# Patient Record
Sex: MALE | Race: WHITE | Employment: OTHER | URBAN - METROPOLITAN AREA
[De-identification: names, ages, dates, MRNs, and addresses within clinical notes are randomized per-mention and may not be internally consistent; named-entity substitution may affect disease eponyms.]

---

## 2020-01-01 ENCOUNTER — APPOINTMENT (OUTPATIENT)
Dept: ULTRASOUND IMAGING | Age: 85
End: 2020-01-01
Payer: COMMERCIAL

## 2020-01-01 ENCOUNTER — HOSPITAL ENCOUNTER (EMERGENCY)
Age: 85
Discharge: HOME | End: 2020-01-01
Attending: EMERGENCY MEDICINE
Payer: COMMERCIAL

## 2020-01-01 VITALS
DIASTOLIC BLOOD PRESSURE: 59 MMHG | OXYGEN SATURATION: 94 % | HEART RATE: 61 BPM | HEIGHT: 69 IN | TEMPERATURE: 97.9 F | SYSTOLIC BLOOD PRESSURE: 169 MMHG | BODY MASS INDEX: 27.4 KG/M2 | RESPIRATION RATE: 18 BRPM | WEIGHT: 185 LBS

## 2020-01-01 LAB
ALBUMIN SERPL-MCNC: 3.6 GM/DL (ref 3.4–5)
ALP BLD-CCNC: 85 IU/L (ref 40–129)
ALT SERPL-CCNC: 13 U/L (ref 10–40)
ANION GAP SERPL CALCULATED.3IONS-SCNC: 16 MMOL/L (ref 4–16)
AST SERPL-CCNC: 27 IU/L (ref 15–37)
BASOPHILS ABSOLUTE: 0.1 K/CU MM
BASOPHILS RELATIVE PERCENT: 1.1 % (ref 0–1)
BILIRUB SERPL-MCNC: 0.6 MG/DL (ref 0–1)
BUN BLDV-MCNC: 27 MG/DL (ref 6–23)
CALCIUM SERPL-MCNC: 8.9 MG/DL (ref 8.3–10.6)
CHLORIDE BLD-SCNC: 98 MMOL/L (ref 99–110)
CO2: 27 MMOL/L (ref 21–32)
CREAT SERPL-MCNC: 1.7 MG/DL (ref 0.9–1.3)
DIFFERENTIAL TYPE: ABNORMAL
EOSINOPHILS ABSOLUTE: 0.4 K/CU MM
EOSINOPHILS RELATIVE PERCENT: 7.5 % (ref 0–3)
GFR AFRICAN AMERICAN: 46 ML/MIN/1.73M2
GFR NON-AFRICAN AMERICAN: 38 ML/MIN/1.73M2
GLUCOSE BLD-MCNC: 128 MG/DL (ref 70–99)
HCT VFR BLD CALC: 38.6 % (ref 42–52)
HEMOGLOBIN: 11.8 GM/DL (ref 13.5–18)
IMMATURE NEUTROPHIL %: 0.7 % (ref 0–0.43)
LYMPHOCYTES ABSOLUTE: 1.3 K/CU MM
LYMPHOCYTES RELATIVE PERCENT: 24.2 % (ref 24–44)
MCH RBC QN AUTO: 28.8 PG (ref 27–31)
MCHC RBC AUTO-ENTMCNC: 30.6 % (ref 32–36)
MCV RBC AUTO: 94.1 FL (ref 78–100)
MONOCYTES ABSOLUTE: 0.5 K/CU MM
MONOCYTES RELATIVE PERCENT: 9 % (ref 0–4)
PDW BLD-RTO: 15.5 % (ref 11.7–14.9)
PLATELET # BLD: 186 K/CU MM (ref 140–440)
PMV BLD AUTO: 9.6 FL (ref 7.5–11.1)
POTASSIUM SERPL-SCNC: 4.5 MMOL/L (ref 3.5–5.1)
PRO-BNP: 490.5 PG/ML
RBC # BLD: 4.1 M/CU MM (ref 4.6–6.2)
SEGMENTED NEUTROPHILS ABSOLUTE COUNT: 3.1 K/CU MM
SEGMENTED NEUTROPHILS RELATIVE PERCENT: 57.5 % (ref 36–66)
SODIUM BLD-SCNC: 141 MMOL/L (ref 135–145)
TOTAL IMMATURE NEUTOROPHIL: 0.04 K/CU MM
TOTAL PROTEIN: 6.8 GM/DL (ref 6.4–8.2)
WBC # BLD: 5.5 K/CU MM (ref 4–10.5)

## 2020-01-01 PROCEDURE — 83880 ASSAY OF NATRIURETIC PEPTIDE: CPT

## 2020-01-01 PROCEDURE — 80053 COMPREHEN METABOLIC PANEL: CPT

## 2020-01-01 PROCEDURE — 99284 EMERGENCY DEPT VISIT MOD MDM: CPT

## 2020-01-01 PROCEDURE — 93971 EXTREMITY STUDY: CPT

## 2020-01-01 PROCEDURE — 85025 COMPLETE CBC W/AUTO DIFF WBC: CPT

## 2020-01-01 RX ORDER — CLINDAMYCIN HYDROCHLORIDE 300 MG/1
300 CAPSULE ORAL 2 TIMES DAILY
Qty: 14 CAPSULE | Refills: 0 | Status: SHIPPED | OUTPATIENT
Start: 2020-01-01 | End: 2020-01-08

## 2020-01-01 RX ORDER — NITROGLYCERIN 0.4 MG/1
0.4 TABLET SUBLINGUAL EVERY 5 MIN PRN
COMMUNITY

## 2020-01-01 RX ORDER — EZETIMIBE 10 MG/1
1 TABLET ORAL
COMMUNITY

## 2020-01-01 RX ORDER — MAGNESIUM 30 MG
250 TABLET ORAL DAILY
COMMUNITY
End: 2020-05-14

## 2020-01-01 RX ORDER — FUROSEMIDE 40 MG/1
1 TABLET ORAL
COMMUNITY

## 2020-01-01 RX ORDER — TAMSULOSIN HYDROCHLORIDE 0.4 MG/1
0.4 CAPSULE ORAL DAILY
COMMUNITY
End: 2022-02-21

## 2020-01-01 SDOH — HEALTH STABILITY: MENTAL HEALTH: HOW OFTEN DO YOU HAVE A DRINK CONTAINING ALCOHOL?: NEVER

## 2020-01-01 ASSESSMENT — PAIN DESCRIPTION - ORIENTATION: ORIENTATION: RIGHT;LEFT

## 2020-01-01 ASSESSMENT — PAIN DESCRIPTION - FREQUENCY: FREQUENCY: CONTINUOUS

## 2020-01-01 ASSESSMENT — PAIN DESCRIPTION - DESCRIPTORS: DESCRIPTORS: ACHING

## 2020-01-01 ASSESSMENT — PAIN DESCRIPTION - LOCATION: LOCATION: LEG

## 2020-01-01 ASSESSMENT — PAIN SCALES - GENERAL: PAINLEVEL_OUTOF10: 3

## 2020-01-01 ASSESSMENT — PAIN DESCRIPTION - PAIN TYPE: TYPE: ACUTE PAIN

## 2020-01-01 NOTE — ED PROVIDER NOTES
distress. HEAD: Normocephalic. Atraumatic. EYES: EOM's grossly intact. Sclera anicteric. ENT: Mucous membranes are moist. Tolerates saliva. No trismus. NECK: Supple. No meningismus. Trachea midline. HEART: RRR. Radial pulses 2+. LUNGS: Respirations unlabored. CTAB  ABDOMEN: Soft. Non-tender. No guarding or rebound. EXTREMITIES: right leg swollen, mildly red and warm. No calf tenderness. Pulses +, sensation intact. SKIN: Warm and dry. NEUROLOGICAL: No gross facial drooping. Moves all 4 extremities spontaneously. PSYCHIATRIC: Normal mood.     I have reviewed and interpreted all of the currently available lab results from this visit (if applicable):  Results for orders placed or performed during the hospital encounter of 01/01/20   CBC with Auto Diff   Result Value Ref Range    WBC 5.5 4.0 - 10.5 K/CU MM    RBC 4.10 (L) 4.6 - 6.2 M/CU MM    Hemoglobin 11.8 (L) 13.5 - 18.0 GM/DL    Hematocrit 38.6 (L) 42 - 52 %    MCV 94.1 78 - 100 FL    MCH 28.8 27 - 31 PG    MCHC 30.6 (L) 32.0 - 36.0 %    RDW 15.5 (H) 11.7 - 14.9 %    Platelets 556 445 - 177 K/CU MM    MPV 9.6 7.5 - 11.1 FL    Differential Type AUTOMATED DIFFERENTIAL     Segs Relative 57.5 36 - 66 %    Lymphocytes % 24.2 24 - 44 %    Monocytes % 9.0 (H) 0 - 4 %    Eosinophils % 7.5 (H) 0 - 3 %    Basophils % 1.1 (H) 0 - 1 %    Segs Absolute 3.1 K/CU MM    Lymphocytes Absolute 1.3 K/CU MM    Monocytes Absolute 0.5 K/CU MM    Eosinophils Absolute 0.4 K/CU MM    Basophils Absolute 0.1 K/CU MM    Immature Neutrophil % 0.7 (H) 0 - 0.43 %    Total Immature Neutrophil 0.04 K/CU MM   CMP   Result Value Ref Range    Sodium 141 135 - 145 MMOL/L    Potassium 4.5 3.5 - 5.1 MMOL/L    Chloride 98 (L) 99 - 110 mMol/L    CO2 27 21 - 32 MMOL/L    BUN 27 (H) 6 - 23 MG/DL    CREATININE 1.7 (H) 0.9 - 1.3 MG/DL    Glucose 128 (H) 70 - 99 MG/DL    Calcium 8.9 8.3 - 10.6 MG/DL    Alb 3.6 3.4 - 5.0 GM/DL    Total Protein 6.8 6.4 - 8.2 GM/DL    Total Bilirubin 0.6 0.0 - 1.0 MG/DL ALT 13 10 - 40 U/L    AST 27 15 - 37 IU/L    Alkaline Phosphatase 85 40 - 129 IU/L    GFR Non- 38 (L) >60 mL/min/1.73m2    GFR  46 (L) >60 mL/min/1.73m2    Anion Gap 16 4 - 16   Brain Natriuretic Peptide   Result Value Ref Range    Pro-.5 (H) <300 PG/ML        Radiographs:  [] Radiologist's Wet Read Report Reviewed:      VL DUP LOWER EXTREMITY VENOUS RIGHT (Final result)   Result time 01/01/20 11:52:09   Final result by Henrry Samayoa MD (01/01/20 11:52:09)                Impression:    No evidence of DVT in the right lower extremity. Subcutaneous edema noted to the right calf.  No focal fluid collection noted. Narrative:    EXAMINATION:  DUPLEX VENOUS ULTRASOUND OF THE RIGHT LOWER EXTREMITY, 1/1/2020 11:23 am    TECHNIQUE:  Duplex ultrasound and Doppler images were obtained of the right lower  extremity. COMPARISON:  None. HISTORY:  ORDERING SYSTEM PROVIDED HISTORY: swelling, pain  TECHNOLOGIST PROVIDED HISTORY:  Reason for exam:->swelling, pain  Reason for Exam: swelling and redness to lower leg  Acuity: Acute  Type of Exam: Initial    FINDINGS:  The visualized veins of the right lower extremity are patent and free of  echogenic thrombus. The veins are normally compressible and have normal  phasic flow. Subcutaneous edema noted to the right calf.  No focal fluid collection noted. [] Discussed with Radiologist:     [] The following radiograph was interpreted by myself in the absence of a radiologist:     EKG: (All EKG's are interpreted by myself in the absence of a cardiologist)      MDM:  Patient's vital signs are stable. Will check CBC, CMP, BNP and get a Doppler of his right lower extremity. Patient CBC shows a normal white count of 5.5. Hemoglobin is 11.8. No left shift. Electrolytes reveal a glucose of 128, BUN of 27, creatinine of 1.7. Normal anion gap of 16. BNP normal. US negative for DVT. No fluid collections noted.

## 2020-01-20 ENCOUNTER — OFFICE VISIT (OUTPATIENT)
Dept: CARDIOLOGY CLINIC | Age: 85
End: 2020-01-20
Payer: MEDICARE

## 2020-01-20 VITALS
DIASTOLIC BLOOD PRESSURE: 60 MMHG | BODY MASS INDEX: 27.78 KG/M2 | WEIGHT: 187.6 LBS | HEIGHT: 69 IN | SYSTOLIC BLOOD PRESSURE: 126 MMHG | HEART RATE: 53 BPM

## 2020-01-20 PROCEDURE — G8427 DOCREV CUR MEDS BY ELIG CLIN: HCPCS | Performed by: INTERNAL MEDICINE

## 2020-01-20 PROCEDURE — 93000 ELECTROCARDIOGRAM COMPLETE: CPT | Performed by: INTERNAL MEDICINE

## 2020-01-20 PROCEDURE — G8417 CALC BMI ABV UP PARAM F/U: HCPCS | Performed by: INTERNAL MEDICINE

## 2020-01-20 PROCEDURE — G8484 FLU IMMUNIZE NO ADMIN: HCPCS | Performed by: INTERNAL MEDICINE

## 2020-01-20 PROCEDURE — 99204 OFFICE O/P NEW MOD 45 MIN: CPT | Performed by: INTERNAL MEDICINE

## 2020-01-20 RX ORDER — NITROGLYCERIN 0.4 MG/1
0.4 TABLET SUBLINGUAL EVERY 5 MIN PRN
COMMUNITY
End: 2020-05-14

## 2020-01-20 RX ORDER — DOCUSATE SODIUM 100 MG/1
100 CAPSULE, LIQUID FILLED ORAL 2 TIMES DAILY PRN
COMMUNITY

## 2020-01-20 RX ORDER — EZETIMIBE 10 MG/1
10 TABLET ORAL DAILY
COMMUNITY
End: 2020-05-14

## 2020-01-20 RX ORDER — LOVASTATIN 20 MG/1
20 TABLET ORAL NIGHTLY
COMMUNITY
End: 2020-01-20

## 2020-01-20 RX ORDER — PHENOL 1.4 %
1 AEROSOL, SPRAY (ML) MUCOUS MEMBRANE DAILY
COMMUNITY
End: 2020-01-20

## 2020-01-20 RX ORDER — TAMSULOSIN HYDROCHLORIDE 0.4 MG/1
0.4 CAPSULE ORAL DAILY
COMMUNITY
End: 2020-05-14

## 2020-01-20 RX ORDER — POTASSIUM CHLORIDE 750 MG/1
10 CAPSULE, EXTENDED RELEASE ORAL 2 TIMES DAILY
COMMUNITY
End: 2020-01-20

## 2020-01-20 RX ORDER — OMEGA-3 FATTY ACIDS CAP DELAYED RELEASE 1000 MG 1000 MG
3000 CAPSULE DELAYED RELEASE ORAL
COMMUNITY

## 2020-01-20 RX ORDER — LORAZEPAM 0.5 MG/1
0.5 TABLET ORAL EVERY 6 HOURS PRN
COMMUNITY
End: 2020-01-20

## 2020-01-20 RX ORDER — FUROSEMIDE 40 MG/1
40 TABLET ORAL 2 TIMES DAILY
COMMUNITY
End: 2020-01-20

## 2020-01-20 RX ORDER — METOPROLOL SUCCINATE 25 MG/1
25 TABLET, EXTENDED RELEASE ORAL DAILY
COMMUNITY
End: 2020-05-14

## 2020-01-20 RX ORDER — WARFARIN SODIUM 3 MG/1
TABLET ORAL DAILY
COMMUNITY
End: 2020-01-20

## 2020-01-20 RX ORDER — DICYCLOMINE HYDROCHLORIDE 10 MG/1
10 CAPSULE ORAL
COMMUNITY
End: 2020-01-20

## 2020-01-20 RX ORDER — FUROSEMIDE 40 MG/1
40 TABLET ORAL 2 TIMES DAILY
COMMUNITY
End: 2020-05-14

## 2020-01-20 RX ORDER — LOSARTAN POTASSIUM 50 MG/1
50 TABLET ORAL DAILY
COMMUNITY
End: 2020-01-20

## 2020-01-20 SDOH — HEALTH STABILITY: MENTAL HEALTH: HOW OFTEN DO YOU HAVE A DRINK CONTAINING ALCOHOL?: NEVER

## 2020-01-20 NOTE — PROGRESS NOTES
every 5 minutes as needed for Chest pain up to max of 3 total doses. If no relief after 1 dose, call 911.  tamsulosin (FLOMAX) 0.4 MG capsule Take 0.4 mg by mouth daily      VITAMIN D PO Take by mouth       No current facility-administered medications for this visit. Review of Systems:   · Constitutional: No Fever or Weight Loss   · Eyes: No Decreased Vision  · ENT: No Headaches, Hearing Loss or Vertigo  · Cardiovascular: No chest pain, dyspnea on exertion, palpitations or loss of consciousness  · Respiratory: No cough or wheezing    · Gastrointestinal: No abdominal pain, appetite loss, blood in stools, constipation, diarrhea or heartburn  · Genitourinary: No dysuria, trouble voiding, or hematuria  · Musculoskeletal:  No gait disturbance, weakness or joint complaints  · Integumentary: No rash or pruritis  · Neurological: No TIA or stroke symptoms  · Psychiatric: No anxiety or depression  · Endocrine: No malaise, fatigue or temperature intolerance  · Hematologic/Lymphatic: No bleeding problems, blood clots or swollen lymph nodes  · Allergic/Immunologic: No nasal congestion or hives  All systems negative except as marked. ·   ·      Physical Examination:    Vitals:    01/20/20 1015   BP: 126/60   Pulse: 53    rr 14  Afebrile    Wt Readings from Last 3 Encounters:   01/20/20 187 lb 9.6 oz (85.1 kg)     Body mass index is 27.7 kg/m². General Appearance:  No distress, conversant    Constitutional:  Well developed, Well nourished, No acute distress, Non-toxic appearance. HENT:  Normocephalic, Atraumatic, Bilateral external ears normal, Oropharynx moist, No oral exudates, Nose normal. Neck- Normal range of motion, No tenderness, Supple, No stridor,no apical-carotid delay, + carotid bruit  Eyes:  PERRL, EOMI, Conjunctiva normal, No discharge. Respiratory:  Normal breath sounds, No respiratory distress, No wheezing, No chest tenderness. ,no use of accessory muscles, diaphragm movement is normal  Cardiovascular: (PMI) apex non displaced,no lifts no thrills, no s3,no s4, Normal heart rate, Normal rhythm, + murmurs, No rubs, No gallops. Carotid arteries pulse and amplitude are normal no bruit, no abdominal bruit noted ( normal abdominal aorta ausculation)  GI:  Bowel sounds normal, Soft, No tenderness, No masses, No pulsatile masses, no hepatosplenomegally, no bruits  : External genitalia appear normal, No masses or lesions. No discharge. No CVA tenderness. Musculoskeletal:  Trace to mild edema, No tenderness, No cyanosis, No clubbing. Good range of motion in all major joints. No tenderness to palpation or major deformities noted. Back- No tenderness. Integument:  Warm, Dry, No erythema, No rash. Skin: no rash, no ulcers  Lymphatic:  No lymphadenopathy noted. Neurologic:  Alert & oriented x 3, Normal motor function, Normal sensory function, No focal deficits noted. Psychiatric:  Affect normal, Judgment normal, Mood normal.   Lab Review   No results for input(s): WBC, HGB, HCT, PLT in the last 72 hours. No results for input(s): NA, K, CL, CO2, PHOS, BUN, CREATININE in the last 72 hours. Invalid input(s): CA  No results for input(s): AST, ALT, ALB, BILIDIR, BILITOT, ALKPHOS in the last 72 hours. No results for input(s): TROPONINI in the last 72 hours. No results found for: BNP  No results found for: INR, PROTIME      EKG:paced    Chest Xray:pending    ECHO:pending  Labs, echo, meds reviewed  Assessment: 80 y. o.year old with PMH of  has no past medical history on file. Recommendations:    1. CAD and shortness of breath: he is 80 yrs old, can walk 75 yards with mild shortness of breath and chest pain when its extremely cold weather, he is DNR, WILL only get echo,continue aspirin and zetia, discussed in detail about DNR and conservative therapy. 2. Mumur: echo ordered  3. Left carotid bruit with history of carotid endarterectomy: carotid doppler ordered  4.  Intermittent claudications: conservative therapy recommended  5. Leg swelling: agree with lasix PRN  6. Dyslipidemia: continue xetia  7. Spinal stenosis: on pain pump  8. Pacer: interrogate today  9. Health maintenance: exerise and diet  All labs, medications and tests reviewed, continue all other medications of all above medical condition listed as is.          Ramón Hay MD, 1/20/2020 10:27 AM

## 2020-01-21 ENCOUNTER — TELEPHONE (OUTPATIENT)
Dept: CARDIOLOGY CLINIC | Age: 85
End: 2020-01-21

## 2020-01-23 ENCOUNTER — PROCEDURE VISIT (OUTPATIENT)
Dept: CARDIOLOGY CLINIC | Age: 85
End: 2020-01-23

## 2020-01-23 VITALS — SYSTOLIC BLOOD PRESSURE: 148 MMHG | DIASTOLIC BLOOD PRESSURE: 78 MMHG | HEART RATE: 50 BPM

## 2020-01-23 NOTE — PROCEDURES
Nuria Model  80 y.o., male  3/8/1927    No primary care provider on file. Chief Complaint   Patient presents with    Procedure     Pacemaker check     Vitals:    01/23/20 1049   BP: (!) 148/78   Pulse: 50     Pacer analysis is reviewed and filed in the pacer chart. Analysis is consistent with normal dual-chamber Bretta Binder pacer function with stable leads and appropriate battery status for the age of the device. Remaining battery is 3 years. Pacer is  48% pacing in the atrium and 94% pacing in the ventricle. Recommend continued every three month pacer check and follow up office visit as scheduled.     Gama Rajput MD, 1/23/2020 11:51 AM

## 2020-01-30 ENCOUNTER — PROCEDURE VISIT (OUTPATIENT)
Dept: CARDIOLOGY CLINIC | Age: 85
End: 2020-01-30
Payer: MEDICARE

## 2020-01-30 PROCEDURE — 93306 TTE W/DOPPLER COMPLETE: CPT | Performed by: INTERNAL MEDICINE

## 2020-01-30 PROCEDURE — 93880 EXTRACRANIAL BILAT STUDY: CPT | Performed by: INTERNAL MEDICINE

## 2020-02-05 ENCOUNTER — TELEPHONE (OUTPATIENT)
Dept: CARDIOLOGY CLINIC | Age: 85
End: 2020-02-05

## 2020-02-06 ENCOUNTER — TELEPHONE (OUTPATIENT)
Dept: CARDIOLOGY CLINIC | Age: 85
End: 2020-02-06

## 2020-03-05 ENCOUNTER — TELEPHONE (OUTPATIENT)
Dept: CARDIOLOGY CLINIC | Age: 85
End: 2020-03-05

## 2020-03-05 NOTE — TELEPHONE ENCOUNTER
Patients latitude monitor is not communicating with his device so I called patient to check monitor and send a transmission to see if we can get monitor going again.

## 2020-05-14 PROBLEM — N18.30 CHRONIC KIDNEY DISEASE, STAGE III (MODERATE) (HCC): Status: ACTIVE | Noted: 2020-05-14

## 2020-05-14 PROBLEM — R60.1 GENERALIZED EDEMA: Status: ACTIVE | Noted: 2020-05-14

## 2020-05-14 PROBLEM — M54.50 CHRONIC BILATERAL LOW BACK PAIN WITHOUT SCIATICA: Status: ACTIVE | Noted: 2020-05-14

## 2020-05-14 PROBLEM — Z95.0 PACEMAKER: Status: ACTIVE | Noted: 2020-05-14

## 2020-05-14 PROBLEM — G89.29 CHRONIC BILATERAL LOW BACK PAIN WITHOUT SCIATICA: Status: ACTIVE | Noted: 2020-05-14

## 2020-05-28 ENCOUNTER — OFFICE VISIT (OUTPATIENT)
Dept: CARDIOLOGY CLINIC | Age: 85
End: 2020-05-28
Payer: MEDICARE

## 2020-05-28 VITALS
WEIGHT: 188 LBS | SYSTOLIC BLOOD PRESSURE: 128 MMHG | HEART RATE: 76 BPM | BODY MASS INDEX: 27.85 KG/M2 | DIASTOLIC BLOOD PRESSURE: 84 MMHG | HEIGHT: 69 IN

## 2020-05-28 PROCEDURE — 93280 PM DEVICE PROGR EVAL DUAL: CPT | Performed by: INTERNAL MEDICINE

## 2020-05-28 PROCEDURE — 99214 OFFICE O/P EST MOD 30 MIN: CPT | Performed by: INTERNAL MEDICINE

## 2020-05-28 NOTE — PROGRESS NOTES
Cooper Bobby MD        OFFICE  FOLLOWUP NOTE    Chief complaints: patient is here for management of CAD,PAD, PPM, DYSLPIDEMIA    Subjective: OCCASIONAL chest pain, no shortness of breath, no dizziness, no palpitations    HPI Mike Crowder is a 80 y. o.year old who  has a past medical history of Arthritis, CAD (coronary artery disease), H/O Doppler carotid ultrasound, H/O echocardiogram, Hyperlipidemia, and Spinal stenosis. and presents for management of CAD,PAD, PPM, DYSLPIDEMIA which are well controlled    HE GETS occasional chest pain which is like ache. SUBSTERNAL Not pressure. Left sided. Non radiated, 2/10. Intermittent, gets better with NTG  Current Outpatient Medications   Medication Sig Dispense Refill    acetaminophen (TYLENOL) 325 MG tablet Take 650 mg by mouth as needed for Pain      NONFORMULARY Morphine pain pump      docusate sodium (COLACE) 100 MG capsule Take 100 mg by mouth 2 times daily      Omega-3 Fatty Acids (FISH OIL) 1000 MG CPDR Take 3,000 mg by mouth      Simethicone 40 MG/0.6ML LIQD Take by mouth as needed       Multiple Vitamins-Minerals (MULTIVITAMIN ADULT PO) Take by mouth      ezetimibe (ZETIA) 10 MG tablet Take 1 tablet by mouth      furosemide (LASIX) 40 MG tablet Take 1 tablet by mouth      metoprolol tartrate (LOPRESSOR) 25 MG tablet Take 25 mg by mouth Half a tab daily.  nitroGLYCERIN (NITROSTAT) 0.4 MG SL tablet Place 0.4 mg under the tongue every 5 minutes as needed for Chest pain up to max of 3 total doses. If no relief after 1 dose, call 911.  tamsulosin (FLOMAX) 0.4 MG capsule Take 0.4 mg by mouth daily      aspirin 81 MG tablet Take 81 mg by mouth daily       No current facility-administered medications for this visit. Allergies: Adhesive tape;  Adhesive tape; Diatrizoate; Penicillins; Statins; and Sulfa antibiotics  Past Medical History:   Diagnosis Date    Arthritis     CAD (coronary artery disease)     H/O Doppler carotid pupils are reactive to light and accomodation, cornea intact, conjunctive normal color, ears are normal in exam,throat exam in normal, teeth, gum and palate are normal, oral mucosa is normal without any notation of pallor or cyanosis  Neck - Supple. No jugular venous distention noted. No carotid bruits, no apical -carotid delay  Respiratory:  Normal breath sounds, No respiratory distress, No wheezing, No chest tenderness. ,no use of accessory muscles, diaphragm movement is normal  Cardiovascular: (PMI) apex non displaced,no lifts no thrills, no s3,no s4, Normal heart rate, Normal rhythm, No murmurs, No rubs, No gallops. Carotid arteries pulse and amplitude are normal no bruit, no abdominal bruit noted ( normal abdominal aorta ausculation), femoral arteries pulse and amplitude are normal no bruit, pedal pulses are normal  Femoral pulses have normal amplitude, no bruits   Extremities - No cyanosis, clubbing, or significant edema, no varicose veins    Abdomen - No masses, tenderness, or organomegaly, no hepato-splenomegally, no bruits  Musculoskeletal - No significant edema, no kyphosis or scoliosis, no deformity in any extremity noted, muscle strength and tone are normal  Skin: no ulcer,no scar,no stasis dermatitis   Neurologic - alert oriented times 3,Cranial nerves II through XII are grossly intact. There were no gross focal neurologic abnormalities.  All sensory and motor nerves examined and were normal  Psychiatric: normal mood and affect    No results found for: CKTOTAL, CKMB, CKMBINDEX, TROPONINI  BNP:  No results found for: BNP  PT/INR:  No results found for: PTINR  No results found for: LABA1C  No results found for: CHOL, TRIG, HDL, LDLCALC, LDLDIRECT  Lab Results   Component Value Date    ALT 13 01/01/2020    AST 27 01/01/2020     TSH:  No results found for: TSH  PACER/ICD  INTERROGATION      LIFE/VOLTAGE: 2.5 YRS   A FIB:No  ATRIAL PACING:YES  VENTRICULAR PACING:YES  SHOCKS:No  ALL lead thresholds, impedence

## 2020-09-16 PROBLEM — I35.0 NONRHEUMATIC AORTIC VALVE STENOSIS: Status: ACTIVE | Noted: 2020-09-16

## 2020-09-16 PROBLEM — E87.6 HYPOKALEMIA: Status: ACTIVE | Noted: 2020-09-16

## 2021-03-26 ENCOUNTER — TELEPHONE (OUTPATIENT)
Dept: CARDIOLOGY CLINIC | Age: 86
End: 2021-03-26

## 2021-04-10 ENCOUNTER — APPOINTMENT (OUTPATIENT)
Dept: CT IMAGING | Age: 86
End: 2021-04-10
Payer: MEDICARE

## 2021-04-10 ENCOUNTER — HOSPITAL ENCOUNTER (EMERGENCY)
Age: 86
Discharge: HOME | End: 2021-04-10
Attending: EMERGENCY MEDICINE
Payer: MEDICARE

## 2021-04-10 VITALS
WEIGHT: 190 LBS | HEIGHT: 69 IN | DIASTOLIC BLOOD PRESSURE: 50 MMHG | RESPIRATION RATE: 14 BRPM | OXYGEN SATURATION: 95 % | SYSTOLIC BLOOD PRESSURE: 119 MMHG | BODY MASS INDEX: 28.14 KG/M2 | TEMPERATURE: 98.1 F | HEART RATE: 78 BPM

## 2021-04-10 DIAGNOSIS — R33.9 URINARY RETENTION: Primary | ICD-10-CM

## 2021-04-10 DIAGNOSIS — R79.89 ELEVATED LFTS: ICD-10-CM

## 2021-04-10 DIAGNOSIS — N17.9 AKI (ACUTE KIDNEY INJURY) (HCC): ICD-10-CM

## 2021-04-10 DIAGNOSIS — R11.2 NON-INTRACTABLE VOMITING WITH NAUSEA, UNSPECIFIED VOMITING TYPE: ICD-10-CM

## 2021-04-10 LAB
ALBUMIN SERPL-MCNC: 3.9 GM/DL (ref 3.4–5)
ALP BLD-CCNC: 134 IU/L (ref 40–129)
ALT SERPL-CCNC: 92 U/L (ref 10–40)
ANION GAP SERPL CALCULATED.3IONS-SCNC: 10 MMOL/L (ref 4–16)
AST SERPL-CCNC: 101 IU/L (ref 15–37)
BACTERIA: NEGATIVE /HPF
BASOPHILS ABSOLUTE: 0 K/CU MM
BASOPHILS RELATIVE PERCENT: 0.3 % (ref 0–1)
BILIRUB SERPL-MCNC: 1 MG/DL (ref 0–1)
BILIRUBIN URINE: NEGATIVE MG/DL
BLOOD, URINE: NEGATIVE
BUN BLDV-MCNC: 26 MG/DL (ref 6–23)
CALCIUM SERPL-MCNC: 8.5 MG/DL (ref 8.3–10.6)
CAST TYPE: ABNORMAL /HPF
CHLORIDE BLD-SCNC: 99 MMOL/L (ref 99–110)
CLARITY: CLEAR
CO2: 23 MMOL/L (ref 21–32)
COLOR: YELLOW
CREAT SERPL-MCNC: 2 MG/DL (ref 0.9–1.3)
CRYSTAL TYPE: NEGATIVE /HPF
DIFFERENTIAL TYPE: ABNORMAL
EOSINOPHILS ABSOLUTE: 0.3 K/CU MM
EOSINOPHILS RELATIVE PERCENT: 4.4 % (ref 0–3)
EPITHELIAL CELLS, UA: 6 /HPF
GFR AFRICAN AMERICAN: 38 ML/MIN/1.73M2
GFR NON-AFRICAN AMERICAN: 31 ML/MIN/1.73M2
GLUCOSE BLD-MCNC: 136 MG/DL (ref 70–99)
GLUCOSE, URINE: NEGATIVE MG/DL
HCT VFR BLD CALC: 35.8 % (ref 42–52)
HEMOGLOBIN: 11.3 GM/DL (ref 13.5–18)
IMMATURE NEUTROPHIL %: 0.8 % (ref 0–0.43)
KETONES, URINE: NEGATIVE MG/DL
LEUKOCYTE ESTERASE, URINE: NEGATIVE
LIPASE: 22 IU/L (ref 13–60)
LYMPHOCYTES ABSOLUTE: 0.3 K/CU MM
LYMPHOCYTES RELATIVE PERCENT: 5.2 % (ref 24–44)
MCH RBC QN AUTO: 29 PG (ref 27–31)
MCHC RBC AUTO-ENTMCNC: 31.6 % (ref 32–36)
MCV RBC AUTO: 92 FL (ref 78–100)
MONOCYTES ABSOLUTE: 0.4 K/CU MM
MONOCYTES RELATIVE PERCENT: 5.9 % (ref 0–4)
NITRITE URINE, QUANTITATIVE: NEGATIVE
PDW BLD-RTO: 15.9 % (ref 11.7–14.9)
PH, URINE: 5 (ref 5–8)
PLATELET # BLD: 119 K/CU MM (ref 140–440)
PMV BLD AUTO: 10 FL (ref 7.5–11.1)
POTASSIUM SERPL-SCNC: 4.4 MMOL/L (ref 3.5–5.1)
PROTEIN UA: 100 MG/DL
RBC # BLD: 3.89 M/CU MM (ref 4.6–6.2)
RBC URINE: 1 /HPF (ref 0–3)
SARS-COV-2, NAAT: NOT DETECTED
SEGMENTED NEUTROPHILS ABSOLUTE COUNT: 4.9 K/CU MM
SEGMENTED NEUTROPHILS RELATIVE PERCENT: 83.4 % (ref 36–66)
SODIUM BLD-SCNC: 132 MMOL/L (ref 135–145)
SOURCE: NORMAL
SPECIFIC GRAVITY UA: 1.02 (ref 1–1.03)
TOTAL IMMATURE NEUTOROPHIL: 0.05 K/CU MM
TOTAL PROTEIN: 6.8 GM/DL (ref 6.4–8.2)
UROBILINOGEN, URINE: 0.2 MG/DL (ref 0.2–1)
WBC # BLD: 5.9 K/CU MM (ref 4–10.5)
WBC UA: 2 /HPF (ref 0–2)

## 2021-04-10 PROCEDURE — 83690 ASSAY OF LIPASE: CPT

## 2021-04-10 PROCEDURE — 74176 CT ABD & PELVIS W/O CONTRAST: CPT

## 2021-04-10 PROCEDURE — 6360000002 HC RX W HCPCS: Performed by: EMERGENCY MEDICINE

## 2021-04-10 PROCEDURE — 85025 COMPLETE CBC W/AUTO DIFF WBC: CPT

## 2021-04-10 PROCEDURE — 80053 COMPREHEN METABOLIC PANEL: CPT

## 2021-04-10 PROCEDURE — 2580000003 HC RX 258: Performed by: EMERGENCY MEDICINE

## 2021-04-10 PROCEDURE — 81001 URINALYSIS AUTO W/SCOPE: CPT

## 2021-04-10 PROCEDURE — 96374 THER/PROPH/DIAG INJ IV PUSH: CPT

## 2021-04-10 PROCEDURE — 87635 SARS-COV-2 COVID-19 AMP PRB: CPT

## 2021-04-10 PROCEDURE — 99285 EMERGENCY DEPT VISIT HI MDM: CPT

## 2021-04-10 PROCEDURE — 51702 INSERT TEMP BLADDER CATH: CPT

## 2021-04-10 PROCEDURE — 6370000000 HC RX 637 (ALT 250 FOR IP): Performed by: EMERGENCY MEDICINE

## 2021-04-10 RX ORDER — ONDANSETRON 4 MG/1
4 TABLET, ORALLY DISINTEGRATING ORAL EVERY 8 HOURS PRN
Qty: 15 TABLET | Refills: 0 | Status: SHIPPED | OUTPATIENT
Start: 2021-04-10 | End: 2021-09-01

## 2021-04-10 RX ORDER — 0.9 % SODIUM CHLORIDE 0.9 %
500 INTRAVENOUS SOLUTION INTRAVENOUS ONCE
Status: COMPLETED | OUTPATIENT
Start: 2021-04-10 | End: 2021-04-10

## 2021-04-10 RX ORDER — ONDANSETRON 2 MG/ML
4 INJECTION INTRAMUSCULAR; INTRAVENOUS EVERY 6 HOURS PRN
Status: DISCONTINUED | OUTPATIENT
Start: 2021-04-10 | End: 2021-04-10 | Stop reason: HOSPADM

## 2021-04-10 RX ORDER — ACETAMINOPHEN 500 MG
1000 TABLET ORAL
Status: COMPLETED | OUTPATIENT
Start: 2021-04-10 | End: 2021-04-10

## 2021-04-10 RX ADMIN — ONDANSETRON 4 MG: 2 INJECTION INTRAMUSCULAR; INTRAVENOUS at 13:54

## 2021-04-10 RX ADMIN — SODIUM CHLORIDE 500 ML: 9 INJECTION, SOLUTION INTRAVENOUS at 13:54

## 2021-04-10 RX ADMIN — ACETAMINOPHEN 1000 MG: 500 TABLET ORAL at 13:54

## 2021-04-10 ASSESSMENT — PAIN SCALES - GENERAL
PAINLEVEL_OUTOF10: 3
PAINLEVEL_OUTOF10: 3

## 2021-04-10 NOTE — ED NOTES
Leg bag attached to howard catheter, tolerated well, secured with leg strap, instructed the patient on emptying the bag and included a urinal in order for ease of drainage, he expressed understanding of this procedure and did not have any questions at this time. Was informed and encouraged to call or return for any further problems or concerns.       Corbin Matos RN  04/10/21 8920

## 2021-04-10 NOTE — ED PROVIDER NOTES
Emergency Department Encounter    Patient: Livier Waller  MRN: [id number]  : 1926  Date of Evaluation: 4/10/2021  ED Provider:  Erinn Xiao    Triage Chief Complaint:   Fever and Emesis    Clinical Impression:  1. Urinary retention    2. Elevated LFTs    3. VIOLETTA (acute kidney injury) ([facility] [address]    4. Non-intractable vomiting with nausea, unspecified vomiting type      Disposition referral (if applicable):  Ravi Graves MD  [address]  [phone]    In 3 days      Terrence Archibald PA-C  [address]  [phone]    Call in 3 days      ED Provider Disposition Time  DISPOSITION Decision To Discharge 04/10/2021 03:29:30 PM       MDM:  Patient presents with generalized body aches, fever, nausea/vomiting as well as urinary symptoms as below. Labs noted for creatinine of 2.0 from recent baseline of 1.7 by record review. Patient also noted to have elevation in ALT, AST and alkaline phosphatase as below. Total bilirubin is normal.  Patient has had prior cholecystectomy. Lipase is not elevated. CT abdomen pelvis was obtained which showed no hepatic emergency. There is incidental finding of multiple scattered areas of low attenuation in the liver likely representing benign cysts or hemangioma. There was evidence of urinary bladder outlet obstruction. Eduardo catheter was placed. Patient has no leukocytosis. Hemoglobin 11.3. Most recent hemoglobin 12. COVID-19 test negative. UA without evidence of urinary tract infection. Patient states that he is feeling much improved with treatment given as below. Patient states that he does not want to come into the hospital and states that he will follow up with his primary care provider on Monday. Patient will be prescribed Zofran. Flomax not prescribed given patient allergy. Patient will increase p.o. intake. Patient will follow up with urology.   Patient discharged with indwelling Eduardo catheter and leg bag. Large bag for nighttime use. Medications ordered in the ED:  ED Medication Orders (From admission, onward)    Start Ordered     Status Ordering Provider    04/10/21 1345 04/10/21 1344  0.9 % sodium chloride bolus  ONCE      Last MAR action: Stopped - by Norman Crowell on 04/10/21 at [facility], [place]    04/10/21 1343 04/10/21 1344  acetaminophen (TYLENOL) tablet 1,000 mg  ONCE PRN      Last MAR action: Given - by Norman Crowell on 04/10/21 at [address] [address], [facility]    04/10/21 1343 04/10/21 1344  ondansetron (ZOFRAN) injection 4 mg  EVERY 6 HOURS PRN      Last MAR action: Given - by Norman Crowell on 04/10/21 at [address], [place]          Disposition medications (if applicable):  New Prescriptions    ONDANSETRON (ZOFRAN ODT) 4 MG DISINTEGRATING TABLET    Take 1 tablet by mouth every 8 hours as needed for Nausea         HPI:  Len Lopez is a 80 y.o. male that presents complaining of 3-day history of nausea, vomiting, fever. Patient reports 3 episodes of emesis today without any evidence of hematemesis. Patient denies any diarrhea, hematochezia or melena. Patient states that he has been constipated. Patient also reports dysuria. Patient initially denied any abdominal pain although patient did endorse tenderness to palpation in the abdomen. No cough, chest pain, shortness of breath. Patient does endorse recent sick contact with a daughter with similar symptoms.     ROS - see HPI, below listed is current ROS at time of my eval:  General:  + fevers  Eyes:  No eye discharge  ENT:  No ear discharge  Cardiovascular:  No palpitations  Respiratory:  No wheezing  Gastrointestinal:  No hematemesis  Musculoskeletal:  No muscle pain  Skin:  No purpura  Neurologic:  No headache  Psychiatric:  No homicidal ideation  Genitourinary:  No hematuria  Endocrine:  No unexpected weight gain    Physical Exam:  Triage VS:    ED Triage Vitals   Enc Vitals Group      BP 04/10/21 1324 (!) 169/61      Pulse 04/10/21 1320 90      Resp 04/10/21 1320 14      Temp 04/10/21 1320 98.9 °F (37.2 °C)      Temp Source 04/10/21 1320 Oral      SpO2 04/10/21 1324 93 %      Weight 04/10/21 1320 190 lb (86.2 kg)      Height 04/10/21 1320 5' 9\" (1.753 m)      Head Circumference --       Peak Flow --       Pain Score --       Pain Loc --       Pain Edu? --       Excl. in [address]? --          General appearance:  No acute distress. Skin:  Warm. Dry. Eye:  Extraocular movements intact. Ears, nose, mouth and throat: Patient wearing mask  Neck:  Trachea midline. Extremity:  No swelling. Normal ROM     Heart:  Regular rate and rhythm, normal S1 & S2, 3/6 systolic murmur. Perfusion:  intact  Respiratory:  Lungs clear to auscultation bilaterally. Respirations nonlabored. Abdominal:  Normal bowel sounds. Soft. Tenderness to palpation in the right upper and lower quadrants. No rebound or guarding. Non distended. Back:  No CVA tenderness to palpation     Neurological:  Alert and oriented times 3. No focal neuro deficits. Psychiatric:  Appropriate    Past Medical History:   Diagnosis Date    Arthritis     CAD (coronary artery disease)     H/O Doppler carotid ultrasound 01/30/2020    Mod -Severe disease Right ICA, Mild disease Left ICA.  H/O echocardiogram 01/30/2020    EF 55-60%, MOD AS, Mild: PHTN, MR, TR & AR. Aortic root 3.8 cm.     Hyperlipidemia     Spinal stenosis      Past Surgical History:   Procedure Laterality Date    BACK SURGERY      CAROTID ENDARTERECTOMY      CATARACT REMOVAL      CHOLECYSTECTOMY      CORONARY ANGIOPLASTY WITH STENT PLACEMENT      PACEMAKER PLACEMENT       Family History   Problem Relation Age of Onset    Cancer Sister     Diabetes Brother      Social History     Socioeconomic History    Marital status:      Spouse name: Not on file    Number of children: Not on file    Years of education: Not on file    Highest education level: Not on file   Occupational History    Not with wall thickening and slightly lobulated contour. Prominent   prostate gland. Underlying urinary tract infection is difficult to exclude. 2.  Asymmetric left renal atrophy may relate to chronic vesicoureteral reflux. 3.  No bowel obstruction. Mild colonic diverticulosis without acute   diverticulitis. Comment: Please note this report has been produced using speech recognition software and may contain errors related to that system including errors in grammar, punctuation, and spelling, as well as words and phrases that may be inappropriate. Efforts were made to edit the dictations.         Jason Milan MD  04/10/21 2548

## 2021-04-10 NOTE — ED TRIAGE NOTES
Pt arrives with complaints of generalized body aches, fever, nausea and vomiting, since yesterday or day before yesterday, he complains of some pain with urination and burning. He reports occasional fever and decreased appetite denies any diarrhea.  States that his daughter has been ill

## 2021-06-08 ENCOUNTER — OFFICE VISIT (OUTPATIENT)
Dept: CARDIOLOGY CLINIC | Age: 86
End: 2021-06-08
Payer: MEDICARE

## 2021-06-08 VITALS
DIASTOLIC BLOOD PRESSURE: 60 MMHG | OXYGEN SATURATION: 95 % | WEIGHT: 185 LBS | SYSTOLIC BLOOD PRESSURE: 120 MMHG | BODY MASS INDEX: 27.4 KG/M2 | HEART RATE: 66 BPM | HEIGHT: 69 IN

## 2021-06-08 DIAGNOSIS — R26.81 UNSTEADY GAIT: Primary | ICD-10-CM

## 2021-06-08 PROCEDURE — 99214 OFFICE O/P EST MOD 30 MIN: CPT | Performed by: INTERNAL MEDICINE

## 2021-06-08 NOTE — PROGRESS NOTES
Juliana Veras MD        OFFICE  FOLLOWUP NOTE    Chief complaints: patient is here for management of CAD,PAD, PPM, DYSLPIDEMIA    Subjective: patient feels better, no chest pain, no shortness of breath, no dizziness, no palpitations    RICHARD Quinn is a 80 y. o.year old who  has a past medical history of Arthritis, CAD (coronary artery disease), H/O Doppler carotid ultrasound, H/O echocardiogram, Hyperlipidemia, and Spinal stenosis. and presents for management of CAD,PAD, PPM, DYSLPIDEMIA which are well controlled    Patient has urilift procedure, had covid vaccine also  Current Outpatient Medications   Medication Sig Dispense Refill    ondansetron (ZOFRAN ODT) 4 MG disintegrating tablet Take 1 tablet by mouth every 8 hours as needed for Nausea 15 tablet 0    oxyCODONE-acetaminophen (PERCOCET) 5-325 MG per tablet Take 1 tablet by mouth 2 times daily.  tiZANidine (ZANAFLEX) 2 MG tablet Take 2 mg by mouth daily       POTASSIUM CHLORIDE PO Take 90 mg by mouth daily      bisacodyl (DULCOLAX) 5 MG EC tablet Take 5 mg by mouth daily as needed for Constipation      acetaminophen (TYLENOL) 325 MG tablet Take 650 mg by mouth as needed for Pain      docusate sodium (COLACE) 100 MG capsule Take 100 mg by mouth 2 times daily as needed       Omega-3 Fatty Acids (FISH OIL) 1000 MG CPDR Take 3,000 mg by mouth      Simethicone 40 MG/0.6ML LIQD Take by mouth as needed       Multiple Vitamins-Minerals (MULTIVITAMIN ADULT PO) Take by mouth      ezetimibe (ZETIA) 10 MG tablet Take 1 tablet by mouth      furosemide (LASIX) 40 MG tablet Take 1 tablet by mouth      metoprolol tartrate (LOPRESSOR) 25 MG tablet Take 25 mg by mouth Half a tab daily.  nitroGLYCERIN (NITROSTAT) 0.4 MG SL tablet Place 0.4 mg under the tongue every 5 minutes as needed for Chest pain up to max of 3 total doses. If no relief after 1 dose, call 911.       tamsulosin (FLOMAX) 0.4 MG capsule Take 0.4 mg by mouth daily      Position: Sitting, Cuff Size: Medium Adult)   Pulse 66   Ht 5' 9\" (1.753 m)   Wt 185 lb (83.9 kg)   SpO2 95%   BMI 27.32 kg/m²   Wt Readings from Last 3 Encounters:   06/08/21 185 lb (83.9 kg)   04/10/21 190 lb (86.2 kg)   01/20/21 188 lb 9.6 oz (85.5 kg)     Body mass index is 27.32 kg/m². GENERAL - Alert, oriented, pleasant, in no apparent distress,normal grooming  HEENT - pupils are intact, cornea intact, conjunctive normal color, ears are normal in exam,  Neck - Supple. No jugular venous distention noted. No carotid bruits, no apical -carotid delay  Respiratory:  Normal breath sounds, No respiratory distress, No wheezing, No chest tenderness. ,no use of accessory muscles, diaphragm movement is normal  Cardiovascular: (PMI) apex non displaced,no lifts no thrills, no s3,no s4, Normal heart rate, Normal rhythm, + murmurs, No rubs, No gallops. Carotid arteries pulse and amplitude are normal no bruit, no abdominal bruit noted ( normal abdominal aorta ausculation),   Extremities - No cyanosis, clubbing, or significant edema, no varicose veins    Abdomen - No masses, tenderness, or organomegaly, no hepato-splenomegally, no bruits  Musculoskeletal + significant edema, no kyphosis or scoliosis, no deformity in any extremity noted, muscle strength and tone are normal  Skin: no ulcer,no scar,no stasis dermatitis   Neurologic - alert oriented times 3,Cranial nerves II through XII are grossly intact. There were no gross focal neurologic abnormalities. Psychiatric: normal mood and affect    No results found for: CKTOTAL, CKMB, CKMBINDEX, TROPONINI  BNP:  No results found for: BNP  PT/INR:  No results found for: PTINR  No results found for: LABA1C  No results found for: CHOL, TRIG, HDL, LDLCALC, LDLDIRECT  Lab Results   Component Value Date    ALT 92 (H) 04/10/2021     (H) 04/10/2021     TSH:  No results found for: TSH    Impression:  Dima Chung is a 80 y. o.year old who  has a past medical history of Arthritis, CAD (coronary artery disease), H/O Doppler carotid ultrasound, H/O echocardiogram, Hyperlipidemia, and Spinal stenosis. and presents with     Plan:  1. CAD and shortness of breath: he has occasional chest pain and takes NTG, he is 89 yrs old,he can  Still walk 75 yards with mild shortness of breath and chest pain when its extremely cold weather, he is DNR,echo showed moderate AS on  echo,continue aspirin and zetia, discussed in detail about DNR and conservative therapy. 2. Mumur: echo showed moderate AS  3. S/p urolift sx  4. Patient requested walker prescription  5. Left carotid bruit with history of carotid endarterectomy: carotid doppler ordered  6. Intermittent claudications: conservative therapy recommended  7. Leg swelling: agree with lasix PRN  8. Dyslipidemia: continue xetia  9. Spinal stenosis: on pain pump  1. Pacer: interrogated today, has 2.5 yrs of batter lifeHealth maintenance: exerise and diet  All labs, medications and tests reviewed, continue all other medications of all above medical condition listed as is.

## 2021-08-27 ENCOUNTER — HOSPITAL ENCOUNTER (EMERGENCY)
Age: 86
Discharge: LEFT BEFORE DISCHARGE | End: 2021-08-27
Attending: EMERGENCY MEDICINE
Payer: MEDICARE

## 2021-08-27 ENCOUNTER — APPOINTMENT (OUTPATIENT)
Dept: GENERAL RADIOLOGY | Age: 86
End: 2021-08-27
Payer: MEDICARE

## 2021-08-27 VITALS
TEMPERATURE: 98.8 F | HEART RATE: 85 BPM | SYSTOLIC BLOOD PRESSURE: 143 MMHG | OXYGEN SATURATION: 94 % | WEIGHT: 185 LBS | BODY MASS INDEX: 27.4 KG/M2 | HEIGHT: 69 IN | DIASTOLIC BLOOD PRESSURE: 58 MMHG | RESPIRATION RATE: 29 BRPM

## 2021-08-27 DIAGNOSIS — R07.9 CHEST PAIN, UNSPECIFIED TYPE: Primary | ICD-10-CM

## 2021-08-27 DIAGNOSIS — R06.00 DYSPNEA, UNSPECIFIED TYPE: ICD-10-CM

## 2021-08-27 LAB
ALBUMIN SERPL-MCNC: 4.1 GM/DL (ref 3.4–5)
ALP BLD-CCNC: 78 IU/L (ref 40–129)
ALT SERPL-CCNC: 14 U/L (ref 10–40)
ANION GAP SERPL CALCULATED.3IONS-SCNC: 11 MMOL/L (ref 4–16)
AST SERPL-CCNC: 28 IU/L (ref 15–37)
BASOPHILS ABSOLUTE: 0 K/CU MM
BASOPHILS RELATIVE PERCENT: 0.3 % (ref 0–1)
BILIRUB SERPL-MCNC: 0.7 MG/DL (ref 0–1)
BUN BLDV-MCNC: 38 MG/DL (ref 6–23)
CALCIUM SERPL-MCNC: 8.7 MG/DL (ref 8.3–10.6)
CHLORIDE BLD-SCNC: 100 MMOL/L (ref 99–110)
CO2: 29 MMOL/L (ref 21–32)
CREAT SERPL-MCNC: 2.1 MG/DL (ref 0.9–1.3)
DIFFERENTIAL TYPE: ABNORMAL
EKG ATRIAL RATE: 79 BPM
EKG DIAGNOSIS: NORMAL
EKG P-R INTERVAL: 240 MS
EKG Q-T INTERVAL: 458 MS
EKG QRS DURATION: 208 MS
EKG QTC CALCULATION (BAZETT): 525 MS
EKG R AXIS: -75 DEGREES
EKG T AXIS: 100 DEGREES
EKG VENTRICULAR RATE: 79 BPM
EOSINOPHILS ABSOLUTE: 0.1 K/CU MM
EOSINOPHILS RELATIVE PERCENT: 2 % (ref 0–3)
GFR AFRICAN AMERICAN: 36 ML/MIN/1.73M2
GFR NON-AFRICAN AMERICAN: 29 ML/MIN/1.73M2
GLUCOSE BLD-MCNC: 117 MG/DL (ref 70–99)
HCT VFR BLD CALC: 40 % (ref 42–52)
HEMOGLOBIN: 12.2 GM/DL (ref 13.5–18)
IMMATURE NEUTROPHIL %: 0.5 % (ref 0–0.43)
LYMPHOCYTES ABSOLUTE: 0.4 K/CU MM
LYMPHOCYTES RELATIVE PERCENT: 5.5 % (ref 24–44)
MCH RBC QN AUTO: 28.7 PG (ref 27–31)
MCHC RBC AUTO-ENTMCNC: 30.5 % (ref 32–36)
MCV RBC AUTO: 94.1 FL (ref 78–100)
MONOCYTES ABSOLUTE: 0.4 K/CU MM
MONOCYTES RELATIVE PERCENT: 5.6 % (ref 0–4)
PDW BLD-RTO: 15.3 % (ref 11.7–14.9)
PLATELET # BLD: 177 K/CU MM (ref 140–440)
PMV BLD AUTO: 9.6 FL (ref 7.5–11.1)
POTASSIUM SERPL-SCNC: 4.4 MMOL/L (ref 3.5–5.1)
PRO-BNP: 763.3 PG/ML
RBC # BLD: 4.25 M/CU MM (ref 4.6–6.2)
SEGMENTED NEUTROPHILS ABSOLUTE COUNT: 5.7 K/CU MM
SEGMENTED NEUTROPHILS RELATIVE PERCENT: 86.1 % (ref 36–66)
SODIUM BLD-SCNC: 140 MMOL/L (ref 135–145)
TOTAL IMMATURE NEUTOROPHIL: 0.03 K/CU MM
TOTAL PROTEIN: 6.9 GM/DL (ref 6.4–8.2)
TROPONIN T: <0.01 NG/ML
WBC # BLD: 6.6 K/CU MM (ref 4–10.5)

## 2021-08-27 PROCEDURE — 83880 ASSAY OF NATRIURETIC PEPTIDE: CPT

## 2021-08-27 PROCEDURE — 93005 ELECTROCARDIOGRAM TRACING: CPT | Performed by: EMERGENCY MEDICINE

## 2021-08-27 PROCEDURE — 99283 EMERGENCY DEPT VISIT LOW MDM: CPT

## 2021-08-27 PROCEDURE — 93010 ELECTROCARDIOGRAM REPORT: CPT | Performed by: INTERNAL MEDICINE

## 2021-08-27 PROCEDURE — 6370000000 HC RX 637 (ALT 250 FOR IP): Performed by: EMERGENCY MEDICINE

## 2021-08-27 PROCEDURE — 85025 COMPLETE CBC W/AUTO DIFF WBC: CPT

## 2021-08-27 PROCEDURE — 84484 ASSAY OF TROPONIN QUANT: CPT

## 2021-08-27 PROCEDURE — 71045 X-RAY EXAM CHEST 1 VIEW: CPT

## 2021-08-27 PROCEDURE — 80053 COMPREHEN METABOLIC PANEL: CPT

## 2021-08-27 RX ORDER — ASPIRIN 81 MG/1
243 TABLET, CHEWABLE ORAL ONCE
Status: COMPLETED | OUTPATIENT
Start: 2021-08-27 | End: 2021-08-27

## 2021-08-27 RX ADMIN — ASPIRIN 243 MG: 81 TABLET, CHEWABLE ORAL at 14:08

## 2021-08-27 NOTE — ED TRIAGE NOTES
The patient presents to the ed with C/O SOB and general malaise and hand tremors. He is A&O x 4 and O2 Sat here is 97% on room air.

## 2021-08-27 NOTE — ED PROVIDER NOTES
Triage Chief Complaint:   Shortness of Breath    [place]:  Irasema Harris is a 80 y.o. male that presents with episodic chest pain and shortness of breath for the past week. Patient reports that he has had several episodes where he gets out of breath with walking primarily. Additionally, patient has had a few episodes of chest pain described as lower chest and occasionally radiating to bilateral shoulders. Patient reports that his last episode he thought \"might be the big one\". Patient took some nitro with improvement of his symptoms. Today patient called his primary care physician he was referred to his cardiologist which she has appointment with later today however he chose to come here for initial assessment which is reasonable. No fevers or chills. No coughing. No abdominal pains. Patient does report history of \"16 heart caths and 16 stents\". Patient also with a pacer. ROS:  General:  No fevers, no chills, no weakness  Eyes:  No recent vison changes, no discharge  ENT:  No sore throat, no nasal congestion, no hearing changes  Cardiovascular:  + chest pain, no palpitations  Respiratory:  + shortness of breath, no cough, no wheezing  Gastrointestinal:  No pain, no nausea, no vomiting, no diarrhea  Musculoskeletal:  No muscle pain, no joint pain  Skin:  No rash, no pruritis, no easy bruising  Neurologic:  No speech problems, no headache, no extremity numbness, no extremity tingling, no extremity weakness  Psychiatric:  No anxiety  Genitourinary:  No dysuria, no hematuria  Endocrine:  No unexpected weight gain, no unexpected weight loss  Extremities:  no edema, no pain    Past Medical History:   Diagnosis Date    Arthritis     CAD (coronary artery disease)     H/O Doppler carotid ultrasound 01/30/2020    Mod -Severe disease Right ICA, Mild disease Left ICA.  H/O echocardiogram 01/30/2020    EF 55-60%, MOD AS, Mild: PHTN, MR, TR & AR. Aortic root 3.8 cm.     Hyperlipidemia     Hypertension     Spinal stenosis      Past Surgical History:   Procedure Laterality Date    BACK SURGERY      CAROTID ENDARTERECTOMY      CATARACT REMOVAL      CHOLECYSTECTOMY      CORONARY ANGIOPLASTY WITH STENT PLACEMENT      PACEMAKER PLACEMENT       Family History   Problem Relation Age of Onset    Cancer Sister     Diabetes Brother      Social History     Socioeconomic History    Marital status:      Spouse name: Not on file    Number of children: Not on file    Years of education: Not on file    Highest education level: Not on file   Occupational History    Not on file   Tobacco Use    Smoking status: Never Smoker    Smokeless tobacco: Never Used   Vaping Use    Vaping Use: Never used   Substance and Sexual Activity    Alcohol use: Never    Drug use: Never    Sexual activity: Not on file   Other Topics Concern    Not on file   Social History Narrative    ** Merged History Encounter **          Social Determinants of Health     Financial Resource Strain:     Difficulty of Paying Living Expenses:    Food Insecurity:     Worried About Running Out of Food in the Last Year:     Ran Out of Food in the Last Year:    Transportation Needs:     Lack of Transportation (Medical):  Lack of Transportation (Non-Medical):    Physical Activity:     Days of Exercise per Week:     Minutes of Exercise per Session:    Stress:     Feeling of Stress :    Social Connections:     Frequency of Communication with Friends and Family:     Frequency of Social Gatherings with Friends and Family:     Attends [employer] Services:     Active Member of Clubs or Organizations:     Attends Club or Organization Meetings:     Marital Status:    Intimate Partner Violence:     Fear of Current or Ex-Partner:     Emotionally Abused:     Physically Abused:     Sexually Abused:      No current facility-administered medications for this encounter.      Current Outpatient Medications   Medication Sig Dispense Refill    oxyCODONE-acetaminophen (PERCOCET) 5-325 MG per tablet Take 1 tablet by mouth 2 times daily.  tiZANidine (ZANAFLEX) 2 MG tablet Take 2 mg by mouth daily       ezetimibe (ZETIA) 10 MG tablet Take 1 tablet by mouth      furosemide (LASIX) 40 MG tablet Take 1 tablet by mouth      metoprolol tartrate (LOPRESSOR) 25 MG tablet Take 25 mg by mouth Half a tab daily.  nitroGLYCERIN (NITROSTAT) 0.4 MG SL tablet Place 0.4 mg under the tongue every 5 minutes as needed for Chest pain up to max of 3 total doses. If no relief after 1 dose, call 911.  aspirin 81 MG tablet Take 81 mg by mouth daily      ondansetron (ZOFRAN ODT) 4 MG disintegrating tablet Take 1 tablet by mouth every 8 hours as needed for Nausea 15 tablet 0    POTASSIUM CHLORIDE PO Take 90 mg by mouth daily      bisacodyl (DULCOLAX) 5 MG EC tablet Take 5 mg by mouth daily as needed for Constipation      acetaminophen (TYLENOL) 325 MG tablet Take 650 mg by mouth as needed for Pain      docusate sodium (COLACE) 100 MG capsule Take 100 mg by mouth 2 times daily as needed       Omega-3 Fatty Acids (FISH OIL) 1000 MG CPDR Take 3,000 mg by mouth      Simethicone 40 MG/0.6ML LIQD Take by mouth as needed       Multiple Vitamins-Minerals (MULTIVITAMIN ADULT PO) Take by mouth      tamsulosin (FLOMAX) 0.4 MG capsule Take 0.4 mg by mouth daily       Allergies   Allergen Reactions    Adhesive Tape     Adhesive Tape     Diatrizoate      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  Penicillins     Statins      Leg cramping.  Sulfa Antibiotics        Nursing Notes Reviewed    Physical Exam:  ED Triage Vitals [08/27/21 1229]   Enc Vitals Group      BP (!) 175/74      Pulse 80      Resp 16      Temp 98.8 °F (37.1 °C)      Temp Source Tympanic      SpO2 96 %      Weight 185 lb (83.9 kg)      Height 5' 9\" (1.753 m)      Head Circumference       Peak Flow       Pain Score       Pain Loc       Pain Edu? Excl. in [address]?         My pulse ox 0.5 (H) 0 - 0.43 %    Total Immature Neutrophil 0.03 K/CU MM   CMP   Result Value Ref Range    Sodium 140 135 - 145 MMOL/L    Potassium 4.4 3.5 - 5.1 MMOL/L    Chloride 100 99 - 110 mMol/L    CO2 29 21 - 32 MMOL/L    BUN 38 (H) 6 - 23 MG/DL    CREATININE 2.1 (H) 0.9 - 1.3 MG/DL    Glucose 117 (H) 70 - 99 MG/DL    Calcium 8.7 8.3 - 10.6 MG/DL    Albumin 4.1 3.4 - 5.0 GM/DL    Total Protein 6.9 6.4 - 8.2 GM/DL    Total Bilirubin 0.7 0.0 - 1.0 MG/DL    ALT 14 10 - 40 U/L    AST 28 15 - 37 IU/L    Alkaline Phosphatase 78 40 - 129 IU/L    GFR Non- 29 (L) >60 mL/min/1.73m2    GFR  36 (L) >60 mL/min/1.73m2    Anion Gap 11 4 - 16   Troponin   Result Value Ref Range    Troponin T <0.010 <0.01 NG/ML   Brain Natriuretic Peptide   Result Value Ref Range    Pro-.3 (H) <300 PG/ML   EKG 12 Lead   Result Value Ref Range    Ventricular Rate 79 BPM    Atrial Rate 79 BPM    P-R Interval 240 ms    QRS Duration 208 ms    Q-T Interval 458 ms    QTc Calculation (Bazett) 525 ms    R Axis -75 degrees    T Axis 100 degrees    Diagnosis       Atrial-sensed ventricular-paced rhythm with prolonged AV conduction with frequent premature ventricular complexes  Abnormal ECG  No previous ECGs available        Radiographs (if obtained):  [] The following radiograph was interpreted by myself in the absence of a radiologist:   [x] Radiologist's Report Reviewed:  XR CHEST PORTABLE   Final Result   No acute abnormality. EKG (if obtained): (All EKG's are interpreted by myself in the absence of a cardiologist)  12 lead EKG per my interpretation:  Paced (AS-) at 79 with PVC  Axis is   Left axis deviation  QTc is  prolonged at 525  There is no specific T wave changes appreciated. There is no specific ST wave changes appreciated. PACED    Prior EKG to compare with was available and paced rhythm seen on prior. Chart review shows recent radiographs:  No results found. MDM:  Pt presents as above. [address]  [phone]  Today  If symptoms worsen OR IF YOU CHANGE YOUR MIND    Disposition medications (if applicable):  Discharge Medication List as of 8/27/2021  2:44 PM          Comment: Please note this report has been produced using speech recognition software and may contain errors related to that system including errors in grammar, punctuation, and spelling, as well as words and phrases that may be inappropriate. If there are any questions or concerns please feel free to contact the dictating provider for clarification.        Eren Lopez MD  08/27/21 9481

## 2021-08-27 NOTE — ED NOTES
The patient was discharged to home in stable condition. All discharge instructions, medications and follow up appointments were reviewed with the patient and or significant other. Any and all concerns if voiced were addressed. The patient was instructed to return for worsening symptoms and any other concerns.       Michael York RN  08/27/21 4890

## 2021-08-30 ENCOUNTER — OFFICE VISIT (OUTPATIENT)
Dept: CARDIOLOGY CLINIC | Age: 86
End: 2021-08-30
Payer: MEDICARE

## 2021-08-30 VITALS
HEART RATE: 56 BPM | OXYGEN SATURATION: 95 % | BODY MASS INDEX: 28.02 KG/M2 | DIASTOLIC BLOOD PRESSURE: 78 MMHG | HEIGHT: 69 IN | SYSTOLIC BLOOD PRESSURE: 148 MMHG | WEIGHT: 189.2 LBS

## 2021-08-30 DIAGNOSIS — E78.5 DYSLIPIDEMIA: ICD-10-CM

## 2021-08-30 DIAGNOSIS — I25.118 CORONARY ARTERY DISEASE OF NATIVE ARTERY OF NATIVE HEART WITH STABLE ANGINA PECTORIS (HCC): ICD-10-CM

## 2021-08-30 DIAGNOSIS — I10 ESSENTIAL HYPERTENSION: ICD-10-CM

## 2021-08-30 DIAGNOSIS — Z95.0 PACEMAKER: ICD-10-CM

## 2021-08-30 DIAGNOSIS — I65.23 BILATERAL CAROTID ARTERY STENOSIS: Primary | ICD-10-CM

## 2021-08-30 DIAGNOSIS — I35.0 NONRHEUMATIC AORTIC VALVE STENOSIS: ICD-10-CM

## 2021-08-30 PROBLEM — I25.10 CORONARY ARTERY DISEASE INVOLVING NATIVE CORONARY ARTERY OF NATIVE HEART WITHOUT ANGINA PECTORIS: Status: ACTIVE | Noted: 2021-08-30

## 2021-08-30 PROCEDURE — 99214 OFFICE O/P EST MOD 30 MIN: CPT | Performed by: NURSE PRACTITIONER

## 2021-08-30 RX ORDER — ISOSORBIDE MONONITRATE 30 MG/1
30 TABLET, EXTENDED RELEASE ORAL DAILY
Qty: 30 TABLET | Refills: 3 | Status: SHIPPED | OUTPATIENT
Start: 2021-08-30 | End: 2022-02-21

## 2021-08-30 RX ORDER — HYDRALAZINE HYDROCHLORIDE 10 MG/1
10 TABLET, FILM COATED ORAL 3 TIMES DAILY
COMMUNITY
End: 2022-02-21

## 2021-08-30 ASSESSMENT — ENCOUNTER SYMPTOMS: SHORTNESS OF BREATH: 1

## 2021-08-30 NOTE — PROGRESS NOTES
Juan Jose Ham    [facility] [address] [facility],Third [address]  Phone: [phone]    Fax [phone]                  Joann Neal MD, Mary Burk MD, Radha Oviedo MD, MD Deanna Lyons MD, Laureen Alexis MD, Madelaine Webber MD, [address] [facility]        Cardiology Progress Note      8/30/2021    RE: Adolph Rubio  (6/20/1926)                             Primary cardiologist: Dr. Deanna Watkins       Subjective:  CC:   1. Bilateral carotid artery stenosis    2. Nonrheumatic aortic valve stenosis    3. Coronary artery disease of native artery of native heart with stable angina pectoris ([facility] [address]    4. Essential hypertension    5. Pacemaker    6. Dyslipidemia        HPI: Adolph Rubio, who is a  80y.o. year old male with a past medical history as listed below. Patient presents to the office for follow up on CAD, HTN, carotid stenosis, aortic valve stenosis, pacemaker, and hyperlipidemia. Was recently seen in emergency department with complaints of chest pain. Patient did not want to be admitted, no ACS seen, x-ray unremarkable. Patient reports pending DNR paperwork. Intervention was previously discussed with patient and Dr. Vicky Ordonez who both agreed to hold off on intervention at this time. Patient is  an active male who walks regularly. Patient is  compliant with medications. Patient denies any chest pain, shortness of breath, dizziness, syncope, or palpitations. Past Medical History:   Diagnosis Date    Arthritis     CAD (coronary artery disease)     H/O Doppler carotid ultrasound 01/30/2020    Mod -Severe disease Right ICA, Mild disease Left ICA.  H/O echocardiogram 01/30/2020    EF 55-60%, MOD AS, Mild: PHTN, MR, TR & AR. Aortic root 3.8 cm.     Hyperlipidemia     Hypertension     Spinal stenosis        Current Outpatient Medications Medication Sig Dispense Refill    hydrALAZINE (APRESOLINE) 10 MG tablet Take 10 mg by mouth 3 times daily      isosorbide mononitrate (IMDUR) 30 MG extended release tablet Take 1 tablet by mouth daily 30 tablet 3    ondansetron (ZOFRAN ODT) 4 MG disintegrating tablet Take 1 tablet by mouth every 8 hours as needed for Nausea 15 tablet 0    oxyCODONE-acetaminophen (PERCOCET) 5-325 MG per tablet Take 1 tablet by mouth 2 times daily.  tiZANidine (ZANAFLEX) 2 MG tablet Take 2 mg by mouth daily       POTASSIUM CHLORIDE PO Take 90 mg by mouth daily      bisacodyl (DULCOLAX) 5 MG EC tablet Take 5 mg by mouth daily as needed for Constipation      docusate sodium (COLACE) 100 MG capsule Take 100 mg by mouth 2 times daily as needed       Omega-3 Fatty Acids (FISH OIL) 1000 MG CPDR Take 3,000 mg by mouth      Simethicone 40 MG/0.6ML LIQD Take by mouth as needed       Multiple Vitamins-Minerals (MULTIVITAMIN ADULT PO) Take by mouth      ezetimibe (ZETIA) 10 MG tablet Take 1 tablet by mouth      furosemide (LASIX) 40 MG tablet Take 1 tablet by mouth      metoprolol tartrate (LOPRESSOR) 25 MG tablet Take 25 mg by mouth Half a tab daily.  nitroGLYCERIN (NITROSTAT) 0.4 MG SL tablet Place 0.4 mg under the tongue every 5 minutes as needed for Chest pain up to max of 3 total doses. If no relief after 1 dose, call 911.  tamsulosin (FLOMAX) 0.4 MG capsule Take 0.4 mg by mouth daily      aspirin 81 MG tablet Take 81 mg by mouth daily      acetaminophen (TYLENOL) 325 MG tablet Take 650 mg by mouth as needed for Pain (Patient not taking: Reported on 8/30/2021)       No current facility-administered medications for this visit. Review of Systems:  Review of Systems   Respiratory: Positive for shortness of breath. Cardiovascular: Positive for chest pain and leg swelling. Negative for palpitations. Musculoskeletal: Negative. Skin: Negative. Neurological: Negative for dizziness and weakness. mitral, and tricuspid regurgitation. Right ventricular systolic pressure of 39 mmHg consistent with mild   pulmonary hypertension. No evidence of pericardial effusion. The ASCVD Risk score (Yovany Coburn, et al., 2013) failed to calculate for the following reasons: The 2013 ASCVD risk score is only valid for ages 36 to 78      Assessment/ Plan:     Dyslipidemia   -At or near goal NA    -No recent labs available- conservative therapy.   -He is to continue current medications (zetia 10 mg) Hepatic function panel WNL. No abdominal pain. No myalgias.     -The nature of cardiac risk has been fully discussed with this patient. I have made him aware of his LDL target goal given his cardiovascular risk analysis. I have discussed the appropriate diet. The need for lifelong compliance in order to reduce risk is stressed. A regular exercise program is recommended to help achieve and maintain normal body weight, fitness and improve lipid balance. A written copy of a low fat, low cholesterol diet has been given to the patient. Nonrheumatic aortic valve stenosis   -Echo in 2020 showed moderate aortic stenosis. Given patient's age conservative management. Reports shortness of breath on ambulation but is at baseline. Continue with Lasix 40 mg daily, Lopressor 25 mg twice daily    Pacemaker   -We will interrogate device, has been over a year since device has been interrogated. Patient is pacer dependent. Patient has 8 months of battery life on current interrogation. Essential hypertension   -Stable, continue Lopressor 25 mg twice daily and Lasix 40 mg daily. Bilateral carotid artery stenosis   -CEA in the past.  Ultrasound in 2020 showed moderate to severe disease of the right proximal I CEA, mild disease of the left proximal ICA.     Coronary artery disease of native artery of native heart with stable angina pectoris [facility])   -Patient reported 6 stents in the past.   Was recently seen in emergency department with complaints of chest pain. Patient did not want to be admitted, no ACS seen, x-ray unremarkable. Patient reports pending DNR paperwork. Intervention was previously discussed with patient and Dr. Teofilo Gary who both agreed to hold off on intervention at this time. Conservative management at this point due to age. Continue with Lasix, Lopressor, Zetia, and aspirin.    -add Imdur 30 mg daily. Patient seen, interviewed and examined. Testing was reviewed. Patient is encouraged to exercise even a brisk walk for 30 minutes at least 3 to 4 times a week. Lifestyle and risk factor modificatons discussed. Various goals are discussed and questions answered. Continue current medications. Appropriate prescriptions are addressed. Questions answered and patient verbalizes understanding. Call for any problems, questions, or concerns. Pt is to follow up in 1 months for Cardiac management    Electronically signed by Britta Carr.  DASH Quiroz CNP on 8/30/2021 at 5:16 PM

## 2021-08-30 NOTE — PATIENT INSTRUCTIONS
**It is YOUR responsibilty to bring medication bottles and/or updated medication list to [address] [place] [address]. This will allow us to better serve you and all your healthcare needs**    Please be informed that if you contact our office outside of normal business hours the physician on call cannot help with any scheduling or rescheduling issues, procedure instruction questions or any type of medication issue. We advise you for any urgent/emergency that you go to the nearest emergency room!     PLEASE CALL OUR OFFICE DURING NORMAL BUSINESS HOURS    Monday - Friday   8 am to 5 pm    [place]: [place]: [phone]    [place]:  [phone]

## 2021-08-30 NOTE — LETTER
Marcie Taylor  6/20/1926  [id number]    Have you had any Chest Pain that is not new? - Yes  If Yes DO EKG - How does it feel - Sharp Pain   How long does the pain last - 5-10 minutes    How long have you been having the pain - Years   Did you take a Nitro   And did it relieve the pain - Yes    Have you had any Shortness of Breath - Yes  If Yes - When at rest   Patient was very shaky on 8/27 before going to hospital.     Have you had any dizziness - No    Have you had any palpitations that are not new? - No  Patient does have an irregular heartbeat. Do you have any edema - swelling in right leg    If Yes - CHECK TO SEE IF THE EDEMA IS PITTING  How long have they been having edema - Years  If Yes - Have they worn compression stockings Yes  If they have worn compression stockings - 1.5 years     Vein \"LEG PROBLEM Questionnaire\"  1. Do you have prominent leg veins? No   2. Do you have any skin discoloration? Yes  3. Do you have any healed or active sores? No  4. Do you have swelling of the legs? Yes  5. Do you have a family history of varicose veins? Patient says his \"dad had them real bad. \" Patient's brother. 6. Does your profession involve pro-longed        standing or heavy lifting? No  7. Have you been fighting overweight problems? No  8. Do you have restless legs? Yes  9. Do you have any night time cramps? Yes  10. Do you have any of the following in your legs:        Aching and Tiredness     Patient has more complaints about leg pain than generalized weakness.      Do you have a surgery or procedure scheduled in the near future - No

## 2021-09-01 PROBLEM — R30.0 DYSURIA: Status: ACTIVE | Noted: 2021-09-01

## 2021-09-02 ENCOUNTER — TELEPHONE (OUTPATIENT)
Dept: CARDIOLOGY CLINIC | Age: 86
End: 2021-09-02

## 2021-09-02 NOTE — TELEPHONE ENCOUNTER
Stop Imdur take 25 mg of benadryl. If rash dose not resolve I by tomorrow or gets worse I will send in a medrol dose pack. Will hold off on starting Ranexa until follow up visit.

## 2021-09-02 NOTE — TELEPHONE ENCOUNTER
B/P 178/76 this am, had patient recheck and 126/80.  Patient started Imdur and has rash, advised to hold and I will contact [employer]

## 2021-12-06 ENCOUNTER — OFFICE VISIT (OUTPATIENT)
Dept: CARDIOLOGY CLINIC | Age: 86
End: 2021-12-06
Payer: MEDICARE

## 2021-12-06 ENCOUNTER — PROCEDURE VISIT (OUTPATIENT)
Dept: CARDIOLOGY CLINIC | Age: 86
End: 2021-12-06
Payer: MEDICARE

## 2021-12-06 VITALS
DIASTOLIC BLOOD PRESSURE: 78 MMHG | SYSTOLIC BLOOD PRESSURE: 128 MMHG | BODY MASS INDEX: 28.35 KG/M2 | HEART RATE: 62 BPM | HEIGHT: 69 IN | WEIGHT: 191.4 LBS

## 2021-12-06 VITALS — DIASTOLIC BLOOD PRESSURE: 62 MMHG | HEART RATE: 66 BPM | SYSTOLIC BLOOD PRESSURE: 144 MMHG

## 2021-12-06 DIAGNOSIS — Z95.0 CARDIAC PACEMAKER IN SITU: Primary | ICD-10-CM

## 2021-12-06 DIAGNOSIS — Z95.0 PACEMAKER: Primary | ICD-10-CM

## 2021-12-06 PROCEDURE — 93280 PM DEVICE PROGR EVAL DUAL: CPT | Performed by: INTERNAL MEDICINE

## 2021-12-06 PROCEDURE — 99214 OFFICE O/P EST MOD 30 MIN: CPT | Performed by: INTERNAL MEDICINE

## 2021-12-06 NOTE — LETTER
Abdiaziz Dumont  6/20/1926  [id number]    Have you had any Chest Pain that is not new? - No      DO EKG IF: Patient has a Heart Rate above 100 or below 40     CAD (Coronary Artery Disease) patient should have one on file every 6 months    Have you had any Shortness of Breath - No    Have you had any dizziness - No    Have you had any palpitations that are not new? - No    Do you have any edema - swelling in No      When did you have your last labs drawn 08/27/2021  Where did you have them done   What doctor ordered [facility]    If we do not have these labs you are retrieve these labs for these providers!     Do you have a surgery or procedure scheduled in the near future - No     Ask patient if they want to sign up for [facility] if they are not already signed up     Check to see if we have an E-MAIL on file for the patient     Check medication list thoroughly!!! AND RECONCILE OUTSIDE MEDICATIONS  If dose has changed change the entire order not just the MG  BE SURE TO ASK PATIENT IF THEY NEED MEDICATION REFILLS     At check out add to every patient's \"wrap up\" the following dot phrase AFTERHOURSEDUCATION and ensure we explain this to our patients

## 2021-12-06 NOTE — PROGRESS NOTES
Lev Silva MD        OFFICE  FOLLOWUP NOTE    Chief complaints: patient is here for management of CAD,PAD, PPM, DYSLPIDEMIA    Subjective: + chest pain,+shortness of breath, no dizziness, no palpitations    HPI Krysten Neves is a 80 y. o.year old who  has a past medical history of Arthritis, CAD (coronary artery disease), H/O Doppler carotid ultrasound, H/O echocardiogram, Hyperlipidemia, Hypertension, and Spinal stenosis. and presents for management of CAD,PAD, PPM, DYSLPIDEMIA which are well controlled    He can walk more than last time, he can walk > 100 yards, however, it does too much, he feels chest pain. Current Outpatient Medications   Medication Sig Dispense Refill    metoprolol succinate (TOPROL XL) 25 MG extended release tablet Take 12.5 mg by mouth daily      rOPINIRole (REQUIP) 0.25 MG tablet Take 0.25 mg by mouth nightly      hydrALAZINE (APRESOLINE) 10 MG tablet Take 10 mg by mouth 3 times daily      isosorbide mononitrate (IMDUR) 30 MG extended release tablet Take 1 tablet by mouth daily 30 tablet 3    oxyCODONE-acetaminophen (PERCOCET) 5-325 MG per tablet Take 1 tablet by mouth 2 times daily.        tiZANidine (ZANAFLEX) 2 MG tablet Take 2 mg by mouth daily       POTASSIUM CHLORIDE PO Take 90 mg by mouth daily       bisacodyl (DULCOLAX) 5 MG EC tablet Take 5 mg by mouth daily as needed for Constipation      acetaminophen (TYLENOL) 325 MG tablet Take 650 mg by mouth as needed for Pain       docusate sodium (COLACE) 100 MG capsule Take 100 mg by mouth 2 times daily as needed       Omega-3 Fatty Acids (FISH OIL) 1000 MG CPDR Take 3,000 mg by mouth      Simethicone 40 MG/0.6ML LIQD Take by mouth as needed       Multiple Vitamins-Minerals (MULTIVITAMIN ADULT PO) Take by mouth      ezetimibe (ZETIA) 10 MG tablet Take 1 tablet by mouth      furosemide (LASIX) 40 MG tablet Take 1 tablet by mouth      nitroGLYCERIN (NITROSTAT) 0.4 MG SL tablet Place 0.4 mg under the tongue every 5 minutes as needed for Chest pain up to max of 3 total doses. If no relief after 1 dose, call 911.  tamsulosin (FLOMAX) 0.4 MG capsule Take 0.4 mg by mouth daily       aspirin 81 MG tablet Take 81 mg by mouth daily       No current facility-administered medications for this visit. Allergies: Adhesive tape, Adhesive tape, Diatrizoate, Penicillins, Statins, and Sulfa antibiotics  Past Medical History:   Diagnosis Date    Arthritis     CAD (coronary artery disease)     H/O Doppler carotid ultrasound 01/30/2020    Mod -Severe disease Right ICA, Mild disease Left ICA.  H/O echocardiogram 01/30/2020    EF 55-60%, MOD AS, Mild: PHTN, MR, TR & AR. Aortic root 3.8 cm.     Hyperlipidemia     Hypertension     Spinal stenosis      Past Surgical History:   Procedure Laterality Date    BACK SURGERY      CAROTID ENDARTERECTOMY      CATARACT REMOVAL      CHOLECYSTECTOMY      CORONARY ANGIOPLASTY WITH STENT PLACEMENT      PACEMAKER PLACEMENT       Family History   Problem Relation Age of Onset    Cancer Sister     Diabetes Brother      Social History     Tobacco Use    Smoking status: Never Smoker    Smokeless tobacco: Never Used   Substance Use Topics    Alcohol use: Never      [unfilled]  Review of Systems:   · Constitutional: No Fever or Weight Loss   · Eyes: No Decreased Vision  · ENT: No Headaches, Hearing Loss or Vertigo  · Cardiovascular: some times chest pain, dyspnea on exertion, palpitations or loss of consciousness  · Respiratory: No cough or wheezing    · Gastrointestinal: No abdominal pain, appetite loss, blood in stools, constipation, diarrhea or heartburn  · Genitourinary: No dysuria, trouble voiding, or hematuria  · Musculoskeletal:  No gait disturbance, weakness or joint complaints  · Integumentary: No rash or pruritis  · Neurological: No TIA or stroke symptoms  · Psychiatric: No anxiety or depression  · Endocrine: No malaise, fatigue or temperature intolerance  · Hematologic/Lymphatic: No bleeding problems, blood clots or swollen lymph nodes  · Allergic/Immunologic: No nasal congestion or hives  All systems negative except as marked. Objective:  /78   Pulse 62   Ht 5' 9\" (1.753 m)   Wt 191 lb 6.4 oz (86.8 kg)   BMI 28.26 kg/m²   Wt Readings from Last 3 Encounters:   12/06/21 191 lb 6.4 oz (86.8 kg)   09/01/21 186 lb 3.2 oz (84.5 kg)   08/30/21 189 lb 3.2 oz (85.8 kg)     Body mass index is 28.26 kg/m². GENERAL - Alert, oriented, pleasant, in no apparent distress,normal grooming  HEENT - pupils are intact, cornea intact, conjunctive normal color, ears are normal in exam,  Neck - Supple. No jugular venous distention noted. No carotid bruits, no apical -carotid delay  Respiratory:  Normal breath sounds, No respiratory distress, No wheezing, No chest tenderness. ,no use of accessory muscles, diaphragm movement is normal  Cardiovascular: (PMI) apex non displaced,no lifts no thrills, no s3,no s4, Normal heart rate, Normal rhythm, No murmurs, No rubs, No gallops. Carotid arteries pulse and amplitude are normal no bruit, no abdominal bruit noted ( normal abdominal aorta ausculation),   Extremities - No cyanosis, clubbing, or significant edema, no varicose veins    Abdomen - No masses, tenderness, or organomegaly, no hepato-splenomegally, no bruits  Musculoskeletal - No significant edema, no kyphosis or scoliosis, no deformity in any extremity noted, muscle strength and tone are normal  Skin: no ulcer,no scar,no stasis dermatitis   Neurologic - alert oriented times 3,Cranial nerves II through XII are grossly intact. There were no gross focal neurologic abnormalities.    Psychiatric: normal mood and affect    No results found for: CKTOTAL, CKMB, CKMBINDEX, TROPONINI  BNP:  No results found for: BNP  PT/INR:  No results found for: PTINR  No results found for: LABA1C  No results found for: CHOL, TRIG, HDL, LDLCALC, LDLDIRECT  Lab Results   Component Value

## 2021-12-09 LAB
ALBUMIN SERPL-MCNC: 4.2 G/DL
ALP BLD-CCNC: 74 U/L
ALT SERPL-CCNC: 13 U/L
ANION GAP SERPL CALCULATED.3IONS-SCNC: NORMAL MMOL/L
AST SERPL-CCNC: 23 U/L
BASOPHILS ABSOLUTE: 0.1 /ΜL
BASOPHILS RELATIVE PERCENT: 1 %
BILIRUB SERPL-MCNC: 0.6 MG/DL (ref 0.1–1.4)
BUN BLDV-MCNC: 16 MG/DL
CALCIUM SERPL-MCNC: 9.3 MG/DL
CHLORIDE BLD-SCNC: 103 MMOL/L
CHOLESTEROL, TOTAL: 181 MG/DL
CHOLESTEROL/HDL RATIO: NORMAL
CO2: 29 MMOL/L
CREAT SERPL-MCNC: 2 MG/DL
EOSINOPHILS ABSOLUTE: 0.2 /ΜL
EOSINOPHILS RELATIVE PERCENT: 3.6 %
GFR CALCULATED: NORMAL
GLUCOSE BLD-MCNC: 96 MG/DL
HCT VFR BLD CALC: 35.6 % (ref 41–53)
HDLC SERPL-MCNC: 54 MG/DL (ref 35–70)
HEMOGLOBIN: 11.7 G/DL (ref 13.5–17.5)
LDL CHOLESTEROL CALCULATED: 91 MG/DL (ref 0–160)
LYMPHOCYTES ABSOLUTE: 2.2 /ΜL
LYMPHOCYTES RELATIVE PERCENT: 32.1 %
MCH RBC QN AUTO: 29.1 PG
MCHC RBC AUTO-ENTMCNC: 32.9 G/DL
MCV RBC AUTO: 88.6 FL
MONOCYTES ABSOLUTE: 0.8 /ΜL
MONOCYTES RELATIVE PERCENT: 11.7 %
NEUTROPHILS ABSOLUTE: 3.4 /ΜL
NEUTROPHILS RELATIVE PERCENT: 51 %
NONHDLC SERPL-MCNC: NORMAL MG/DL
PLATELET # BLD: 187 K/ΜL
PMV BLD AUTO: 11 FL
POTASSIUM SERPL-SCNC: 4.6 MMOL/L
RBC # BLD: 4.02 10^6/ΜL
SODIUM BLD-SCNC: 143 MMOL/L
TOTAL PROTEIN: 6.7
TRIGL SERPL-MCNC: 181 MG/DL
VLDLC SERPL CALC-MCNC: 36 MG/DL
WBC # BLD: 6.7 10^3/ML

## 2022-01-06 ENCOUNTER — PROCEDURE VISIT (OUTPATIENT)
Dept: CARDIOLOGY CLINIC | Age: 87
End: 2022-01-06
Payer: MEDICARE

## 2022-01-06 VITALS — DIASTOLIC BLOOD PRESSURE: 70 MMHG | HEART RATE: 60 BPM | SYSTOLIC BLOOD PRESSURE: 144 MMHG

## 2022-01-06 DIAGNOSIS — Z95.0 CARDIAC PACEMAKER IN SITU: Primary | ICD-10-CM

## 2022-01-06 DIAGNOSIS — Z45.010 PACEMAKER BATTERY DEPLETION: ICD-10-CM

## 2022-01-06 PROCEDURE — 93280 PM DEVICE PROGR EVAL DUAL: CPT | Performed by: INTERNAL MEDICINE

## 2022-01-06 NOTE — PROCEDURES
Marie Redding  80 y.o., male  6/20/1926    Enrike Donis MD    Chief Complaint   Patient presents with   [facility] Procedure     Pacemaker check, battery near EOL     Vitals:    01/06/22 1048   BP: (!) 144/70   Pulse: 60     Pacer analysis is reviewed and filed in the pacer chart. Analysis is consistent with normal [employer] dual-chamber pacer function with stable leads and appropriate battery status for the age of the device. Remaining average battery longevity is 4 months. Pacer is programmed to DDDR mode lower rate of 50 bpm and 22% pacing in the atrium and 92% pacing in the ventricle since last reset on December 6, 2021. Paroxysmal atrial fibrillation burden is 13% since last reset August 30, 2021. [id number] Score for Atrial Fibrillation Stroke Risk is 3. Will discuss with patient's cardiologist Dr. Patricia Alamo for follow-up. Recommend continued every three month pacer check and follow up office visit as scheduled.     Amaya Dyer MD, 1/6/2022 1:47 PM

## 2022-02-21 ENCOUNTER — OFFICE VISIT (OUTPATIENT)
Dept: CARDIOLOGY CLINIC | Age: 87
End: 2022-02-21
Payer: MEDICARE

## 2022-02-21 ENCOUNTER — PROCEDURE VISIT (OUTPATIENT)
Dept: CARDIOLOGY CLINIC | Age: 87
End: 2022-02-21
Payer: MEDICARE

## 2022-02-21 VITALS
HEIGHT: 69 IN | WEIGHT: 188 LBS | DIASTOLIC BLOOD PRESSURE: 68 MMHG | HEART RATE: 64 BPM | SYSTOLIC BLOOD PRESSURE: 132 MMHG | BODY MASS INDEX: 27.85 KG/M2

## 2022-02-21 VITALS — DIASTOLIC BLOOD PRESSURE: 68 MMHG | SYSTOLIC BLOOD PRESSURE: 132 MMHG | HEART RATE: 64 BPM

## 2022-02-21 DIAGNOSIS — R07.9 CHEST PAIN, UNSPECIFIED TYPE: Primary | ICD-10-CM

## 2022-02-21 DIAGNOSIS — Z95.0 CARDIAC PACEMAKER IN SITU: Primary | ICD-10-CM

## 2022-02-21 PROCEDURE — 93000 ELECTROCARDIOGRAM COMPLETE: CPT | Performed by: INTERNAL MEDICINE

## 2022-02-21 PROCEDURE — 93280 PM DEVICE PROGR EVAL DUAL: CPT | Performed by: INTERNAL MEDICINE

## 2022-02-21 PROCEDURE — 99214 OFFICE O/P EST MOD 30 MIN: CPT | Performed by: INTERNAL MEDICINE

## 2022-02-21 NOTE — PROGRESS NOTES
Dione Alatorre MD        OFFICE  FOLLOWUP NOTE    Chief complaints: patient is here for management of CAD,PAD, PPM, DYSLPIDEMIA, PAF    PACER batter life is less than 3 months, has PAF    Subjective: + chest pain, no shortness of breath, no dizziness, no palpitations    HPI Ashish Davis is a 80 y. o.year old who  has a past medical history of Arthritis, CAD (coronary artery disease), H/O Doppler carotid ultrasound, H/O echocardiogram, Hyperlipidemia, Hypertension, and Spinal stenosis. and presents for management of CAD,PAD, PPM, DYSLPIDEMIA which are well controlled    PATIENT WAS MADE TO HAVE BRISK WALK FOR 2-3 MINS AND DID Not have chest pain with brisk walk, he thinks his mask is causing him to have pain when he walks out side,  Current Outpatient Medications   Medication Sig Dispense Refill    methocarbamol (ROBAXIN) 500 MG tablet methocarbamol 500 mg tablet   take 1/2 tab to one at night      metoprolol succinate (TOPROL XL) 25 MG extended release tablet Take 12.5 mg by mouth daily      rOPINIRole (REQUIP) 0.25 MG tablet Take 0.25 mg by mouth nightly      oxyCODONE-acetaminophen (PERCOCET) 5-325 MG per tablet Take 1 tablet by mouth 2 times daily.  bisacodyl (DULCOLAX) 5 MG EC tablet Take 5 mg by mouth daily as needed for Constipation      acetaminophen (TYLENOL) 325 MG tablet Take 650 mg by mouth as needed for Pain       docusate sodium (COLACE) 100 MG capsule Take 100 mg by mouth 2 times daily as needed       Omega-3 Fatty Acids (FISH OIL) 1000 MG CPDR Take 3,000 mg by mouth      Multiple Vitamins-Minerals (MULTIVITAMIN ADULT PO) Take by mouth      ezetimibe (ZETIA) 10 MG tablet Take 1 tablet by mouth      furosemide (LASIX) 40 MG tablet Take 1 tablet by mouth      nitroGLYCERIN (NITROSTAT) 0.4 MG SL tablet Place 0.4 mg under the tongue every 5 minutes as needed for Chest pain up to max of 3 total doses. If no relief after 1 dose, call 911.       aspirin 81 MG tablet Take 81 mg by mouth daily      tiZANidine (ZANAFLEX) 2 MG tablet Take 2 mg by mouth daily  (Patient not taking: Reported on 2/21/2022)       No current facility-administered medications for this visit. Allergies: Adhesive tape, Adhesive tape, Diatrizoate, Penicillins, Statins, and Sulfa antibiotics  Past Medical History:   Diagnosis Date    Arthritis     CAD (coronary artery disease)     H/O Doppler carotid ultrasound 01/30/2020    Mod -Severe disease Right ICA, Mild disease Left ICA.  H/O echocardiogram 01/30/2020    EF 55-60%, MOD AS, Mild: PHTN, MR, TR & AR. Aortic root 3.8 cm.     Hyperlipidemia     Hypertension     Spinal stenosis      Past Surgical History:   Procedure Laterality Date    BACK SURGERY      CAROTID ENDARTERECTOMY      CATARACT REMOVAL      CHOLECYSTECTOMY      CORONARY ANGIOPLASTY WITH STENT PLACEMENT      PACEMAKER PLACEMENT       Family History   Problem Relation Age of Onset    Cancer Sister     Diabetes Brother      Social History     Tobacco Use    Smoking status: Never Smoker    Smokeless tobacco: Never Used   Substance Use Topics    Alcohol use: Never      [unfilled]  Review of Systems:   · Constitutional: No Fever or Weight Loss   · Eyes: No Decreased Vision  · ENT: No Headaches, Hearing Loss or Vertigo  · Cardiovascular: No chest pain, dyspnea on exertion, palpitations or loss of consciousness  · Respiratory: No cough or wheezing    · Gastrointestinal: No abdominal pain, appetite loss, blood in stools, constipation, diarrhea or heartburn  · Genitourinary: No dysuria, trouble voiding, or hematuria  · Musculoskeletal:  No gait disturbance, weakness or joint complaints  · Integumentary: No rash or pruritis  · Neurological: No TIA or stroke symptoms  · Psychiatric: No anxiety or depression  · Endocrine: No malaise, fatigue or temperature intolerance  · Hematologic/Lymphatic: No bleeding problems, blood clots or swollen lymph nodes  · Allergic/Immunologic: No nasal congestion or hives  All systems negative except as marked. Objective:  /68   Pulse 64   Ht 5' 9\" (1.753 m)   Wt 188 lb (85.3 kg)   BMI 27.76 kg/m²   Wt Readings from Last 3 Encounters:   02/21/22 188 lb (85.3 kg)   12/10/21 191 lb 6.4 oz (86.8 kg)   12/06/21 191 lb 6.4 oz (86.8 kg)     Body mass index is 27.76 kg/m². GENERAL - Alert, oriented, pleasant, in no apparent distress,normal grooming  HEENT - pupils are intact, cornea intact, conjunctive normal color, ears are normal in exam,  Neck - Supple. No jugular venous distention noted. No carotid bruits, no apical -carotid delay  Respiratory:  Normal breath sounds, No respiratory distress, No wheezing, No chest tenderness. ,no use of accessory muscles, diaphragm movement is normal  Cardiovascular: (PMI) apex non displaced,no lifts no thrills, no s3,no s4, Normal heart rate, Normal rhythm, + murmurs, No rubs, No gallops. Carotid arteries pulse and amplitude are normal no bruit, no abdominal bruit noted ( normal abdominal aorta ausculation),   Extremities - No cyanosis, clubbing, or significant edema, no varicose veins    Abdomen - No masses, tenderness, or organomegaly, no hepato-splenomegally, no bruits  Musculoskeletal - No significant edema, no kyphosis or scoliosis, no deformity in any extremity noted, muscle strength and tone are normal  Skin: no ulcer,no scar,no stasis dermatitis   Neurologic - alert oriented times 3,Cranial nerves II through XII are grossly intact. There were no gross focal neurologic abnormalities.    Psychiatric: normal mood and affect    No results found for: CKTOTAL, CKMB, CKMBINDEX, TROPONINI  BNP:  No results found for: BNP  PT/INR:  No results found for: PTINR  No results found for: LABA1C  Lab Results   Component Value Date    CHOL 181 12/09/2021    TRIG 181 12/09/2021    HDL 54 12/09/2021    LDLCALC 91 12/09/2021     Lab Results   Component Value Date    ALT 13 12/09/2021    AST 23 12/09/2021     TSH:  No results found for: TSH  PACER/ICD  INTERROGATION      LIFE/VOLTAGE:less than 3 months  A FIB:yes  ATRIAL PACING:YES  VENTRICULAR PACING:YES  SHOCKS:No  ALL lead thresholds, impedence and functions are normal  Recommend to continue routine evaluation      Impression:  Chico Fair is a 80 y. o.year old who  has a past medical history of Arthritis, CAD (coronary artery disease), H/O Doppler carotid ultrasound, H/O echocardiogram, Hyperlipidemia, Hypertension, and Spinal stenosis. and presents with     Plan:  1. CAD and chest pain and shortness of breath:aas above, was made to have brisk walk in the office corridor and did not have any chest pain, last time he had NTG 4 days ago, he usually feel chest pain when he [employer] his car and walks to dinner with very fast pace in cold air,AS PER PATIENT he had 16 times C ( LAST heart cath in 2014 ) , he is 89 yrs old,he can  Still walk 75 yards with mild shortness of breath and chest pain when its extremely cold weather, he is DNR,echo showed moderate AS on  echo,continue aspirin and zetia, discussed in detail about DNR and conservative therapy. continue imdur, hold off on ranexa echo ordered   2. PAF: patient is high risk for fall, CHADs vasc score is 3, he used to be on coumadin ad it was stopped, will continue apsirin. 3. PPM: battery life is less than 3 months, follow up every month, refer to EP  4. Mumur:last  echo showed moderate AS in 2020, will recheck echo  5. S/p urolift sx  6. Patient requested walker prescription  7. Left carotid bruit with history of carotid endarterectomy: carotid doppler ordered  8. Intermittent claudications: conservative therapy recommended  9. Leg swelling: agree with lasix PRN  10. Dyslipidemia: continue xetia  11. Spinal stenosis: on pain pump  All labs, medications and tests reviewed, continue all other medications of all above medical condition listed as is.     [unfilled]

## 2022-02-21 NOTE — PATIENT INSTRUCTIONS
Please be informed that if you contact our office outside of normal business hours the physician on call cannot help with any scheduling or rescheduling issues, procedure instruction questions or any type of medication issue. We advise you for any urgent/emergency that you go to the nearest emergency room! PLEASE CALL OUR OFFICE DURING NORMAL BUSINESS HOURS    Monday - Friday   8 am to 5 pm    [place] [facility]: [phone]    [place]:  [phone]  Please hold on to these instructions the  will call you within 1-9 business days when we receive authorization from your insurance. Echocardiogram    WHAT TO EXPECT:   ? This test will take approximately 45 minutes. ? It is an ultrasound of the heart. ? It can look at the valves and chambers inside the heart   ? There is no special instructions for this test.     If you are unable to keep this appointment, please notify us 24 hours prior to test at [phone].

## 2022-02-25 ENCOUNTER — PROCEDURE VISIT (OUTPATIENT)
Dept: CARDIOLOGY CLINIC | Age: 87
End: 2022-02-25
Payer: MEDICARE

## 2022-02-25 DIAGNOSIS — R07.9 CHEST PAIN, UNSPECIFIED TYPE: ICD-10-CM

## 2022-02-25 LAB
LV EF: 53 %
LVEF MODALITY: NORMAL

## 2022-02-25 PROCEDURE — 93306 TTE W/DOPPLER COMPLETE: CPT | Performed by: INTERNAL MEDICINE

## 2022-03-02 ENCOUNTER — TELEPHONE (OUTPATIENT)
Dept: CARDIOLOGY CLINIC | Age: 87
End: 2022-03-02

## 2022-03-02 NOTE — TELEPHONE ENCOUNTER
Summary   Left ventricular function is low normal, EF is estimated at 50-55%. Moderate left ventricular hypertrophy. Normal diastolic filling pattern for age. No regional wall motion abnormalities were detected. Mildly dilated left atrium. Heavily calcified aortic valve with moderate to severe aortic stenosis; mean   PmmHG. PATRIZIA: 1.0cm2. DVI: 0.3. Mitral annular calcification is present. Mild mitral, tricuspid and moderate aortic regurgitation is present. RVSP is 30 mmHg. Dilated aortic root(3.8cm). No evidence of pericardial effusion. Compared to previous study on 2020, the mean gradient of the aortic valve   has increased from 23mmHg, to 29mmhg. Spoke with patient regarding results of echo. Patient voiced understanding.

## 2022-03-15 ENCOUNTER — OFFICE VISIT (OUTPATIENT)
Dept: CARDIOLOGY CLINIC | Age: 87
End: 2022-03-15
Payer: MEDICARE

## 2022-03-15 VITALS
HEIGHT: 69 IN | SYSTOLIC BLOOD PRESSURE: 102 MMHG | WEIGHT: 189 LBS | HEART RATE: 62 BPM | DIASTOLIC BLOOD PRESSURE: 60 MMHG | BODY MASS INDEX: 27.99 KG/M2

## 2022-03-15 DIAGNOSIS — Z95.0 PACEMAKER: Primary | ICD-10-CM

## 2022-03-15 PROCEDURE — 99214 OFFICE O/P EST MOD 30 MIN: CPT | Performed by: INTERNAL MEDICINE

## 2022-03-15 NOTE — PROGRESS NOTES
Kathrin Gonzalez MD        OFFICE  FOLLOWUP NOTE    Chief complaints: patient is here for management of CAD,PAD, PPM, DYSLPIDEMIA, PAF    Subjective: patient feels better, no chest pain, no shortness of breath, no dizziness, no palpitations    HPI Giovana Chiu is a 80 y. o.year old who  has a past medical history of Arthritis, CAD (coronary artery disease), H/O Doppler carotid ultrasound, H/O echocardiogram, Hx of Doppler echocardiogram, Hyperlipidemia, Hypertension, and Spinal stenosis. and presents for management of CAD,PAD, PPM, DYSLPIDEMIA, PAF which are well controlled      Current Outpatient Medications   Medication Sig Dispense Refill    methocarbamol (ROBAXIN) 500 MG tablet methocarbamol 500 mg tablet   take 1/2 tab to one at night      metoprolol succinate (TOPROL XL) 25 MG extended release tablet Take 12.5 mg by mouth daily      rOPINIRole (REQUIP) 0.25 MG tablet Take 0.25 mg by mouth nightly      oxyCODONE-acetaminophen (PERCOCET) 5-325 MG per tablet Take 1 tablet by mouth 2 times daily.  bisacodyl (DULCOLAX) 5 MG EC tablet Take 5 mg by mouth daily as needed for Constipation      acetaminophen (TYLENOL) 325 MG tablet Take 650 mg by mouth as needed for Pain       docusate sodium (COLACE) 100 MG capsule Take 100 mg by mouth 2 times daily as needed       Omega-3 Fatty Acids (FISH OIL) 1000 MG CPDR Take 3,000 mg by mouth      Multiple Vitamins-Minerals (MULTIVITAMIN ADULT PO) Take by mouth      ezetimibe (ZETIA) 10 MG tablet Take 1 tablet by mouth      furosemide (LASIX) 40 MG tablet Take 1 tablet by mouth      nitroGLYCERIN (NITROSTAT) 0.4 MG SL tablet Place 0.4 mg under the tongue every 5 minutes as needed for Chest pain up to max of 3 total doses. If no relief after 1 dose, call 911.       aspirin 81 MG tablet Take 81 mg by mouth daily      tiZANidine (ZANAFLEX) 2 MG tablet Take 2 mg by mouth daily  (Patient not taking: Reported on 2/21/2022)       No current facility-administered medications for this visit. Allergies: Adhesive tape, Adhesive tape, Diatrizoate, Penicillins, Statins, and Sulfa antibiotics  Past Medical History:   Diagnosis Date    Arthritis     CAD (coronary artery disease)     H/O Doppler carotid ultrasound 01/30/2020    Mod -Severe disease Right ICA, Mild disease Left ICA.  H/O echocardiogram 01/30/2020    EF 55-60%, MOD AS, Mild: PHTN, MR, TR & AR. Aortic root 3.8 cm.  Hx of Doppler echocardiogram 02/25/2022    EF 50-55% Mod LV hypertrophy. Mildly dialted LA. Heavily calcified aortic valve iwht mod to severe AS. Mitral annular calcification. Mild TR and MR. Mod AR. Dilated aortic root.     Hyperlipidemia     Hypertension     Spinal stenosis      Past Surgical History:   Procedure Laterality Date    BACK SURGERY      CAROTID ENDARTERECTOMY      CATARACT REMOVAL      CHOLECYSTECTOMY      CORONARY ANGIOPLASTY WITH STENT PLACEMENT      PACEMAKER PLACEMENT       Family History   Problem Relation Age of Onset    Cancer Sister     Diabetes Brother      Social History     Tobacco Use    Smoking status: Never Smoker    Smokeless tobacco: Never Used   Substance Use Topics    Alcohol use: Never      [unfilled]  Review of Systems:   · Constitutional: No Fever or Weight Loss   · Eyes: No Decreased Vision  · ENT: No Headaches, Hearing Loss or Vertigo  · Cardiovascular: No chest pain, dyspnea on exertion, palpitations or loss of consciousness  · Respiratory: No cough or wheezing    · Gastrointestinal: No abdominal pain, appetite loss, blood in stools, constipation, diarrhea or heartburn  · Genitourinary: No dysuria, trouble voiding, or hematuria  · Musculoskeletal:  No gait disturbance, weakness or joint complaints  · Integumentary: No rash or pruritis  · Neurological: No TIA or stroke symptoms  · Psychiatric: No anxiety or depression  · Endocrine: No malaise, fatigue or temperature intolerance  · Hematologic/Lymphatic: No bleeding problems, blood clots or swollen lymph nodes  · Allergic/Immunologic: No nasal congestion or hives  All systems negative except as marked. Objective:  /60   Pulse 62   Ht 5' 9\" (1.753 m)   Wt 189 lb (85.7 kg)   BMI 27.91 kg/m²   Wt Readings from Last 3 Encounters:   03/15/22 189 lb (85.7 kg)   02/21/22 188 lb (85.3 kg)   12/10/21 191 lb 6.4 oz (86.8 kg)     Body mass index is 27.91 kg/m². GENERAL - Alert, oriented, pleasant, in no apparent distress,normal grooming  HEENT - pupils are intact, cornea intact, conjunctive normal color, ears are normal in exam,  Neck - Supple. No jugular venous distention noted. No carotid bruits, no apical -carotid delay  Respiratory:  Normal breath sounds, No respiratory distress, No wheezing, No chest tenderness. ,no use of accessory muscles, diaphragm movement is normal  Cardiovascular: (PMI) apex non displaced,no lifts no thrills, no s3,no s4, Normal heart rate, Normal rhythm, No murmurs, No rubs, No gallops. Carotid arteries pulse and amplitude are normal no bruit, no abdominal bruit noted ( normal abdominal aorta ausculation),   Extremities - No cyanosis, clubbing, or significant edema, no varicose veins    Abdomen - No masses, tenderness, or organomegaly, no hepato-splenomegally, no bruits  Musculoskeletal - No significant edema, no kyphosis or scoliosis, no deformity in any extremity noted, muscle strength and tone are normal  Skin: no ulcer,no scar,no stasis dermatitis   Neurologic - alert oriented times 3,Cranial nerves II through XII are grossly intact. There were no gross focal neurologic abnormalities.    Psychiatric: normal mood and affect    No results found for: CKTOTAL, CKMB, CKMBINDEX, TROPONINI  BNP:  No results found for: BNP  PT/INR:  No results found for: PTINR  No results found for: LABA1C  Lab Results   Component Value Date    CHOL 181 12/09/2021    TRIG 181 12/09/2021    HDL 54 12/09/2021    LDLCALC 91 12/09/2021     Lab Results   Component Value Date    ALT 13 12/09/2021    AST 23 12/09/2021     TSH:  No results found for: TSH    Impression:  Mable Martin is a 80 y. o.year old who  has a past medical history of Arthritis, CAD (coronary artery disease), H/O Doppler carotid ultrasound, H/O echocardiogram, Hx of Doppler echocardiogram, Hyperlipidemia, Hypertension, and Spinal stenosis. and presents with     Plan:  1. CAD and chest pain and shortness of breath:aas above, was made to have brisk walk in the office corridor and did not have any chest pain, last time he had NTG 4 days ago, he usually feel chest pain when he [employer] his car and walks to dinner with very fast pace in cold air,AS PER PATIENT he had 16 times LHC ( LAST heart cath in 2014 ) , he is 89 yrs old,he can  Still walk 75 yards with mild shortness of breath and chest pain when its extremely cold weather, he is DNR,echo showed moderate AS on  echo,continue aspirin and zetia, discussed in detail about DNR and conservative therapy. continue imdur, hold off on ranexa echo showed normal lvef moderate AS, HE IS OK FOR FISHING  2. PAF: patient is high risk for fall, CHADs vasc score is 3, he used to be on coumadin ad it was stopped, will continue apsirin. 3. PPM: battery life is less than 3 months, follow up every month, referred  to EP, he has appointment on April 9th, will follow up,WILL CHECK BATTERT AGAIN TODAY  4. Mumur:last  echo showed moderate AS in 2020, rechecked echo, his still has moderate AS,, PATRIZIA is 1.0, with mean gradient of 29mmHg, WILL HOLD OFF ON LHR/RHC AND MAX at this time  5. S/p urolift sx  6. Patient requested walker prescription  7. Left carotid bruit with history of carotid endarterectomy: carotid doppler ordered  8. Intermittent claudications: conservative therapy recommended  9. Leg swelling: agree with lasix PRN  10. Dyslipidemia: continue xetia  11.  Spinal stenosis: on pain pump  All labs, medications and tests reviewed, continueAll labs, medications and tests reviewed, continue all other medications of all above medical condition listed as is.     @[facility]@

## 2022-03-22 ENCOUNTER — PROCEDURE VISIT (OUTPATIENT)
Dept: CARDIOLOGY CLINIC | Age: 87
End: 2022-03-22
Payer: MEDICARE

## 2022-03-22 DIAGNOSIS — Z95.0 CARDIAC PACEMAKER IN SITU: Primary | ICD-10-CM

## 2022-03-22 PROCEDURE — 93288 INTERROG EVL PM/LDLS PM IP: CPT | Performed by: INTERNAL MEDICINE

## 2022-03-23 NOTE — PROCEDURES
Marcellus Jon  80 y.o., male  6/20/1926    Abby Walls MD    Chief Complaint   Patient presents with    Procedure     Pacemaker check     Vitals:    03/22/22 1012   BP: 128/60   Temp: (!) 60 °F (15.6 °C)     Pacer analysis is reviewed and filed in the pacer chart. Analysis is consistent with normal dual-chamber [employer] pacer function with stable leads and appropriate battery status for the age of the device. Remaining average battery longevity is less than 3 months. Pacer is programmed to DDDR mode lower rate of 50 bpm and the 96% pacing in the ventricle since last visit on February 21, 2022. PAF noted. Patient is off of Coumadin due to high risk for falls. Patient is scheduled to see Dr. Zachary Almaraz on April 8, 2022 for elective generator replacement.     Antonio Bray MD, 3/23/2022 1:41 PM

## 2022-03-24 VITALS — HEART RATE: 60 BPM | SYSTOLIC BLOOD PRESSURE: 128 MMHG | DIASTOLIC BLOOD PRESSURE: 60 MMHG

## 2022-04-08 ENCOUNTER — INITIAL CONSULT (OUTPATIENT)
Dept: CARDIOLOGY CLINIC | Age: 87
End: 2022-04-08
Payer: MEDICARE

## 2022-04-08 VITALS
DIASTOLIC BLOOD PRESSURE: 68 MMHG | HEART RATE: 63 BPM | SYSTOLIC BLOOD PRESSURE: 134 MMHG | HEIGHT: 69 IN | BODY MASS INDEX: 27.7 KG/M2 | WEIGHT: 187 LBS | OXYGEN SATURATION: 99 %

## 2022-04-08 DIAGNOSIS — Z95.0 CARDIAC PACEMAKER IN SITU: ICD-10-CM

## 2022-04-08 DIAGNOSIS — I35.0 NONRHEUMATIC AORTIC VALVE STENOSIS: ICD-10-CM

## 2022-04-08 DIAGNOSIS — Z45.010 PACEMAKER AT END OF BATTERY LIFE: Primary | ICD-10-CM

## 2022-04-08 PROCEDURE — 99204 OFFICE O/P NEW MOD 45 MIN: CPT | Performed by: INTERNAL MEDICINE

## 2022-04-08 PROCEDURE — 93000 ELECTROCARDIOGRAM COMPLETE: CPT | Performed by: INTERNAL MEDICINE

## 2022-04-08 NOTE — PROGRESS NOTES
Electrophysiology Consult Note      Reason for consultation:  Pacemaker    Chief complaint : Pacemaker at end of battery life    Referring physician: Dr. Rocio Bucio      Primary care physician: Gonzalo Hunter MD      History of Present Illness:     Patient is a 35-year-old female with history of hypertension, hyperlipidemia, CAD, complete heart block s/p pacemaker here for pacemaker at end of battery life. Patient denies any cardiac symptoms at this time. Patient denies chest pain, shortness of breath, tiredness or weakness. Patient denies palpitations. Patient drinks 1 to 2 cups of decaf caffeine he does not smoke does not drink alcohol does not exercise      Pastmedical history:   Past Medical History:   Diagnosis Date    Arthritis     CAD (coronary artery disease)     H/O Doppler carotid ultrasound 01/30/2020    Mod -Severe disease Right ICA, Mild disease Left ICA.  H/O echocardiogram 01/30/2020    EF 55-60%, MOD AS, Mild: PHTN, MR, TR & AR. Aortic root 3.8 cm.  Hx of Doppler echocardiogram 02/25/2022    EF 50-55% Mod LV hypertrophy. Mildly dialted LA. Heavily calcified aortic valve iwht mod to severe AS. Mitral annular calcification. Mild TR and MR. Mod AR. Dilated aortic root.  Hyperlipidemia     Hypertension     Spinal stenosis        Surgical history :   Past Surgical History:   Procedure Laterality Date    BACK SURGERY      CAROTID ENDARTERECTOMY      CATARACT REMOVAL      CHOLECYSTECTOMY      CORONARY ANGIOPLASTY WITH STENT PLACEMENT      PACEMAKER PLACEMENT         Family history:   Family History   Problem Relation Age of Onset    Cancer Sister     Diabetes Brother        Social history :  reports that he has never smoked. He has never used smokeless tobacco. He reports that he does not drink alcohol and does not use drugs.     Allergies   Allergen Reactions    Adhesive Tape     Adhesive Tape     Diatrizoate      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  Penicillins     Statins      Leg cramping.  Sulfa Antibiotics        Current Outpatient Medications on File Prior to Visit   Medication Sig Dispense Refill    methocarbamol (ROBAXIN) 500 MG tablet methocarbamol 500 mg tablet   take 1/2 tab to one at night      metoprolol succinate (TOPROL XL) 25 MG extended release tablet Take 12.5 mg by mouth daily      rOPINIRole (REQUIP) 0.25 MG tablet Take 0.25 mg by mouth nightly      oxyCODONE-acetaminophen (PERCOCET) 5-325 MG per tablet Take 1 tablet by mouth 2 times daily.  tiZANidine (ZANAFLEX) 2 MG tablet Take 2 mg by mouth daily       bisacodyl (DULCOLAX) 5 MG EC tablet Take 5 mg by mouth daily as needed for Constipation      acetaminophen (TYLENOL) 325 MG tablet Take 650 mg by mouth as needed for Pain       docusate sodium (COLACE) 100 MG capsule Take 100 mg by mouth 2 times daily as needed       Omega-3 Fatty Acids (FISH OIL) 1000 MG CPDR Take 3,000 mg by mouth      Multiple Vitamins-Minerals (MULTIVITAMIN ADULT PO) Take by mouth      ezetimibe (ZETIA) 10 MG tablet Take 1 tablet by mouth      furosemide (LASIX) 40 MG tablet Take 1 tablet by mouth      nitroGLYCERIN (NITROSTAT) 0.4 MG SL tablet Place 0.4 mg under the tongue every 5 minutes as needed for Chest pain up to max of 3 total doses. If no relief after 1 dose, call 911.  aspirin 81 MG tablet Take 81 mg by mouth daily       No current facility-administered medications on file prior to visit. Review of Systems:   Review of Systems   Constitutional: Negative for activity change, chills, fatigue and fever. HENT: Negative for congestion, ear pain and tinnitus. Eyes: Negative for photophobia, pain and visual disturbance. Respiratory: Negative for cough, chest tightness, shortness of breath and wheezing. Cardiovascular: Negative for chest pain, palpitations and leg swelling.    Gastrointestinal: Negative for abdominal pain, blood in stool, constipation, diarrhea, nausea and vomiting. Endocrine: Negative for cold intolerance and heat intolerance. Genitourinary: Negative for dysuria, flank pain and hematuria. Musculoskeletal: Negative for arthralgias, back pain, myalgias and neck stiffness. Skin: Negative for color change and rash. Allergic/Immunologic: Negative for food allergies. Neurological: Negative for dizziness, light-headedness, numbness and headaches. Hematological: Does not bruise/bleed easily. Psychiatric/Behavioral: Negative for agitation, behavioral problems and confusion. Examination:      Vitals:    04/08/22 1032   BP: 134/68   Site: Left Upper Arm   Position: Sitting   Cuff Size: Medium Adult   Pulse: 63   SpO2: 99%   Weight: 187 lb (84.8 kg)   Height: 5' 9\" (1.753 m)        Body mass index is 27.62 kg/m². Physical Exam  Constitutional:       Appearance: Normal appearance. He is not ill-appearing. HENT:      Head: Normocephalic and atraumatic. Mouth/Throat:      Mouth: Mucous membranes are moist.   Eyes:      Conjunctiva/sclera: Conjunctivae normal.   Cardiovascular:      Rate and Rhythm: Normal rate and regular rhythm. Heart sounds: No murmur heard. Pulmonary:      Effort: Pulmonary effort is normal.      Breath sounds: No rales. Abdominal:      General: Abdomen is flat. Palpations: Abdomen is soft. Musculoskeletal:         General: No tenderness. Normal range of motion. Cervical back: Normal range of motion. Right lower leg: No edema. Left lower leg: No edema. Skin:     General: Skin is warm and dry. Neurological:      General: No focal deficit present. Mental Status: He is alert and oriented to person, place, and time.                CBC:   Lab Results   Component Value Date    WBC 6.7 12/09/2021    HGB 11.7 12/09/2021    HCT 35.6 12/09/2021     12/09/2021     Lipids:  Lab Results   Component Value Date    CHOL 181 12/09/2021    TRIG 181 12/09/2021 HDL 54 12/09/2021    LDLCALC 91 12/09/2021     PT/INR: No results found for: INR     BMP:    Lab Results   Component Value Date     04/06/2022    K 4.4 04/06/2022    CL 98 04/06/2022    CO2 26 04/06/2022    BUN 33 04/06/2022     CMP:   Lab Results   Component Value Date    AST 23 12/09/2021    PROT 6.9 08/27/2021    BILITOT 0.6 12/09/2021    ALKPHOS 74 12/09/2021     TSH:  No results found for: TSH, T4    EKGINTERPRETATION - EKG Interpretation:        IMPRESSION / RECOMMENDATIONS:     Pacemaker at end of battery life    Complete heart block sp pacemaker  CKD -creatinine at 2.1 baseline is 1.7-2.1  HLD on zetia  HTN on metoprolol and lasix - BP controlled      80year-old patient with a history of complete heart block with RV pacing dependent. Pacemaker battery life is less than 3 months reported. Device Assessment:       The device is [employer] pacemaker - Dual Chamber chamber      MRI Compatible : yes    Device interrogation was performed. Mode:  DDDr     Sensing is normal. Impedence is normal.  Threshold is normal.     There has not been interval changes. Estimated battery life is less than 3 months      The underlying rhythm is as, .  35 % atrial paced; 96 % ventricular paced. Atrial Arrhythmia : 1.2%     Patient has atrial fibrillation paroxysmal    Non sustained VT episodes : No    Sustained VT episodes : No    The patient is pacemaker dependent. LVEF is 50-55%    Patient has PAF episode and patient used to be on Coumadin before but given he is very high fall risk Coumadin was stopped and patient does not want to get back on Coumadin. Patient is on aspirin currently. Patient device is not at [facility] yet but device reports less than 3 months and he is dependent so we will try to schedule for the pacemaker generator change in 2 months.       The risks including, but not limited to, the risks of vascular injury, bleeding, infection, device malfunction, lead dislodgement, radiation exposure, injury to cardiac and surrounding structures (including pneumothorax), stroke, myocardial infarction and death were discussed in detail. The patient opted to proceed with the device implantation. Thanks again for allowing me to participate in care of this patient. Please call me if you have any questions. With best regards. Lynda Birmingham MD, 4/8/2022 11:12 AM     Please note this report has been partially produced using speech recognition software and may contain errors related to that system including errors in grammar, punctuation, and spelling, as well as words and phrases that may be inappropriate. If there are any questions or concerns please feel free to contact the dictating provider for clarification.

## 2022-04-08 NOTE — PATIENT INSTRUCTIONS
Please be informed that if you contact our office outside of normal business hours the physician on call cannot help with any scheduling or rescheduling issues, procedure instruction questions or any type of medication issue. We advise you for any urgent/emergency that you go to the nearest emergency room! PLEASE CALL OUR OFFICE DURING NORMAL BUSINESS HOURS    Monday - Friday   8 am to 5 pm    [place]: [place] [address]: [phone]    [place]:  [phone]    **It is YOUR responsibilty to bring medication bottles and/or updated medication list to [address] [place] [address]. This will allow us to better serve you and all your healthcare needs**    Covid test, procedure paperwork, and pre-testing scheduled on 6/6/2022 at 10:30 am. Procedure scheduled with Dr. Lyla Mendoza on 6/9/2022 at [facility].

## 2022-05-03 ASSESSMENT — ENCOUNTER SYMPTOMS
WHEEZING: 0
ABDOMINAL PAIN: 0
EYE PAIN: 0
PHOTOPHOBIA: 0
COLOR CHANGE: 0
BLOOD IN STOOL: 0
DIARRHEA: 0
CHEST TIGHTNESS: 0
BACK PAIN: 0
CONSTIPATION: 0
COUGH: 0
NAUSEA: 0
VOMITING: 0
SHORTNESS OF BREATH: 0

## 2022-05-24 DIAGNOSIS — Z95.0 CARDIAC PACEMAKER IN SITU: Primary | ICD-10-CM

## 2022-06-06 ENCOUNTER — HOSPITAL ENCOUNTER (OUTPATIENT)
Age: 87
Discharge: HOME | End: 2022-06-06
Payer: MEDICARE

## 2022-06-06 ENCOUNTER — NURSE ONLY (OUTPATIENT)
Dept: CARDIOLOGY CLINIC | Age: 87
End: 2022-06-06

## 2022-06-06 ENCOUNTER — HOSPITAL ENCOUNTER (OUTPATIENT)
Dept: GENERAL RADIOLOGY | Age: 87
Discharge: HOME | End: 2022-06-06
Payer: MEDICARE

## 2022-06-06 DIAGNOSIS — Z01.818 PRE-PROCEDURAL EXAMINATION: ICD-10-CM

## 2022-06-06 LAB
ANION GAP SERPL CALCULATED.3IONS-SCNC: 13 MMOL/L (ref 4–16)
APTT: 32.8 SECONDS (ref 25.1–37.1)
BASOPHILS ABSOLUTE: 0.1 K/CU MM
BASOPHILS RELATIVE PERCENT: 1.1 % (ref 0–1)
BUN BLDV-MCNC: 32 MG/DL (ref 6–23)
CALCIUM SERPL-MCNC: 8.9 MG/DL (ref 8.3–10.6)
CHLORIDE BLD-SCNC: 100 MMOL/L (ref 99–110)
CO2: 27 MMOL/L (ref 21–32)
CREAT SERPL-MCNC: 1.8 MG/DL (ref 0.9–1.3)
DIFFERENTIAL TYPE: ABNORMAL
EOSINOPHILS ABSOLUTE: 0.2 K/CU MM
EOSINOPHILS RELATIVE PERCENT: 3 % (ref 0–3)
GFR AFRICAN AMERICAN: 43 ML/MIN/1.73M2
GFR NON-AFRICAN AMERICAN: 35 ML/MIN/1.73M2
GLUCOSE BLD-MCNC: 83 MG/DL (ref 70–99)
HCT VFR BLD CALC: 36.7 % (ref 42–52)
HEMOGLOBIN: 11.2 GM/DL (ref 13.5–18)
IMMATURE NEUTROPHIL %: 0.6 % (ref 0–0.43)
INR BLD: 0.88 INDEX
LYMPHOCYTES ABSOLUTE: 1.5 K/CU MM
LYMPHOCYTES RELATIVE PERCENT: 28.5 % (ref 24–44)
MAGNESIUM: 2.3 MG/DL (ref 1.8–2.4)
MCH RBC QN AUTO: 29.5 PG (ref 27–31)
MCHC RBC AUTO-ENTMCNC: 30.5 % (ref 32–36)
MCV RBC AUTO: 96.6 FL (ref 78–100)
MONOCYTES ABSOLUTE: 0.6 K/CU MM
MONOCYTES RELATIVE PERCENT: 11.3 % (ref 0–4)
NUCLEATED RBC %: 0 %
PDW BLD-RTO: 15 % (ref 11.7–14.9)
PHOSPHORUS: 3.7 MG/DL (ref 2.5–4.9)
PLATELET # BLD: 216 K/CU MM (ref 140–440)
PMV BLD AUTO: 10 FL (ref 7.5–11.1)
POTASSIUM SERPL-SCNC: 4.2 MMOL/L (ref 3.5–5.1)
PROTHROMBIN TIME: 11.4 SECONDS (ref 11.7–14.5)
RBC # BLD: 3.8 M/CU MM (ref 4.6–6.2)
SEGMENTED NEUTROPHILS ABSOLUTE COUNT: 3 K/CU MM
SEGMENTED NEUTROPHILS RELATIVE PERCENT: 55.5 % (ref 36–66)
SODIUM BLD-SCNC: 140 MMOL/L (ref 135–145)
TOTAL IMMATURE NEUTOROPHIL: 0.03 K/CU MM
TOTAL NUCLEATED RBC: 0 K/CU MM
WBC # BLD: 5.4 K/CU MM (ref 4–10.5)

## 2022-06-06 PROCEDURE — 36415 COLL VENOUS BLD VENIPUNCTURE: CPT

## 2022-06-06 PROCEDURE — 85610 PROTHROMBIN TIME: CPT

## 2022-06-06 PROCEDURE — 71046 X-RAY EXAM CHEST 2 VIEWS: CPT

## 2022-06-06 PROCEDURE — 80048 BASIC METABOLIC PNL TOTAL CA: CPT

## 2022-06-06 PROCEDURE — 83735 ASSAY OF MAGNESIUM: CPT

## 2022-06-06 PROCEDURE — 85025 COMPLETE CBC W/AUTO DIFF WBC: CPT

## 2022-06-06 PROCEDURE — 85730 THROMBOPLASTIN TIME PARTIAL: CPT

## 2022-06-06 PROCEDURE — 84100 ASSAY OF PHOSPHORUS: CPT

## 2022-06-06 NOTE — PROGRESS NOTES
Patient here in office and educated on Generator Change Dual Chamber Pacemaker, schedule for 6/9/2022 @ 1130, with arrival @ 0930, @ [facility]; risk explained; and consents signed. Also copy of orders given for labs and CXR due 6/6/2022 at [facility]. Instruction given to patient to :  NPO after midnight the night before procedure; call hospital at [phone] to pre-register. May take rest of morning meds of procedure. Patient voiced understanding. Copies of consent & info scanned in chart. Patient informed of instructions and guidance for performing test.  Throat swab performed. Swab placed into labeled collection tube. Collection tube and lab order placed in plastic bag. Bag placed in biohazard bag and placed in fridge.  called for . Patient instructed to self quarantine until procedure.

## 2022-06-07 ENCOUNTER — TELEPHONE (OUTPATIENT)
Dept: CARDIOLOGY CLINIC | Age: 87
End: 2022-06-07

## 2022-06-07 NOTE — TELEPHONE ENCOUNTER
Patient stopped in the office this morning with questions/concerns that insurance company states they have not received request for prior authorization for patients scheduled procedure of Dual Chamber Pacemaker Implant scheduled Thursday 6/9/2022. Reviewed prior authorization list and per list request submitted 5/25/2022 and states no authorization required. Did provide this information to patient. Patient voiced understanding. Also states he believes he does have a DNR, but did not have document to provide today. Not sure if patient has DNR or living will. Did advise patient to bring document to hospital morning of procedure and information will be updated accordingly. Patient again voiced understanding. Did ask that patient call this office with any further questions or concerns. Patient again voiced understanding.

## 2022-06-08 ENCOUNTER — HOSPITAL ENCOUNTER (OUTPATIENT)
Age: 87
Setting detail: SPECIMEN
Discharge: HOME | End: 2022-06-08
Payer: MEDICARE

## 2022-06-08 PROCEDURE — U0003 INFECTIOUS AGENT DETECTION BY NUCLEIC ACID (DNA OR RNA); SEVERE ACUTE RESPIRATORY SYNDROME CORONAVIRUS 2 (SARS-COV-2) (CORONAVIRUS DISEASE [COVID-19]), AMPLIFIED PROBE TECHNIQUE, MAKING USE OF HIGH THROUGHPUT TECHNOLOGIES AS DESCRIBED BY CMS-2020-01-R: HCPCS

## 2022-06-08 PROCEDURE — U0005 INFEC AGEN DETEC AMPLI PROBE: HCPCS

## 2022-06-09 ENCOUNTER — HOSPITAL ENCOUNTER (OUTPATIENT)
Dept: CARDIAC CATH/INVASIVE PROCEDURES | Age: 87
Discharge: HOME | End: 2022-06-09
Attending: INTERNAL MEDICINE | Admitting: INTERNAL MEDICINE
Payer: MEDICARE

## 2022-06-09 VITALS
OXYGEN SATURATION: 95 % | HEIGHT: 69 IN | WEIGHT: 182 LBS | HEART RATE: 62 BPM | DIASTOLIC BLOOD PRESSURE: 58 MMHG | TEMPERATURE: 97 F | BODY MASS INDEX: 26.96 KG/M2 | SYSTOLIC BLOOD PRESSURE: 130 MMHG | RESPIRATION RATE: 16 BRPM

## 2022-06-09 LAB
ABO/RH: NORMAL
ANTIBODY SCREEN: NEGATIVE
SARS-COV-2: NOT DETECTED

## 2022-06-09 PROCEDURE — 6360000004 HC RX CONTRAST MEDICATION

## 2022-06-09 PROCEDURE — 33228 REMV&REPLC PM GEN DUAL LEAD: CPT

## 2022-06-09 PROCEDURE — 86900 BLOOD TYPING SEROLOGIC ABO: CPT

## 2022-06-09 PROCEDURE — 86850 RBC ANTIBODY SCREEN: CPT

## 2022-06-09 PROCEDURE — 86901 BLOOD TYPING SEROLOGIC RH(D): CPT

## 2022-06-09 PROCEDURE — 2500000003 HC RX 250 WO HCPCS

## 2022-06-09 PROCEDURE — C1785 PMKR, DUAL, RATE-RESP: HCPCS

## 2022-06-09 PROCEDURE — 6360000002 HC RX W HCPCS

## 2022-06-09 PROCEDURE — 6370000000 HC RX 637 (ALT 250 FOR IP)

## 2022-06-09 PROCEDURE — 2709999900 HC NON-CHARGEABLE SUPPLY

## 2022-06-09 PROCEDURE — 33228 REMV&REPLC PM GEN DUAL LEAD: CPT | Performed by: INTERNAL MEDICINE

## 2022-06-09 PROCEDURE — 2720000010 HC SURG SUPPLY STERILE

## 2022-06-09 PROCEDURE — C1894 INTRO/SHEATH, NON-LASER: HCPCS

## 2022-06-09 PROCEDURE — 2580000003 HC RX 258

## 2022-06-09 RX ORDER — DOXYCYCLINE HYCLATE 100 MG
100 TABLET ORAL 2 TIMES DAILY
Qty: 14 TABLET | Refills: 0 | Status: SHIPPED | OUTPATIENT
Start: 2022-06-09 | End: 2022-06-16

## 2022-06-09 ASSESSMENT — ENCOUNTER SYMPTOMS
DIARRHEA: 0
WHEEZING: 0
VOMITING: 0
ABDOMINAL PAIN: 0
CONSTIPATION: 0
BLOOD IN STOOL: 0
BACK PAIN: 0
EYE PAIN: 0
COUGH: 0
CHEST TIGHTNESS: 0
COLOR CHANGE: 0
PHOTOPHOBIA: 0
SHORTNESS OF BREATH: 0
NAUSEA: 0

## 2022-06-09 NOTE — PROGRESS NOTES
Outpatient Pharmacy Progress Note for Meds-to-Beds    Total number of Prescriptions Filled: 1  The following medications were dispensed to the patient during the discharge process:  Doxycycline     Additional Documentation:  Medications given to nurses at Cath Lab to provide to patient. Thank you for letting us serve your patients.   [address]    [address], [address] [facility]    Phone: [phone]    Fax: [phone]

## 2022-06-09 NOTE — H&P
Electrophysiology H&p Note      Reason for consultation:  Pacemaker    Chief complaint : Pacemaker at end of battery life    Referring physician: Dr. Vicky Ordonez      Primary care physician: Donna Kessler MD      History of Present Illness: Today visit (06/09/22)    Patient here for Pacemaker generator change. No change in H&P noted from previous clinic visit. Previous visit:   Patient is a 80-year-old female with history of hypertension, hyperlipidemia, CAD, complete heart block s/p pacemaker here for pacemaker at end of battery life. Patient denies any cardiac symptoms at this time. Patient denies chest pain, shortness of breath, tiredness or weakness. Patient denies palpitations. Patient drinks 1 to 2 cups of decaf caffeine he does not smoke does not drink alcohol does not exercise      Pastmedical history:   Past Medical History:   Diagnosis Date    Arthritis     CAD (coronary artery disease)     H/O Doppler carotid ultrasound 01/30/2020    Mod -Severe disease Right ICA, Mild disease Left ICA.  H/O echocardiogram 01/30/2020    EF 55-60%, MOD AS, Mild: PHTN, MR, TR & AR. Aortic root 3.8 cm.  Hx of Doppler echocardiogram 02/25/2022    EF 50-55% Mod LV hypertrophy. Mildly dialted LA. Heavily calcified aortic valve iwht mod to severe AS. Mitral annular calcification. Mild TR and MR. Mod AR. Dilated aortic root.  Hyperlipidemia     Hypertension     Spinal stenosis        Surgical history :   Past Surgical History:   Procedure Laterality Date    BACK SURGERY      CAROTID ENDARTERECTOMY      CATARACT REMOVAL      CHOLECYSTECTOMY      CORONARY ANGIOPLASTY WITH STENT PLACEMENT      PACEMAKER PLACEMENT         Family history:   Family History   Problem Relation Age of Onset    Cancer Sister     Diabetes Brother        Social history :  reports that he has never smoked.  He has never used smokeless tobacco. He reports that he does not drink alcohol and does not use drugs. Allergies   Allergen Reactions    Adhesive Tape     Adhesive Tape     Diatrizoate      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  Penicillins     Statins      Leg cramping.  Sulfa Antibiotics        No current facility-administered medications on file prior to encounter. Current Outpatient Medications on File Prior to Encounter   Medication Sig Dispense Refill    methocarbamol (ROBAXIN) 500 MG tablet methocarbamol 500 mg tablet   take 1/2 tab to one at night      metoprolol succinate (TOPROL XL) 25 MG extended release tablet Take 12.5 mg by mouth daily      rOPINIRole (REQUIP) 0.25 MG tablet Take 0.25 mg by mouth nightly      oxyCODONE-acetaminophen (PERCOCET) 5-325 MG per tablet Take 1 tablet by mouth 2 times daily.  tiZANidine (ZANAFLEX) 2 MG tablet Take 2 mg by mouth daily       bisacodyl (DULCOLAX) 5 MG EC tablet Take 5 mg by mouth daily as needed for Constipation      acetaminophen (TYLENOL) 325 MG tablet Take 650 mg by mouth as needed for Pain       docusate sodium (COLACE) 100 MG capsule Take 100 mg by mouth 2 times daily as needed       Omega-3 Fatty Acids (FISH OIL) 1000 MG CPDR Take 3,000 mg by mouth      Multiple Vitamins-Minerals (MULTIVITAMIN ADULT PO) Take by mouth      ezetimibe (ZETIA) 10 MG tablet Take 1 tablet by mouth      furosemide (LASIX) 40 MG tablet Take 1 tablet by mouth      nitroGLYCERIN (NITROSTAT) 0.4 MG SL tablet Place 0.4 mg under the tongue every 5 minutes as needed for Chest pain up to max of 3 total doses. If no relief after 1 dose, call 911.  aspirin 81 MG tablet Take 81 mg by mouth daily         Review of Systems:   Review of Systems   Constitutional: Negative for activity change, chills, fatigue and fever. HENT: Negative for congestion, ear pain and tinnitus. Eyes: Negative for photophobia, pain and visual disturbance.    Respiratory: Negative for cough, chest tightness, shortness of breath and wheezing. Cardiovascular: Negative for chest pain, palpitations and leg swelling. Gastrointestinal: Negative for abdominal pain, blood in stool, constipation, diarrhea, nausea and vomiting. Endocrine: Negative for cold intolerance and heat intolerance. Genitourinary: Negative for dysuria, flank pain and hematuria. Musculoskeletal: Negative for arthralgias, back pain, myalgias and neck stiffness. Skin: Negative for color change and rash. Allergic/Immunologic: Negative for food allergies. Neurological: Negative for dizziness, light-headedness, numbness and headaches. Hematological: Does not bruise/bleed easily. Psychiatric/Behavioral: Negative for agitation, behavioral problems and confusion. Examination:      There were no vitals filed for this visit. There is no height or weight on file to calculate BMI. Physical Exam  Constitutional:       Appearance: Normal appearance. He is not ill-appearing. HENT:      Head: Normocephalic and atraumatic. Mouth/Throat:      Mouth: Mucous membranes are moist.   Eyes:      Conjunctiva/sclera: Conjunctivae normal.   Cardiovascular:      Rate and Rhythm: Normal rate and regular rhythm. Heart sounds: No murmur heard. Pulmonary:      Effort: Pulmonary effort is normal.      Breath sounds: No rales. Abdominal:      General: Abdomen is flat. Palpations: Abdomen is soft. Musculoskeletal:         General: No tenderness. Normal range of motion. Cervical back: Normal range of motion. Right lower leg: No edema. Left lower leg: No edema. Skin:     General: Skin is warm and dry. Neurological:      General: No focal deficit present. Mental Status: He is alert and oriented to person, place, and time.                CBC:   Lab Results   Component Value Date    WBC 5.4 06/06/2022    HGB 11.2 06/06/2022    HCT 36.7 06/06/2022     06/06/2022     Lipids:  Lab Results   Component Value Date    CHOL 181 12/09/2021    TRIG 181 12/09/2021    HDL 54 12/09/2021    LDLCALC 91 12/09/2021     PT/INR:   Lab Results   Component Value Date    INR 0.88 06/06/2022        BMP:    Lab Results   Component Value Date     06/06/2022    K 4.2 06/06/2022     06/06/2022    CO2 27 06/06/2022    BUN 32 (H) 06/06/2022     CMP:   Lab Results   Component Value Date    AST 23 12/09/2021    PROT 6.9 08/27/2021    BILITOT 0.6 12/09/2021    ALKPHOS 74 12/09/2021     TSH:  No results found for: TSH, T4    EKGINTERPRETATION - EKG Interpretation:        IMPRESSION / RECOMMENDATIONS:     Pacemaker at end of battery life    Complete heart block sp pacemaker  CKD -creatinine at 2.1 baseline is 1.7-2.1  HLD on zetia  HTN on metoprolol and lasix - BP controlled      80year-old patient with a history of complete heart block with RV pacing dependent. Pacemaker is end of battery life  Here for generator change  The risks including, but not limited to, the risks of vascular injury, bleeding, infection, device malfunction, lead dislodgement, radiation exposure, injury to cardiac and surrounding structures (including pneumothorax), stroke, myocardial infarction and death were discussed in detail. The patient opted to proceed with the device implantation. Patient will need temporary pacemaker as he is dependent. LVEF is 50-55%  So no upgrade is needed    Thanks again for allowing me to participate in care of this patient. Please call me if you have any questions. With best regards. Niko Conway MD, 6/9/2022 10:51 AM     Please note this report has been partially produced using speech recognition software and may contain errors related to that system including errors in grammar, punctuation, and spelling, as well as words and phrases that may be inappropriate. If there are any questions or concerns please feel free to contact the dictating provider for clarification.

## 2022-06-09 NOTE — OP NOTE
PROCEDURE PERFORMED:      1. New dual chamber pacemaker Generator placement  2. Old dual chamber pacemaker Generator removal    Attending : Jeovany Newman MD    REFERRING PHYSICIAN: Dr. Cowart Kill: None. ESTIMATED BLOOD LOSS: 10 cc. ANESTHESIA: Versed, Fentanyl. OTHER MEDICATIONS: Cefazolin 2 g IV preoperatively. HISTORY:  Patient with hx of complete heart block s/p pacemaker [facility] The patient was brought to the electrophysiology laboratory in stable condition. The patient was in a fasting, nonsedated state. The risks, benefits and alternatives of the procedure were discussed with the patient. The risks including, but not limited to, the risks of infection, bleeding, lead or device malfunction were all discussed. The patient considered his treatment options and decided to proceed with the pacemaker generator change. Temporary RV pacemaker wire was placed in the RV under fluoroscopy from 6 F femoral venous sheath (which was placed using US guided seldinger technique. Temporary pacer was placed as patient is in complete heart block and we did not want to loose capture for the heart if the leads malfunctioned during generator change. The patient was prepped and draped in a sterile fashion. An approximately 3 -cm incision was made over the existing scar in the left pectoral area after administration of lidocaine. Using electrocautery and blunt dissection, the pocket was accessed and the device was externalized. Each lead was disconnected from old device and then old device was removed from the pocket. Each of the leads was electronically analyzed for proper function. The pocket was irrigated with an antibiotic solution. The leads were then connected to the new pulse generator and placed into the cleaned pocket. Final parameters were obtained and are detailed below.  The pocket was closed in three successive layers using 2-0, 2-0 and 4-0 Vicryl suture material. Steri-Strips and a pressure dressing were applied. The patient was transported to the holding area in stable condition. IMPLANTED MATERIALS:             SUMMARY: Successful pacemaker generator change. RECOMMENDATIONS:   1. Bed rest x 2 hours  2. Discharge home in 2 hours if stable  3.  Follow up in the EP clinic in 1-2 weeks for wound and device evaluation

## 2022-06-21 ENCOUNTER — NURSE ONLY (OUTPATIENT)
Dept: CARDIOLOGY CLINIC | Age: 87
End: 2022-06-21

## 2022-06-21 ENCOUNTER — OFFICE VISIT (OUTPATIENT)
Dept: CARDIOLOGY CLINIC | Age: 87
End: 2022-06-21
Payer: MEDICARE

## 2022-06-21 VITALS — TEMPERATURE: 98.2 F

## 2022-06-21 VITALS
SYSTOLIC BLOOD PRESSURE: 120 MMHG | OXYGEN SATURATION: 97 % | HEIGHT: 69 IN | BODY MASS INDEX: 26.25 KG/M2 | WEIGHT: 177.2 LBS | DIASTOLIC BLOOD PRESSURE: 66 MMHG | HEART RATE: 60 BPM

## 2022-06-21 DIAGNOSIS — Z95.0 STATUS POST PLACEMENT OF CARDIAC PACEMAKER: Primary | ICD-10-CM

## 2022-06-21 DIAGNOSIS — I10 ESSENTIAL HYPERTENSION: ICD-10-CM

## 2022-06-21 DIAGNOSIS — Z95.0 PACEMAKER: ICD-10-CM

## 2022-06-21 DIAGNOSIS — I65.23 BILATERAL CAROTID ARTERY STENOSIS: Primary | ICD-10-CM

## 2022-06-21 DIAGNOSIS — I48.0 PAF (PAROXYSMAL ATRIAL FIBRILLATION) (HCC): ICD-10-CM

## 2022-06-21 DIAGNOSIS — I25.118 CORONARY ARTERY DISEASE OF NATIVE ARTERY OF NATIVE HEART WITH STABLE ANGINA PECTORIS (HCC): ICD-10-CM

## 2022-06-21 DIAGNOSIS — I35.0 NONRHEUMATIC AORTIC VALVE STENOSIS: ICD-10-CM

## 2022-06-21 PROCEDURE — 99024 POSTOP FOLLOW-UP VISIT: CPT | Performed by: INTERNAL MEDICINE

## 2022-06-21 PROCEDURE — 99214 OFFICE O/P EST MOD 30 MIN: CPT | Performed by: NURSE PRACTITIONER

## 2022-06-21 PROCEDURE — 1123F ACP DISCUSS/DSCN MKR DOCD: CPT | Performed by: NURSE PRACTITIONER

## 2022-06-21 ASSESSMENT — ENCOUNTER SYMPTOMS: SHORTNESS OF BREATH: 0

## 2022-06-21 NOTE — PATIENT INSTRUCTIONS
Please be informed that if you contact our office outside of normal business hours the physician on call cannot help with any scheduling or rescheduling issues, procedure instruction questions or any type of medication issue. We advise you for any urgent/emergency that you go to the nearest emergency room! PLEASE CALL OUR OFFICE DURING NORMAL BUSINESS HOURS    Monday - Friday   8 am to 5 pm    [place]: [place] [address]: [phone]    [place]:  [phone]    **It is YOUR responsibilty to bring medication bottles and/or updated medication list to [address] [place] [address].  This will allow us to better serve you and all your healthcare needs**

## 2022-06-21 NOTE — PROGRESS NOTES
Patient seen for site check post pacer implant. Dressing removed. No signs of inflammation or infection noted. Patient had a skin tear upon discharge from hospital. There is a mesh dressing over the tear and physician was advised. Mesh dressing left in place. Sterile dressing applied and taped with paper tape. Patient given appointment with physician tomorrow. Patient has no complaints of pain or discomfort.

## 2022-06-21 NOTE — PROGRESS NOTES
[facility] PHYSICAL [facility] [address], [address]  Phone: [phone]    Fax [phone]                  Santos Morales MD, Yesy Yee MD, [address] [facility], MD, MD Pepe Ornelas MD, Margo Young MD, Maliha Ribeiro MD, [address] [facility], [facility]        Cardiology Progress Note      6/21/2022    RE: Concepcion Rojas  (6/20/1926)                             Primary cardiologist: Dr. Pepe Hamm       Subjective:  CC:   1. Bilateral carotid artery stenosis    2. PAF (paroxysmal atrial fibrillation) ([address]    3. Nonrheumatic aortic valve stenosis    4. Coronary artery disease of native artery of native heart with stable angina pectoris ([address]    5. Pacemaker    6. Essential hypertension        HPI: Concepcion Rojas, who is a  80y.o. year old male with a past medical history as listed below. Patient presents to the office for follow up on CAD, HTN, PAF, carotid stenosis, aortic valve stenosis, pacemaker, and hyperlipidemia. Patient has ongoing chest pain which is being conservatively treated. Intervention was previously discussed with patient and Dr. Ivette Hanna who both agreed to hold off on intervention at this time. Patient had generator change of PPM on 6/14/22. Patient is  compliant with medications. Patient denies any chest pain, shortness of breath, dizziness, syncope, or palpitations. Past Medical History:   Diagnosis Date    Arthritis     CAD (coronary artery disease)     H/O Doppler carotid ultrasound 01/30/2020    Mod -Severe disease Right ICA, Mild disease Left ICA.  H/O echocardiogram 01/30/2020    EF 55-60%, MOD AS, Mild: PHTN, MR, TR & AR. Aortic root 3.8 cm.  Hx of Doppler echocardiogram 02/25/2022    EF 50-55% Mod LV hypertrophy. Mildly dialted LA. Heavily calcified aortic valve iwht mod to severe AS. Mitral annular calcification. Mild TR and MR. Mod AR. Dilated aortic root.  Hyperlipidemia     Hypertension     Spinal stenosis        Current Outpatient Medications   Medication Sig Dispense Refill    methocarbamol (ROBAXIN) 500 MG tablet methocarbamol 500 mg tablet   take 1/2 tab to one at night      metoprolol succinate (TOPROL XL) 25 MG extended release tablet Take 12.5 mg by mouth daily      rOPINIRole (REQUIP) 0.25 MG tablet Take 0.25 mg by mouth nightly      oxyCODONE-acetaminophen (PERCOCET) 5-325 MG per tablet Take 1 tablet by mouth 2 times daily.  tiZANidine (ZANAFLEX) 2 MG tablet Take 2 mg by mouth daily       bisacodyl (DULCOLAX) 5 MG EC tablet Take 5 mg by mouth daily as needed for Constipation      acetaminophen (TYLENOL) 325 MG tablet Take 650 mg by mouth as needed for Pain       docusate sodium (COLACE) 100 MG capsule Take 100 mg by mouth 2 times daily as needed       Omega-3 Fatty Acids (FISH OIL) 1000 MG CPDR Take 3,000 mg by mouth      Multiple Vitamins-Minerals (MULTIVITAMIN ADULT PO) Take by mouth      ezetimibe (ZETIA) 10 MG tablet Take 1 tablet by mouth      furosemide (LASIX) 40 MG tablet Take 1 tablet by mouth      nitroGLYCERIN (NITROSTAT) 0.4 MG SL tablet Place 0.4 mg under the tongue every 5 minutes as needed for Chest pain up to max of 3 total doses. If no relief after 1 dose, call 911.  aspirin 81 MG tablet Take 81 mg by mouth daily       No current facility-administered medications for this visit. Review of Systems:  Review of Systems   Respiratory: Negative for shortness of breath. Cardiovascular: Negative for chest pain, palpitations and leg swelling. Musculoskeletal: Negative. Skin: Negative. Neurological: Negative for dizziness and weakness. All other systems reviewed and are negative.        Objective:      Physical Exam:  /66 (Site: Left Upper Arm, Position: Sitting, Cuff Size: Large Adult)   Pulse 60   Ht 5' 9\" (1.753 m)   Wt 177 lb 3.2 oz (80.4 kg) SpO2 97%   BMI 26.17 kg/m²   Wt Readings from Last 3 Encounters:   06/21/22 177 lb 3.2 oz (80.4 kg)   06/09/22 182 lb (82.6 kg)   04/13/22 185 lb 12.8 oz (84.3 kg)     Body mass index is 26.17 kg/m². Physical exam:  Physical Exam  Constitutional:       Appearance: He is well-developed. Cardiovascular:      Rate and Rhythm: Normal rate and regular rhythm. Pulses: Intact distal pulses. Dorsalis pedis pulses are 2+ on the right side and 2+ on the left side. Posterior tibial pulses are 2+ on the right side and 2+ on the left side. Heart sounds: S1 normal and S2 normal. Murmur heard. Harsh midsystolic murmur is present with a grade of 3/6 at the upper right sternal border radiating to the neck. Pulmonary:      Effort: Pulmonary effort is normal.      Breath sounds: Normal breath sounds. Musculoskeletal:         General: Normal range of motion. Right lower leg: Edema present. Left lower leg: No edema. Skin:     General: Skin is warm and dry. Neurological:      Mental Status: He is alert and oriented to person, place, and time. DATA:  No results found for: CKTOTAL, CKMB, CKMBINDEX, TROPONINI  BNP:  No results found for: BNP  PT/INR:  No results found for: PTINR  No results found for: LABA1C  Lab Results   Component Value Date    CHOL 181 12/09/2021    TRIG 181 12/09/2021    HDL 54 12/09/2021    LDLCALC 91 12/09/2021     Lab Results   Component Value Date    ALT 13 12/09/2021    AST 23 12/09/2021     TSH:  No results found for: TSH    Vitals:    06/21/22 1148   BP: 120/66   Pulse: 60   SpO2: 97%       Echo:1/30/20  Left ventricular systolic function is normal with an ejection fraction of   55-60%. Mild septal wall asymmetrical left ventricular hypertrophy. Dilatation of the aortic root measuring 3.8 cm. Moderate aortic stenosis with mean gradient of 23 mmHg. Doppler evaluation reveals mild aortic, mitral, and tricuspid regurgitation.    Right ventricular systolic pressure of 39 mmHg consistent with mild   pulmonary hypertension. No evidence of pericardial effusion. The ASCVD Risk score (Jacob Lara, et al., 2013) failed to calculate for the following reasons: The 2013 ASCVD risk score is only valid for ages 36 to 78      Assessment/ Plan:     Nonrheumatic aortic valve stenosis   -Echo in 2020 showed moderate aortic stenosis. Given patient's age conservative management. Reports shortness of breath on ambulation but is at baseline. Continue with Lasix 40 mg daily, Lopressor 25 mg twice daily    PAF  -High fall risk, no anticoagulation at this time.     Pacemaker   -Patient had PPM generator change 6/14/22. First interrogation post-op today.      Essential hypertension   -Stable, continue Lopressor 25 mg twice daily and Lasix 40 mg daily.     Bilateral carotid artery stenosis   -CEA in the past.  Ultrasound in 2020 showed moderate to severe disease of the right proximal I CEA, mild disease of the left proximal ICA.     Coronary artery disease of native artery of native heart with stable angina pectoris [facility])   -Patient reported 6 stents in the past.   Was recently seen in emergency department with complaints of chest pain. Patient did not want to be admitted, no ACS seen, x-ray unremarkable. Patient reports pending DNR paperwork. Intervention was previously discussed with patient and Dr. Romi Valdez who both agreed to hold off on intervention at this time. Conservative management at this point due to age. Continue with Lasix, Lopressor, Zetia, and aspirin. No acute chest pain, occasional on exertion but did not tolerate Imdur.      Dyslipidemia   -At or near goal NA    -No recent labs available- conservative therapy.   -He is to continue current medications (zetia 10 mg) Hepatic function panel WNL. No abdominal pain. No myalgias.      -The nature of cardiac risk has been fully discussed with this patient.  I have made him aware of his LDL target goal given his cardiovascular risk analysis. I have discussed the appropriate diet. The need for lifelong compliance in order to reduce risk is stressed. A regular exercise program is recommended to help achieve and maintain normal body weight, fitness and improve lipid balance. A written copy of a low fat, low cholesterol diet has been given to the patient. Patient seen, interviewed and examined. Testing was reviewed. Patient is encouraged to exercise even a brisk walk for 30 minutes at least 3 to 4 times a week. Lifestyle and risk factor modificatons discussed. Various goals are discussed and questions answered. Continue current medications. Appropriate prescriptions are addressed. Questions answered and patient verbalizes understanding. Call for any problems, questions, or concerns. Pt is to follow up in 6 months for Cardiac management    Electronically signed by DASH Goncalves CNP on 6/21/2022 at 12:20 PM

## 2022-06-22 ENCOUNTER — OFFICE VISIT (OUTPATIENT)
Dept: CARDIOLOGY CLINIC | Age: 87
End: 2022-06-22
Payer: MEDICARE

## 2022-06-22 VITALS
BODY MASS INDEX: 26.22 KG/M2 | SYSTOLIC BLOOD PRESSURE: 118 MMHG | HEART RATE: 61 BPM | DIASTOLIC BLOOD PRESSURE: 74 MMHG | WEIGHT: 177 LBS | HEIGHT: 69 IN

## 2022-06-22 DIAGNOSIS — Z95.0 PACEMAKER: Primary | ICD-10-CM

## 2022-06-22 PROCEDURE — 33228 REMV&REPLC PM GEN DUAL LEAD: CPT | Performed by: INTERNAL MEDICINE

## 2022-06-22 NOTE — PROGRESS NOTES
Patient incision area healed very well. Patient though has skin breakdown where the adhesive was used for sterile covering. It is also healing well. Under sterile precautions, antibiotic cream was placed and let it open to air till it heals.      Recommended to apply antiobiotic cream daily till healing happens with clean hands    Follow in 10 days

## 2022-06-30 ENCOUNTER — OFFICE VISIT (OUTPATIENT)
Dept: CARDIOLOGY CLINIC | Age: 87
End: 2022-06-30

## 2022-06-30 VITALS
HEART RATE: 64 BPM | SYSTOLIC BLOOD PRESSURE: 128 MMHG | BODY MASS INDEX: 26.82 KG/M2 | WEIGHT: 181.6 LBS | DIASTOLIC BLOOD PRESSURE: 70 MMHG

## 2022-06-30 DIAGNOSIS — Z95.0 PACEMAKER: Primary | ICD-10-CM

## 2022-06-30 PROCEDURE — 99024 POSTOP FOLLOW-UP VISIT: CPT | Performed by: NURSE PRACTITIONER

## 2022-06-30 NOTE — PROGRESS NOTES
Left upper chest site incision is well approximated. Patient still has a small discrete area where there is skin breakdown from the adhesive that was used for the sterile covering. Patient to continue using antibiotic cream daily. Patient to leave the site open to air at times however when going out he needs to cover the incision. Commend to apply antibiotic cream daily till healing happens with clean hands  Patient to follow-up in 2 weeks for site check. Pacemaker interrogated\Device Assessment:    The device is Medtronic pacemaker - Dual Chamber chamber      MRI Compatible : yes    Device interrogation was performed. Mode: DDDR         Sensing is normal. Impedence is normal.  Threshold is normal.     There has not been interval changes. Estimated battery life is 10.3 years     The underlying rhythm is AP,.  86.8 % atrial paced; 99.3 % ventricular paced. Atrial Arrhythmia : No    Non sustained VT episodes : No    Sustained VT episodes : No    Patient activity reported 0.2 hrs/day    The patient is pacemaker dependent.

## 2022-07-07 ENCOUNTER — TELEPHONE (OUTPATIENT)
Dept: CARDIOLOGY CLINIC | Age: 87
End: 2022-07-07

## 2022-07-07 NOTE — LETTER
Cardiology [address] [employer] [facility] [address] [facility]  Phone: [phone]  Fax: [phone]    7/7/2022        [phone]            Dear Gabriel MENDOZA:    This is your [facility] schedule. You can darlene your calendar with these dates. Remember that your device is wireless and should automatically do these checks while you are sleeping. If for any reason I do not get your transmission then I will call you and ask that you send a manual transmission. If you have any questions or concerns, please call and ask for [place] at [phone]. Thank you.

## 2022-07-11 ENCOUNTER — TELEPHONE (OUTPATIENT)
Dept: CARDIOLOGY CLINIC | Age: 87
End: 2022-07-11

## 2022-07-11 NOTE — TELEPHONE ENCOUNTER
Wife called she needs a new script for a walker /seat / side pocket , She went to [facility] they asked if we can fax a new script   [phone]

## 2022-10-07 ENCOUNTER — HOSPITAL ENCOUNTER (OUTPATIENT)
Age: 87
Setting detail: SPECIMEN
Discharge: HOME | End: 2022-10-07
Payer: MEDICARE

## 2022-10-07 LAB
ALBUMIN SERPL-MCNC: 4.3 GM/DL (ref 3.4–5)
ANION GAP SERPL CALCULATED.3IONS-SCNC: 13 MMOL/L (ref 4–16)
BACTERIA: NEGATIVE /HPF
BASOPHILS ABSOLUTE: 0.1 K/CU MM
BASOPHILS RELATIVE PERCENT: 1.4 % (ref 0–1)
BILIRUBIN URINE: NEGATIVE MG/DL
BLOOD, URINE: NEGATIVE
BUN BLDV-MCNC: 25 MG/DL (ref 6–23)
CALCIUM SERPL-MCNC: 9 MG/DL (ref 8.3–10.6)
CHLORIDE BLD-SCNC: 102 MMOL/L (ref 99–110)
CLARITY: CLEAR
CO2: 26 MMOL/L (ref 21–32)
COLOR: YELLOW
CREAT SERPL-MCNC: 2 MG/DL (ref 0.9–1.3)
CREATININE URINE: 78.6 MG/DL (ref 39–259)
DIFFERENTIAL TYPE: ABNORMAL
EOSINOPHILS ABSOLUTE: 0.2 K/CU MM
EOSINOPHILS RELATIVE PERCENT: 3.2 % (ref 0–3)
GFR AFRICAN AMERICAN: 38 ML/MIN/1.73M2
GFR NON-AFRICAN AMERICAN: 31 ML/MIN/1.73M2
GLUCOSE BLD-MCNC: 105 MG/DL (ref 70–99)
GLUCOSE, URINE: NEGATIVE MG/DL
HCT VFR BLD CALC: 39.2 % (ref 42–52)
HEMOGLOBIN: 12.4 GM/DL (ref 13.5–18)
IMMATURE NEUTROPHIL %: 1 % (ref 0–0.43)
KETONES, URINE: NEGATIVE MG/DL
LEUKOCYTE ESTERASE, URINE: ABNORMAL
LYMPHOCYTES ABSOLUTE: 2.4 K/CU MM
LYMPHOCYTES RELATIVE PERCENT: 33.6 % (ref 24–44)
MAGNESIUM: 2.2 MG/DL (ref 1.8–2.4)
MCH RBC QN AUTO: 29.3 PG (ref 27–31)
MCHC RBC AUTO-ENTMCNC: 31.6 % (ref 32–36)
MCV RBC AUTO: 92.7 FL (ref 78–100)
MONOCYTES ABSOLUTE: 0.7 K/CU MM
MONOCYTES RELATIVE PERCENT: 10 % (ref 0–4)
MUCUS: ABNORMAL HPF
NITRITE URINE, QUANTITATIVE: NEGATIVE
NUCLEATED RBC %: 0 %
PDW BLD-RTO: 14.9 % (ref 11.7–14.9)
PH, URINE: 6 (ref 5–8)
PHOSPHORUS: 3.7 MG/DL (ref 2.5–4.9)
PLATELET # BLD: 221 K/CU MM (ref 140–440)
PMV BLD AUTO: 9.5 FL (ref 7.5–11.1)
POTASSIUM SERPL-SCNC: 3.9 MMOL/L (ref 3.5–5.1)
PROT/CREAT RATIO, UR: 0.1
PROTEIN UA: NEGATIVE MG/DL
RBC # BLD: 4.23 M/CU MM (ref 4.6–6.2)
RBC URINE: <1 /HPF (ref 0–3)
SEGMENTED NEUTROPHILS ABSOLUTE COUNT: 3.7 K/CU MM
SEGMENTED NEUTROPHILS RELATIVE PERCENT: 50.8 % (ref 36–66)
SODIUM BLD-SCNC: 141 MMOL/L (ref 135–145)
SPECIFIC GRAVITY UA: 1.02 (ref 1–1.03)
TOTAL IMMATURE NEUTOROPHIL: 0.07 K/CU MM
TOTAL NUCLEATED RBC: 0 K/CU MM
TRICHOMONAS: ABNORMAL /HPF
URINE TOTAL PROTEIN: 6.9 MG/DL
UROBILINOGEN, URINE: 0.2 MG/DL (ref 0.2–1)
WBC # BLD: 7.3 K/CU MM (ref 4–10.5)
WBC UA: 1 /HPF (ref 0–2)

## 2022-10-07 PROCEDURE — 81001 URINALYSIS AUTO W/SCOPE: CPT

## 2022-10-07 PROCEDURE — 84100 ASSAY OF PHOSPHORUS: CPT

## 2022-10-07 PROCEDURE — 82570 ASSAY OF URINE CREATININE: CPT

## 2022-10-07 PROCEDURE — 85025 COMPLETE CBC W/AUTO DIFF WBC: CPT

## 2022-10-07 PROCEDURE — 80048 BASIC METABOLIC PNL TOTAL CA: CPT

## 2022-10-07 PROCEDURE — 84156 ASSAY OF PROTEIN URINE: CPT

## 2022-10-07 PROCEDURE — 82040 ASSAY OF SERUM ALBUMIN: CPT

## 2022-10-07 PROCEDURE — 83735 ASSAY OF MAGNESIUM: CPT

## 2022-10-07 PROCEDURE — 36415 COLL VENOUS BLD VENIPUNCTURE: CPT

## 2022-10-19 NOTE — ASSESSMENT & PLAN NOTE
-CEA in the past.  Ultrasound in 2020 showed moderate to severe disease of the right proximal I CEA, mild disease of the left proximal ICA.
-Echo in 2020 showed moderate aortic stenosis. Given patient's age conservative management. Reports shortness of breath on ambulation but is at baseline.   Continue with Lasix 40 mg daily, Lopressor 25 mg twice daily
-Patient reported 6 stents in the past.   Was recently seen in emergency department with complaints of chest pain. Patient did not want to be admitted, no ACS seen, x-ray unremarkable. Patient reports pending DNR paperwork. Intervention was previously discussed with patient and Dr. Teofilo Gary who both agreed to hold off on intervention at this time. Conservative management at this point due to age. Continue with Lasix, Lopressor, Zetia, and aspirin.    -add Imdur 30 mg daily.
-Stable, continue Lopressor 25 mg twice daily and Lasix 40 mg daily.
-We will interrogate device, has been over a year since device has been interrogated. Patient is pacer dependent. Patient has 8 months of battery life on current interrogation.
No

## 2022-11-09 ENCOUNTER — HOSPITAL ENCOUNTER (EMERGENCY)
Age: 87
Discharge: HOME | End: 2022-11-09
Attending: EMERGENCY MEDICINE
Payer: MEDICARE

## 2022-11-09 VITALS
OXYGEN SATURATION: 96 % | BODY MASS INDEX: 27.25 KG/M2 | DIASTOLIC BLOOD PRESSURE: 74 MMHG | RESPIRATION RATE: 17 BRPM | HEART RATE: 72 BPM | TEMPERATURE: 98.3 F | SYSTOLIC BLOOD PRESSURE: 154 MMHG | WEIGHT: 184 LBS | HEIGHT: 69 IN

## 2022-11-09 DIAGNOSIS — S61.412A SKIN TEAR OF LEFT HAND WITHOUT COMPLICATION, INITIAL ENCOUNTER: Primary | ICD-10-CM
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
## 2022-11-09 PROCEDURE — 90715 TDAP VACCINE 7 YRS/> IM: CPT | Performed by: EMERGENCY MEDICINE

## 2022-11-09 PROCEDURE — 90471 IMMUNIZATION ADMIN: CPT | Performed by: EMERGENCY MEDICINE

## 2022-11-09 PROCEDURE — 12001 RPR S/N/AX/GEN/TRNK 2.5CM/<: CPT

## 2022-11-09 PROCEDURE — 6360000002 HC RX W HCPCS: Performed by: EMERGENCY MEDICINE

## 2022-11-09 PROCEDURE — 99284 EMERGENCY DEPT VISIT MOD MDM: CPT

## 2022-11-09 RX ADMIN — TETANUS TOXOID, REDUCED DIPHTHERIA TOXOID AND ACELLULAR PERTUSSIS VACCINE, ADSORBED 0.5 ML: 5; 2.5; 8; 8; 2.5 SUSPENSION INTRAMUSCULAR at 18:07

## 2022-11-09 ASSESSMENT — PAIN SCALES - GENERAL
PAINLEVEL_OUTOF10: 0
PAINLEVEL_OUTOF10: 3

## 2022-11-09 ASSESSMENT — PAIN - FUNCTIONAL ASSESSMENT: PAIN_FUNCTIONAL_ASSESSMENT: ACTIVITIES ARE NOT PREVENTED

## 2022-11-09 ASSESSMENT — PAIN DESCRIPTION - ORIENTATION: ORIENTATION: LEFT

## 2022-11-09 ASSESSMENT — PAIN DESCRIPTION - LOCATION: LOCATION: HAND

## 2022-11-09 ASSESSMENT — PAIN DESCRIPTION - DESCRIPTORS: DESCRIPTORS: ACHING

## 2022-11-09 NOTE — DISCHARGE INSTRUCTIONS
Return immediately to the emergency department if you experience new or worsened symptoms, chest pain, shortness of breath, abdominal pain, changes in vision/speech/swallowing, weakness or changes in sensation, new or worsened headache, or for any other concerns.
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
## 2022-11-19 ENCOUNTER — HOSPITAL ENCOUNTER (EMERGENCY)
Age: 87
Discharge: HOME | End: 2022-11-19
Attending: EMERGENCY MEDICINE
Payer: OTHER MISCELLANEOUS

## 2022-11-19 ENCOUNTER — APPOINTMENT (OUTPATIENT)
Dept: GENERAL RADIOLOGY | Age: 87
End: 2022-11-19
Payer: OTHER MISCELLANEOUS

## 2022-11-19 VITALS
BODY MASS INDEX: 27.25 KG/M2 | TEMPERATURE: 98.1 F | HEART RATE: 72 BPM | HEIGHT: 69 IN | DIASTOLIC BLOOD PRESSURE: 74 MMHG | OXYGEN SATURATION: 98 % | SYSTOLIC BLOOD PRESSURE: 163 MMHG | RESPIRATION RATE: 18 BRPM | WEIGHT: 184 LBS

## 2022-11-19 DIAGNOSIS — L03.114 CELLULITIS OF LEFT HAND: Primary | ICD-10-CM

## 2022-11-19 PROCEDURE — 73130 X-RAY EXAM OF HAND: CPT

## 2022-11-19 PROCEDURE — 99283 EMERGENCY DEPT VISIT LOW MDM: CPT

## 2022-11-19 RX ORDER — DOXYCYCLINE HYCLATE 100 MG
100 TABLET ORAL 2 TIMES DAILY
Qty: 20 TABLET | Refills: 0 | Status: SHIPPED | OUTPATIENT
Start: 2022-11-19 | End: 2022-11-29

## 2022-11-19 ASSESSMENT — PAIN SCALES - GENERAL: PAINLEVEL_OUTOF10: 2

## 2022-11-19 NOTE — DISCHARGE INSTRUCTIONS
Return immediately to the emergency department if you experience new or worsened symptoms, increased pain, spreading redness, purulent discharge from the wound, fever, or for any other concerns. Elevate your hand elevated above your heart. Rest, ice, elevate as discussed.
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
## 2022-12-10 PROCEDURE — 93294 REM INTERROG EVL PM/LDLS PM: CPT | Performed by: INTERNAL MEDICINE

## 2022-12-10 PROCEDURE — 93296 REM INTERROG EVL PM/IDS: CPT | Performed by: INTERNAL MEDICINE

## 2022-12-12 ENCOUNTER — PROCEDURE VISIT (OUTPATIENT)
Dept: CARDIOLOGY CLINIC | Age: 87
End: 2022-12-12
Payer: MEDICARE

## 2022-12-12 DIAGNOSIS — I44.30 AV BLOCK: ICD-10-CM

## 2022-12-12 DIAGNOSIS — Z95.0 CARDIAC PACEMAKER IN SITU: Primary | ICD-10-CM

## 2022-12-12 DIAGNOSIS — I49.5 SINUS NODE DYSFUNCTION (HCC): ICD-10-CM

## 2022-12-12 NOTE — PROCEDURES
Lulu Veronica  80 y.o., male  6/20/1926    Sania Mclean MD    Pacer analysis is reviewed and filed in the pacer chart. Analysis is consistent with normal dual-chamber MRI safe [employer] pacer function with stable leads and appropriate battery status for the age of the device. Remaining average battery longevity is 10.8 years. Pacer is programmed to DDDR mode at a lower rate of 60 bpm and 99.9% pacing in the ventricle 87% pacing in the atrium. Recommend continued every three month pacer check and follow up office visit as scheduled.     Tosha Avila MD, 12/12/2022 3:36 PM

## 2022-12-13 ENCOUNTER — OFFICE VISIT (OUTPATIENT)
Dept: CARDIOLOGY CLINIC | Age: 87
End: 2022-12-13
Payer: MEDICARE

## 2022-12-13 VITALS
OXYGEN SATURATION: 100 % | DIASTOLIC BLOOD PRESSURE: 62 MMHG | HEIGHT: 69 IN | WEIGHT: 190 LBS | HEART RATE: 61 BPM | SYSTOLIC BLOOD PRESSURE: 134 MMHG | BODY MASS INDEX: 28.14 KG/M2

## 2022-12-13 DIAGNOSIS — Z95.0 PACEMAKER: ICD-10-CM

## 2022-12-13 DIAGNOSIS — I65.23 BILATERAL CAROTID ARTERY STENOSIS: Primary | ICD-10-CM

## 2022-12-13 DIAGNOSIS — I10 ESSENTIAL HYPERTENSION: ICD-10-CM

## 2022-12-13 DIAGNOSIS — E78.5 DYSLIPIDEMIA: ICD-10-CM

## 2022-12-13 DIAGNOSIS — I48.0 PAF (PAROXYSMAL ATRIAL FIBRILLATION) (HCC): ICD-10-CM

## 2022-12-13 DIAGNOSIS — I35.0 NONRHEUMATIC AORTIC VALVE STENOSIS: ICD-10-CM

## 2022-12-13 DIAGNOSIS — I25.118 CORONARY ARTERY DISEASE OF NATIVE ARTERY OF NATIVE HEART WITH STABLE ANGINA PECTORIS (HCC): ICD-10-CM

## 2022-12-13 PROCEDURE — 1123F ACP DISCUSS/DSCN MKR DOCD: CPT | Performed by: NURSE PRACTITIONER

## 2022-12-13 PROCEDURE — 99214 OFFICE O/P EST MOD 30 MIN: CPT | Performed by: NURSE PRACTITIONER

## 2022-12-13 ASSESSMENT — ENCOUNTER SYMPTOMS: SHORTNESS OF BREATH: 0

## 2022-12-13 NOTE — PROGRESS NOTES
[facility] PHYSICAL [facility] [address], [address] [facility],Third Floor  Phone: [phone]    Fax [phone]                  Eneida Bejarano MD, Juan Manuel Benjamin MD, Marry Ruano MD, MD Terence Lubin MD, Jazmine Lao MD, Matthew Trinh MD, [address] [facility], [facility], [facility], [facility], [facility]        Cardiology Progress Note      12/13/2022    RE: Urszula Spears  (6/20/1926)                             Primary cardiologist: Dr. Terence Smith       Subjective:  CC:   1. Bilateral carotid artery stenosis    2. PAF (paroxysmal atrial fibrillation) ([address]    3. Nonrheumatic aortic valve stenosis    4. Coronary artery disease of native artery of native heart with stable angina pectoris ([address]    5. Essential hypertension    6. Pacemaker    7. Dyslipidemia        HPI: Urszula Spears, who is a  80y.o. year old male with a past medical history as listed below. Patient presents to the office for follow up on CAD, HTN, PAF, carotid stenosis, aortic valve stenosis, pacemaker, and hyperlipidemia. Previous chest pain has resolved, intervention discussed with patient and Dr. Tianna Lopez who both agreed to hold off on intervention at this time. Patient has severe aortic stenosis but is not currently symptomatic at this time does not want intervention. Patient is  compliant with medications. Patient denies any chest pain, shortness of breath, dizziness, syncope, or palpitations. Past Medical History:   Diagnosis Date    Arthritis     CAD (coronary artery disease)     H/O Doppler carotid ultrasound 01/30/2020    Mod -Severe disease Right ICA, Mild disease Left ICA. H/O echocardiogram 01/30/2020    EF 55-60%, MOD AS, Mild: PHTN, MR, TR & AR. Aortic root 3.8 cm. Hx of Doppler echocardiogram 02/25/2022    EF 50-55% Mod LV hypertrophy. Mildly dialted LA. Heavily calcified aortic valve iwht mod to severe AS. Mitral annular calcification. Mild TR and MR. Mod AR. Dilated aortic root. Hyperlipidemia     Hypertension     Spinal stenosis        Current Outpatient Medications   Medication Sig Dispense Refill    methocarbamol (ROBAXIN) 500 MG tablet methocarbamol 500 mg tablet   take 1/2 tab to one at night      metoprolol succinate (TOPROL XL) 25 MG extended release tablet Take 12.5 mg by mouth daily      rOPINIRole (REQUIP) 0.25 MG tablet Take 0.75 mg by mouth nightly      oxyCODONE-acetaminophen (PERCOCET) 5-325 MG per tablet Take 1 tablet by mouth 2 times daily. tiZANidine (ZANAFLEX) 2 MG tablet Take 2 mg by mouth daily       bisacodyl (DULCOLAX) 5 MG EC tablet Take 5 mg by mouth daily as needed for Constipation      acetaminophen (TYLENOL) 325 MG tablet Take 650 mg by mouth as needed for Pain       docusate sodium (COLACE) 100 MG capsule Take 100 mg by mouth 2 times daily as needed       Omega-3 Fatty Acids (FISH OIL) 1000 MG CPDR Take 3,000 mg by mouth      Multiple Vitamins-Minerals (MULTIVITAMIN ADULT PO) Take by mouth      ezetimibe (ZETIA) 10 MG tablet Take 1 tablet by mouth      furosemide (LASIX) 40 MG tablet Take 1 tablet by mouth      nitroGLYCERIN (NITROSTAT) 0.4 MG SL tablet Place 0.4 mg under the tongue every 5 minutes as needed for Chest pain up to max of 3 total doses. If no relief after 1 dose, call 911.      aspirin 81 MG tablet Take 81 mg by mouth daily      Misc. Devices ([address]) MISC 1 each by Does not apply route once for 1 dose 1 each 0     No current facility-administered medications for this visit. Review of Systems:  Review of Systems   Respiratory:  Negative for shortness of breath. Cardiovascular:  Negative for chest pain, palpitations and leg swelling. Musculoskeletal: Negative. Skin: Negative. Neurological:  Negative for dizziness and weakness. All other systems reviewed and are negative.      Objective:      Physical Exam:  /62 (Site: Left Upper Arm, Position: Sitting, Cuff Size: Medium Adult)   Pulse 61   Ht 5' 8.5\" (1.74 m)   Wt 190 lb (86.2 kg)   SpO2 100%   BMI 28.47 kg/m²   Wt Readings from Last 3 Encounters:   12/13/22 190 lb (86.2 kg)   11/19/22 184 lb (83.5 kg)   11/09/22 184 lb (83.5 kg)     Body mass index is 28.47 kg/m². Physical exam:  Physical Exam  Constitutional:       Appearance: He is well-developed. Cardiovascular:      Rate and Rhythm: Normal rate and regular rhythm. Pulses: Intact distal pulses. Dorsalis pedis pulses are 2+ on the right side and 2+ on the left side. Posterior tibial pulses are 2+ on the right side and 2+ on the left side. Heart sounds: S1 normal and S2 normal. Murmur heard. Harsh midsystolic murmur is present with a grade of 3/6 at the upper right sternal border radiating to the neck. Pulmonary:      Effort: Pulmonary effort is normal.      Breath sounds: Normal breath sounds. Musculoskeletal:         General: Normal range of motion. Right lower leg: Edema present. Left lower leg: No edema. Skin:     General: Skin is warm and dry. Neurological:      Mental Status: He is alert and oriented to person, place, and time. DATA:  No results found for: CKTOTAL, CKMB, CKMBINDEX, TROPONINI  BNP:  No results found for: BNP  PT/INR:  No results found for: PTINR  No results found for: LABA1C  Lab Results   Component Value Date    CHOL 181 12/09/2021    TRIG 181 12/09/2021    HDL 54 12/09/2021    LDLCALC 91 12/09/2021     Lab Results   Component Value Date    ALT 13 12/09/2021    AST 23 12/09/2021     TSH:  No results found for: TSH    Vitals:    12/13/22 1045   BP: 134/62   Pulse: 61   SpO2: 100%       Echo:2/25/22   Left ventricular function is low normal, EF is estimated at 50-55%. Moderate left ventricular hypertrophy. Normal diastolic filling pattern for age. No regional wall motion abnormalities were detected. Mildly dilated left atrium.    Heavily calcified aortic valve with moderate to severe aortic stenosis; mean   PmmHG. PATRIZIA: 1.0cm2. DVI: 0.3. Mitral annular calcification is present. Mild mitral, tricuspid and moderate aortic regurgitation is present. RVSP is 30 mmHg. Dilated aortic root(3.8cm). No evidence of pericardial effusion. Compared to previous study on 2020, the mean gradient of the aortic valve   has increased from 23mmHg, to 29mmhg. The ASCVD Risk score (Giovanna DK, et al., 2019) failed to calculate for the following reasons: The 2019 ASCVD risk score is only valid for ages 36 to 78      Assessment/ Plan:     Nonrheumatic aortic valve stenosis   -Echo 22 showed moderate to severe aortic stenosis. PATRIZIA 1.0 cm 2. Given patient's age conservative management. Reports shortness of breath on ambulation but is at baseline. Continue with Lasix 40 mg daily, Lopressor 25 mg twice daily    PAF  -High fall risk, no anticoagulation at this time. Pacemaker   -Patient had PPM generator change 22. Pacer analysis is reviewed and filed in the pacer chart. Analysis is consistent with normal dual-chamber MRI safe Medtronic pacer function with stable leads and appropriate battery status for the age of the device. Remaining average battery longevity is 10.8 years. Pacer is programmed to DDDR mode at a lower rate of 60 bpm and 99.9% pacing in the ventricle 87% pacing in the atrium. Essential hypertension   -Stable, continue Lopressor 25 mg twice daily and Lasix 40 mg daily. Bilateral carotid artery stenosis   -left CEA in . Ultrasound in  showed moderate to severe disease of the right proximal I CEA, mild disease of the left proximal ICA. Coronary artery disease of native artery of native heart with stable angina pectoris [facility])   -Patient reported 6 stents in the past. Intervention was previously discussed with patient and Dr. Paula Kulkarni who both agreed to hold off on intervention at this time.  Conservative management at this point due to age. Continue with Lasix, Lopressor, Zetia, and aspirin. No acute chest pain, occasional on exertion but did not tolerate Imdur. Dyslipidemia   -At or near goal yes     -He is to continue current medications (zetia 10 mg) Hepatic function panel WNL. No abdominal pain. No myalgias.      -The nature of cardiac risk has been fully discussed with this patient. I have made him aware of his LDL target goal given his cardiovascular risk analysis. I have discussed the appropriate diet. The need for lifelong compliance in order to reduce risk is stressed. A regular exercise program is recommended to help achieve and maintain normal body weight, fitness and improve lipid balance. A written copy of a low fat, low cholesterol diet has been given to the patient. Patient seen, interviewed and examined. Testing was reviewed. Patient is encouraged to exercise even a brisk walk for 30 minutes at least 3 to 4 times a week. Lifestyle and risk factor modificatons discussed. Various goals are discussed and questions answered. Continue current medications. Appropriate prescriptions are addressed. Questions answered and patient verbalizes understanding. Call for any problems, questions, or concerns. Pt is to follow up in 6 months for Cardiac management    Electronically signed by Demetris San.  DASH Quiroz CNP on 12/13/2022 at 11:08 AM

## 2023-01-04 ENCOUNTER — TELEPHONE (OUTPATIENT)
Dept: CARDIOLOGY CLINIC | Age: 88
End: 2023-01-04

## 2023-03-18 PROCEDURE — 93296 REM INTERROG EVL PM/IDS: CPT | Performed by: INTERNAL MEDICINE

## 2023-03-18 PROCEDURE — 93294 REM INTERROG EVL PM/LDLS PM: CPT | Performed by: INTERNAL MEDICINE

## 2023-03-22 ENCOUNTER — PROCEDURE VISIT (OUTPATIENT)
Dept: CARDIOLOGY CLINIC | Age: 88
End: 2023-03-22
Payer: MEDICARE

## 2023-03-22 DIAGNOSIS — I44.30 AV BLOCK: ICD-10-CM

## 2023-03-22 DIAGNOSIS — I49.5 SINUS NODE DYSFUNCTION (HCC): ICD-10-CM

## 2023-03-22 DIAGNOSIS — Z95.0 CARDIAC PACEMAKER IN SITU: Primary | ICD-10-CM

## 2023-04-03 ENCOUNTER — HOSPITAL ENCOUNTER (OUTPATIENT)
Age: 88
Setting detail: SPECIMEN
Discharge: HOME | End: 2023-04-03
Payer: MEDICARE

## 2023-04-03 LAB
ALBUMIN SERPL-MCNC: 4.3 GM/DL (ref 3.4–5)
ANION GAP SERPL CALCULATED.3IONS-SCNC: 13 MMOL/L (ref 4–16)
BACTERIA: NEGATIVE /HPF
BILIRUBIN URINE: NEGATIVE MG/DL
BLOOD, URINE: NEGATIVE
BUN SERPL-MCNC: 34 MG/DL (ref 6–23)
CALCIUM SERPL-MCNC: 9.1 MG/DL (ref 8.3–10.6)
CHLORIDE BLD-SCNC: 104 MMOL/L (ref 99–110)
CLARITY: CLEAR
CO2: 25 MMOL/L (ref 21–32)
COLOR: YELLOW
CREAT SERPL-MCNC: 2.1 MG/DL (ref 0.9–1.3)
CREATININE URINE: 94 MG/DL (ref 39–259)
GFR SERPL CREATININE-BSD FRML MDRD: 28 ML/MIN/1.73M2
GLUCOSE SERPL-MCNC: 157 MG/DL (ref 70–99)
GLUCOSE, URINE: NEGATIVE MG/DL
KETONES, URINE: NEGATIVE MG/DL
LEUKOCYTE ESTERASE, URINE: ABNORMAL
MAGNESIUM: 2.2 MG/DL (ref 1.8–2.4)
MUCUS: ABNORMAL HPF
NITRITE URINE, QUANTITATIVE: NEGATIVE
PH, URINE: 6 (ref 5–8)
PHOSPHORUS: 3.9 MG/DL (ref 2.5–4.9)
POTASSIUM SERPL-SCNC: 4.8 MMOL/L (ref 3.5–5.1)
PROT/CREAT RATIO, UR: 0.1
PROTEIN UA: NEGATIVE MG/DL
RBC URINE: 2 /HPF (ref 0–3)
SODIUM BLD-SCNC: 142 MMOL/L (ref 135–145)
SPECIFIC GRAVITY UA: 1.01 (ref 1–1.03)
SQUAMOUS EPITHELIAL: <1 /HPF
TRICHOMONAS: ABNORMAL /HPF
URINE TOTAL PROTEIN: 8.5 MG/DL
UROBILINOGEN, URINE: 0.2 MG/DL (ref 0.2–1)
WBC UA: 7 /HPF (ref 0–2)

## 2023-04-03 PROCEDURE — 82570 ASSAY OF URINE CREATININE: CPT

## 2023-04-03 PROCEDURE — 81001 URINALYSIS AUTO W/SCOPE: CPT

## 2023-04-03 PROCEDURE — 84156 ASSAY OF PROTEIN URINE: CPT

## 2023-04-03 PROCEDURE — 83735 ASSAY OF MAGNESIUM: CPT

## 2023-04-03 PROCEDURE — 84100 ASSAY OF PHOSPHORUS: CPT

## 2023-04-03 PROCEDURE — 36415 COLL VENOUS BLD VENIPUNCTURE: CPT

## 2023-04-03 PROCEDURE — 82040 ASSAY OF SERUM ALBUMIN: CPT

## 2023-04-03 PROCEDURE — 80048 BASIC METABOLIC PNL TOTAL CA: CPT

## 2023-04-14 PROBLEM — N18.4 CHRONIC KIDNEY DISEASE, STAGE IV (SEVERE) (HCC): Status: ACTIVE | Noted: 2023-04-14

## 2023-06-24 PROCEDURE — 93296 REM INTERROG EVL PM/IDS: CPT | Performed by: INTERNAL MEDICINE

## 2023-06-24 PROCEDURE — 93294 REM INTERROG EVL PM/LDLS PM: CPT | Performed by: INTERNAL MEDICINE

## 2023-06-26 ENCOUNTER — PROCEDURE VISIT (OUTPATIENT)
Dept: CARDIOLOGY CLINIC | Age: 88
End: 2023-06-26
Payer: MEDICARE

## 2023-06-26 ENCOUNTER — TELEPHONE (OUTPATIENT)
Dept: CARDIOLOGY CLINIC | Age: 88
End: 2023-06-26

## 2023-06-26 DIAGNOSIS — I49.5 SINUS NODE DYSFUNCTION (HCC): ICD-10-CM

## 2023-06-26 DIAGNOSIS — Z95.0 CARDIAC PACEMAKER IN SITU: Primary | ICD-10-CM

## 2023-06-26 DIAGNOSIS — I44.30 AV BLOCK: ICD-10-CM

## 2023-09-29 ENCOUNTER — HOSPITAL ENCOUNTER (OUTPATIENT)
Age: 88
Setting detail: SPECIMEN
Discharge: HOME | End: 2023-09-29
Payer: MEDICARE

## 2023-09-29 LAB
ALBUMIN SERPL-MCNC: 4 GM/DL (ref 3.4–5)
ANION GAP SERPL CALCULATED.3IONS-SCNC: 15 MMOL/L (ref 4–16)
BUN SERPL-MCNC: 52 MG/DL (ref 6–23)
CALCIUM SERPL-MCNC: 9 MG/DL (ref 8.3–10.6)
CHLORIDE BLD-SCNC: 101 MMOL/L (ref 99–110)
CO2: 24 MMOL/L (ref 21–32)
CREAT SERPL-MCNC: 2.4 MG/DL (ref 0.9–1.3)
GFR SERPL CREATININE-BSD FRML MDRD: 24 ML/MIN/1.73M2
GLUCOSE SERPL-MCNC: 146 MG/DL (ref 70–99)
MAGNESIUM: 2.7 MG/DL (ref 1.8–2.4)
PHOSPHORUS: 4.4 MG/DL (ref 2.5–4.9)
POTASSIUM SERPL-SCNC: 4.5 MMOL/L (ref 3.5–5.1)
SODIUM BLD-SCNC: 140 MMOL/L (ref 135–145)

## 2023-09-29 PROCEDURE — 84100 ASSAY OF PHOSPHORUS: CPT

## 2023-09-29 PROCEDURE — 36415 COLL VENOUS BLD VENIPUNCTURE: CPT

## 2023-09-29 PROCEDURE — 83735 ASSAY OF MAGNESIUM: CPT

## 2023-09-29 PROCEDURE — 80048 BASIC METABOLIC PNL TOTAL CA: CPT

## 2023-09-29 PROCEDURE — 82040 ASSAY OF SERUM ALBUMIN: CPT

## 2023-09-30 PROCEDURE — 93294 REM INTERROG EVL PM/LDLS PM: CPT | Performed by: INTERNAL MEDICINE

## 2023-09-30 PROCEDURE — 93296 REM INTERROG EVL PM/IDS: CPT | Performed by: INTERNAL MEDICINE

## 2023-10-02 ENCOUNTER — PROCEDURE VISIT (OUTPATIENT)
Dept: CARDIOLOGY CLINIC | Age: 88
End: 2023-10-02
Payer: MEDICARE

## 2023-10-02 DIAGNOSIS — I49.5 SINUS NODE DYSFUNCTION (HCC): ICD-10-CM

## 2023-10-02 DIAGNOSIS — Z95.0 CARDIAC PACEMAKER IN SITU: Primary | ICD-10-CM

## 2023-10-02 DIAGNOSIS — I44.30 AV BLOCK: ICD-10-CM

## 2023-10-03 ENCOUNTER — HOSPITAL ENCOUNTER (OUTPATIENT)
Age: 88
Setting detail: SPECIMEN
Discharge: HOME | End: 2023-10-03
Payer: MEDICARE

## 2023-10-03 LAB
BACTERIA: NEGATIVE /HPF
BILIRUBIN URINE: NEGATIVE MG/DL
BLOOD, URINE: ABNORMAL
CLARITY: CLEAR
COLOR: YELLOW
GLUCOSE, URINE: NEGATIVE MG/DL
KETONES, URINE: NEGATIVE MG/DL
LEUKOCYTE ESTERASE, URINE: ABNORMAL
MUCUS: ABNORMAL HPF
NITRITE URINE, QUANTITATIVE: NEGATIVE
PH, URINE: 6 (ref 5–8)
PROTEIN UA: NEGATIVE MG/DL
RBC URINE: 1 /HPF (ref 0–3)
SPECIFIC GRAVITY UA: 1.01 (ref 1–1.03)
SQUAMOUS EPITHELIAL: 1 /HPF
TRICHOMONAS: ABNORMAL /HPF
UROBILINOGEN, URINE: 0.2 MG/DL (ref 0.2–1)
WBC UA: 31 /HPF (ref 0–2)

## 2023-10-03 PROCEDURE — 82570 ASSAY OF URINE CREATININE: CPT

## 2023-10-03 PROCEDURE — 84156 ASSAY OF PROTEIN URINE: CPT

## 2023-10-03 PROCEDURE — 81001 URINALYSIS AUTO W/SCOPE: CPT

## 2023-10-04 LAB
CREATININE URINE: 89.5 MG/DL (ref 39–259)
PROT/CREAT RATIO, UR: 0.1
URINE TOTAL PROTEIN: 9.8 MG/DL

## 2023-11-21 ENCOUNTER — OFFICE VISIT (OUTPATIENT)
Dept: CARDIOLOGY CLINIC | Age: 88
End: 2023-11-21

## 2023-11-21 VITALS
SYSTOLIC BLOOD PRESSURE: 126 MMHG | WEIGHT: 192.2 LBS | DIASTOLIC BLOOD PRESSURE: 78 MMHG | HEART RATE: 64 BPM | HEIGHT: 68 IN | BODY MASS INDEX: 29.13 KG/M2

## 2023-11-21 DIAGNOSIS — E78.5 DYSLIPIDEMIA: ICD-10-CM

## 2023-11-21 DIAGNOSIS — I25.118 CORONARY ARTERY DISEASE OF NATIVE ARTERY OF NATIVE HEART WITH STABLE ANGINA PECTORIS (HCC): ICD-10-CM

## 2023-11-21 DIAGNOSIS — I10 ESSENTIAL HYPERTENSION: ICD-10-CM

## 2023-11-21 DIAGNOSIS — I44.30 AV BLOCK: ICD-10-CM

## 2023-11-21 DIAGNOSIS — I48.0 PAF (PAROXYSMAL ATRIAL FIBRILLATION) (HCC): ICD-10-CM

## 2023-11-21 DIAGNOSIS — Z95.0 PACEMAKER: ICD-10-CM

## 2023-11-21 DIAGNOSIS — I65.23 BILATERAL CAROTID ARTERY STENOSIS: ICD-10-CM

## 2023-11-21 DIAGNOSIS — Z95.0 STATUS POST PLACEMENT OF CARDIAC PACEMAKER: ICD-10-CM

## 2023-11-21 DIAGNOSIS — R07.9 CHEST PAIN, UNSPECIFIED TYPE: ICD-10-CM

## 2023-11-21 DIAGNOSIS — Z95.0 CARDIAC PACEMAKER IN SITU: Primary | ICD-10-CM

## 2023-11-21 DIAGNOSIS — I35.0 NONRHEUMATIC AORTIC VALVE STENOSIS: ICD-10-CM

## 2023-11-21 DIAGNOSIS — I49.5 SINUS NODE DYSFUNCTION (HCC): ICD-10-CM

## 2023-11-21 NOTE — PROGRESS NOTES
abnormalities. Psychiatric: normal mood and affect    No results found for: \"CKTOTAL\", \"CKMB\", \"CKMBINDEX\", \"TROPONINI\"  BNP:  No results found for: \"BNP\"  PT/INR:  No results found for: \"PTINR\"  No results found for: \"LABA1C\"  Lab Results   Component Value Date    CHOL 181 12/09/2021    TRIG 181 12/09/2021    HDL 54 12/09/2021    LDLCALC 91 12/09/2021     Lab Results   Component Value Date    ALT 13 12/09/2021    AST 23 12/09/2021     TSH:  No results found for: \"TSH\"    Impression:  Che Parisi is a 80 y. o.year old who  has a past medical history of Arthritis, CAD (coronary artery disease), H/O Doppler carotid ultrasound, H/O echocardiogram, Hx of Doppler echocardiogram, Hyperlipidemia, Hypertension, and Spinal stenosis. and presents with     Plan:  CAD and chest pain and shortness of breath: only once in a while, he feels better with slow pace walking,, was made to have brisk walk in the office corridor again, and did not have any chest pain, last time he had NTG 4 days ago, he usually feel chest pain when he [employer] his car and walks to dinner with very fast pace in cold air,AS PER PATIENT he had 16 times LHC ( LAST heart cath in 2014 ) , he is 89 yrs old,he can  Still walk 75 yards with mild shortness of breath and chest pain when its extremely cold weather, he is DNR,echo showed moderate AS on  echo,continue aspirin and zetia, discussed in detail about DNR and conservative therapy. continue imdur, hold off on ranexa echo showed normal lvef moderate AS, HE IS OK FOR FISHING  PAF: patient is high risk for fall, CHADs vasc score is 3, he used to be on coumadin ad it was stopped, will continue apsirin.   PPM: battery life is less than 3 months, follow up every month, referred  to EP, he has appointment on April 9th, will follow up,WILL CHECK [address]  echo showed moderate AS in 2020, rechecked echo, his still has moderate AS,, PATRIZIA is 1.0, with mean gradient of 29mmHg, WILL HOLD OFF ON LHR/RHC

## 2023-11-23 ENCOUNTER — APPOINTMENT (OUTPATIENT)
Dept: CT IMAGING | Age: 88
End: 2023-11-23
Payer: MEDICARE

## 2023-11-23 ENCOUNTER — APPOINTMENT (OUTPATIENT)
Dept: GENERAL RADIOLOGY | Age: 88
End: 2023-11-23
Attending: EMERGENCY MEDICINE
Payer: MEDICARE

## 2023-11-23 ENCOUNTER — APPOINTMENT (OUTPATIENT)
Dept: GENERAL RADIOLOGY | Age: 88
End: 2023-11-23
Payer: MEDICARE

## 2023-11-23 ENCOUNTER — HOSPITAL ENCOUNTER (INPATIENT)
Age: 88
LOS: 8 days | Discharge: OTHER INSTITUTION | End: 2023-12-01
Attending: INTERNAL MEDICINE | Admitting: INTERNAL MEDICINE
Payer: MEDICARE

## 2023-11-23 DIAGNOSIS — S09.90XA CLOSED HEAD INJURY, INITIAL ENCOUNTER: ICD-10-CM

## 2023-11-23 DIAGNOSIS — I25.118 CORONARY ARTERY DISEASE OF NATIVE ARTERY OF NATIVE HEART WITH STABLE ANGINA PECTORIS (HCC): ICD-10-CM

## 2023-11-23 DIAGNOSIS — I48.0 PAF (PAROXYSMAL ATRIAL FIBRILLATION) (HCC): ICD-10-CM

## 2023-11-23 DIAGNOSIS — I65.23 BILATERAL CAROTID ARTERY STENOSIS: ICD-10-CM
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
## 2023-11-23 DIAGNOSIS — S72.001A CLOSED FRACTURE OF NECK OF RIGHT FEMUR, INITIAL ENCOUNTER (HCC): ICD-10-CM

## 2023-11-23 DIAGNOSIS — Z95.0 PACEMAKER: ICD-10-CM

## 2023-11-23 DIAGNOSIS — I10 ESSENTIAL HYPERTENSION: ICD-10-CM

## 2023-11-23 DIAGNOSIS — S81.819A LACERATION OF SHIN: ICD-10-CM

## 2023-11-23 DIAGNOSIS — M79.89 SWELLING OF EXTREMITY, LEFT: ICD-10-CM

## 2023-11-23 PROBLEM — S72.141A INTERTROCHANTERIC FRACTURE OF RIGHT FEMUR, CLOSED, INITIAL ENCOUNTER (HCC): Status: ACTIVE | Noted: 2023-11-23

## 2023-11-23 LAB
ALBUMIN SERPL-MCNC: 4.1 GM/DL (ref 3.4–5)
ALP BLD-CCNC: 82 IU/L (ref 40–129)
ALT SERPL-CCNC: 18 U/L (ref 10–40)
ANION GAP SERPL CALCULATED.3IONS-SCNC: 12 MMOL/L (ref 4–16)
APTT: 31.3 SECONDS (ref 25.1–37.1)
AST SERPL-CCNC: 27 IU/L (ref 15–37)
BACTERIA: NEGATIVE /HPF
BASOPHILS ABSOLUTE: 0.1 K/CU MM
BASOPHILS RELATIVE PERCENT: 0.6 % (ref 0–1)
BILIRUB SERPL-MCNC: 0.6 MG/DL (ref 0–1)
BILIRUBIN URINE: NEGATIVE MG/DL
BLOOD, URINE: NEGATIVE
BUN SERPL-MCNC: 41 MG/DL (ref 6–23)
CALCIUM SERPL-MCNC: 8.7 MG/DL (ref 8.3–10.6)
CHLORIDE BLD-SCNC: 102 MMOL/L (ref 99–110)
CLARITY: CLEAR
CO2: 27 MMOL/L (ref 21–32)
COLOR: YELLOW
CREAT SERPL-MCNC: 2.3 MG/DL (ref 0.9–1.3)
DIFFERENTIAL TYPE: ABNORMAL
EOSINOPHILS ABSOLUTE: 0.2 K/CU MM
EOSINOPHILS RELATIVE PERCENT: 1.6 % (ref 0–3)
GFR SERPL CREATININE-BSD FRML MDRD: 25 ML/MIN/1.73M2
GLUCOSE SERPL-MCNC: 140 MG/DL (ref 70–99)
GLUCOSE, URINE: NEGATIVE MG/DL
HCT VFR BLD CALC: 37.1 % (ref 42–52)
HEMOGLOBIN: 11.3 GM/DL (ref 13.5–18)
HYALINE CASTS: 0 /LPF
IMMATURE NEUTROPHIL %: 0.6 % (ref 0–0.43)
INR BLD: 1 INDEX
KETONES, URINE: NEGATIVE MG/DL
LEUKOCYTE ESTERASE, URINE: ABNORMAL
LYMPHOCYTES ABSOLUTE: 1 K/CU MM
LYMPHOCYTES RELATIVE PERCENT: 10.3 % (ref 24–44)
MCH RBC QN AUTO: 28.8 PG (ref 27–31)
MCHC RBC AUTO-ENTMCNC: 30.5 % (ref 32–36)
MCV RBC AUTO: 94.6 FL (ref 78–100)
MONOCYTES ABSOLUTE: 0.5 K/CU MM
MONOCYTES RELATIVE PERCENT: 5.4 % (ref 0–4)
MUCUS: ABNORMAL HPF
NITRITE URINE, QUANTITATIVE: NEGATIVE
NUCLEATED RBC %: 0 %
PDW BLD-RTO: 15.2 % (ref 11.7–14.9)
PH, URINE: 6 (ref 5–8)
PLATELET # BLD: 204 K/CU MM (ref 140–440)
PMV BLD AUTO: 9.5 FL (ref 7.5–11.1)
POTASSIUM SERPL-SCNC: 4.3 MMOL/L (ref 3.5–5.1)
PROTEIN UA: NEGATIVE MG/DL
PROTHROMBIN TIME: 13.7 SECONDS (ref 11.7–14.5)
RBC # BLD: 3.92 M/CU MM (ref 4.6–6.2)
RBC URINE: 1 /HPF (ref 0–3)
SEGMENTED NEUTROPHILS ABSOLUTE COUNT: 7.9 K/CU MM
SEGMENTED NEUTROPHILS RELATIVE PERCENT: 81.5 % (ref 36–66)
SODIUM BLD-SCNC: 141 MMOL/L (ref 135–145)
SPECIFIC GRAVITY UA: 1.01 (ref 1–1.03)
SQUAMOUS EPITHELIAL: <1 /HPF
TOTAL IMMATURE NEUTOROPHIL: 0.06 K/CU MM
TOTAL NUCLEATED RBC: 0 K/CU MM
TOTAL PROTEIN: 7.2 GM/DL (ref 6.4–8.2)
TRANSITIONAL EPITHELIAL: <1 /HPF
TRICHOMONAS: ABNORMAL /HPF
UROBILINOGEN, URINE: 0.2 MG/DL (ref 0.2–1)
WBC # BLD: 9.8 K/CU MM (ref 4–10.5)
WBC CLUMP: ABNORMAL /HPF
WBC UA: 5 /HPF (ref 0–2)

## 2023-11-23 PROCEDURE — 73502 X-RAY EXAM HIP UNI 2-3 VIEWS: CPT

## 2023-11-23 PROCEDURE — 2500000003 HC RX 250 WO HCPCS: Performed by: PHYSICIAN ASSISTANT

## 2023-11-23 PROCEDURE — 85730 THROMBOPLASTIN TIME PARTIAL: CPT

## 2023-11-23 PROCEDURE — 1200000000 HC SEMI PRIVATE

## 2023-11-23 PROCEDURE — 2580000003 HC RX 258: Performed by: PHYSICIAN ASSISTANT

## 2023-11-23 PROCEDURE — 99285 EMERGENCY DEPT VISIT HI MDM: CPT

## 2023-11-23 PROCEDURE — 96374 THER/PROPH/DIAG INJ IV PUSH: CPT

## 2023-11-23 PROCEDURE — 81001 URINALYSIS AUTO W/SCOPE: CPT

## 2023-11-23 PROCEDURE — 73552 X-RAY EXAM OF FEMUR 2/>: CPT

## 2023-11-23 PROCEDURE — 70450 CT HEAD/BRAIN W/O DYE: CPT

## 2023-11-23 PROCEDURE — 85610 PROTHROMBIN TIME: CPT

## 2023-11-23 PROCEDURE — 73590 X-RAY EXAM OF LOWER LEG: CPT

## 2023-11-23 PROCEDURE — 80053 COMPREHEN METABOLIC PANEL: CPT

## 2023-11-23 PROCEDURE — 2580000003 HC RX 258: Performed by: INTERNAL MEDICINE

## 2023-11-23 PROCEDURE — 71045 X-RAY EXAM CHEST 1 VIEW: CPT

## 2023-11-23 PROCEDURE — 90471 IMMUNIZATION ADMIN: CPT | Performed by: PHYSICIAN ASSISTANT

## 2023-11-23 PROCEDURE — 93005 ELECTROCARDIOGRAM TRACING: CPT | Performed by: PHYSICIAN ASSISTANT

## 2023-11-23 PROCEDURE — 90715 TDAP VACCINE 7 YRS/> IM: CPT | Performed by: PHYSICIAN ASSISTANT

## 2023-11-23 PROCEDURE — 6360000002 HC RX W HCPCS: Performed by: PHYSICIAN ASSISTANT

## 2023-11-23 PROCEDURE — 96375 TX/PRO/DX INJ NEW DRUG ADDON: CPT

## 2023-11-23 PROCEDURE — 85025 COMPLETE CBC W/AUTO DIFF WBC: CPT

## 2023-11-23 PROCEDURE — 72125 CT NECK SPINE W/O DYE: CPT

## 2023-11-23 PROCEDURE — 96376 TX/PRO/DX INJ SAME DRUG ADON: CPT

## 2023-11-23 PROCEDURE — 12004 RPR S/N/AX/GEN/TRK7.6-12.5CM: CPT

## 2023-11-23 RX ORDER — ENOXAPARIN SODIUM 100 MG/ML
30 INJECTION SUBCUTANEOUS DAILY
Status: DISCONTINUED | OUTPATIENT
Start: 2023-11-24 | End: 2023-11-24

## 2023-11-23 RX ORDER — ROPINIROLE 0.25 MG/1
0.75 TABLET, FILM COATED ORAL NIGHTLY
Status: DISCONTINUED | OUTPATIENT
Start: 2023-11-23 | End: 2023-12-01 | Stop reason: HOSPADM

## 2023-11-23 RX ORDER — 0.9 % SODIUM CHLORIDE 0.9 %
500 INTRAVENOUS SOLUTION INTRAVENOUS ONCE
Status: COMPLETED | OUTPATIENT
Start: 2023-11-23 | End: 2023-11-23

## 2023-11-23 RX ORDER — ACETAMINOPHEN 325 MG/1
650 TABLET ORAL EVERY 6 HOURS PRN
Status: DISCONTINUED | OUTPATIENT
Start: 2023-11-23 | End: 2023-12-01 | Stop reason: HOSPADM

## 2023-11-23 RX ORDER — SODIUM CHLORIDE 9 MG/ML
INJECTION, SOLUTION INTRAVENOUS CONTINUOUS
Status: DISCONTINUED | OUTPATIENT
Start: 2023-11-24 | End: 2023-11-24

## 2023-11-23 RX ORDER — ONDANSETRON 4 MG/1
4 TABLET, ORALLY DISINTEGRATING ORAL EVERY 8 HOURS PRN
Status: DISCONTINUED | OUTPATIENT
Start: 2023-11-23 | End: 2023-12-01 | Stop reason: HOSPADM

## 2023-11-23 RX ORDER — ASPIRIN 81 MG/1
81 TABLET, CHEWABLE ORAL DAILY
Status: DISCONTINUED | OUTPATIENT
Start: 2023-11-24 | End: 2023-12-01 | Stop reason: HOSPADM

## 2023-11-23 RX ORDER — TRAZODONE HYDROCHLORIDE 50 MG/1
50 TABLET ORAL NIGHTLY PRN
Status: DISCONTINUED | OUTPATIENT
Start: 2023-11-23 | End: 2023-12-01 | Stop reason: HOSPADM

## 2023-11-23 RX ORDER — MORPHINE SULFATE 2 MG/ML
1 INJECTION, SOLUTION INTRAMUSCULAR; INTRAVENOUS ONCE
Status: DISCONTINUED | OUTPATIENT
Start: 2023-11-24 | End: 2023-11-26

## 2023-11-23 RX ORDER — SODIUM CHLORIDE 0.9 % (FLUSH) 0.9 %
5-40 SYRINGE (ML) INJECTION EVERY 12 HOURS SCHEDULED
Status: DISCONTINUED | OUTPATIENT
Start: 2023-11-23 | End: 2023-12-01 | Stop reason: HOSPADM

## 2023-11-23 RX ORDER — METOPROLOL SUCCINATE 25 MG/1
12.5 TABLET, EXTENDED RELEASE ORAL DAILY
Status: DISCONTINUED | OUTPATIENT
Start: 2023-11-24 | End: 2023-12-01 | Stop reason: HOSPADM

## 2023-11-23 RX ORDER — MORPHINE SULFATE 4 MG/ML
4 INJECTION, SOLUTION INTRAMUSCULAR; INTRAVENOUS EVERY 30 MIN PRN
Status: DISCONTINUED | OUTPATIENT
Start: 2023-11-23 | End: 2023-11-23 | Stop reason: ALTCHOICE

## 2023-11-23 RX ORDER — FENTANYL CITRATE 50 UG/ML
50 INJECTION, SOLUTION INTRAMUSCULAR; INTRAVENOUS ONCE
Status: COMPLETED | OUTPATIENT
Start: 2023-11-23 | End: 2023-11-23

## 2023-11-23 RX ORDER — FUROSEMIDE 40 MG/1
40 TABLET ORAL DAILY
Status: DISCONTINUED | OUTPATIENT
Start: 2023-11-24 | End: 2023-12-01 | Stop reason: HOSPADM

## 2023-11-23 RX ORDER — OXYCODONE HYDROCHLORIDE AND ACETAMINOPHEN 5; 325 MG/1; MG/1
1 TABLET ORAL EVERY 8 HOURS PRN
Status: DISCONTINUED | OUTPATIENT
Start: 2023-11-23 | End: 2023-11-27

## 2023-11-23 RX ORDER — MORPHINE SULFATE 4 MG/ML
4 INJECTION, SOLUTION INTRAMUSCULAR; INTRAVENOUS ONCE
Status: COMPLETED | OUTPATIENT
Start: 2023-11-23 | End: 2023-11-23

## 2023-11-23 RX ORDER — EZETIMIBE 10 MG/1
10 TABLET ORAL NIGHTLY
Status: DISCONTINUED | OUTPATIENT
Start: 2023-11-23 | End: 2023-12-01 | Stop reason: HOSPADM

## 2023-11-23 RX ORDER — BISACODYL 5 MG/1
5 TABLET, DELAYED RELEASE ORAL DAILY PRN
Status: DISCONTINUED | OUTPATIENT
Start: 2023-11-23 | End: 2023-12-01 | Stop reason: HOSPADM

## 2023-11-23 RX ORDER — DOCUSATE SODIUM 100 MG/1
100 CAPSULE, LIQUID FILLED ORAL 2 TIMES DAILY PRN
Status: DISCONTINUED | OUTPATIENT
Start: 2023-11-23 | End: 2023-12-01 | Stop reason: HOSPADM

## 2023-11-23 RX ORDER — SODIUM CHLORIDE 0.9 % (FLUSH) 0.9 %
5-40 SYRINGE (ML) INJECTION PRN
Status: DISCONTINUED | OUTPATIENT
Start: 2023-11-23 | End: 2023-12-01 | Stop reason: HOSPADM

## 2023-11-23 RX ORDER — ACETAMINOPHEN 650 MG/1
650 SUPPOSITORY RECTAL EVERY 6 HOURS PRN
Status: DISCONTINUED | OUTPATIENT
Start: 2023-11-23 | End: 2023-12-01 | Stop reason: HOSPADM

## 2023-11-23 RX ORDER — SODIUM CHLORIDE 9 MG/ML
INJECTION, SOLUTION INTRAVENOUS PRN
Status: DISCONTINUED | OUTPATIENT
Start: 2023-11-23 | End: 2023-12-01 | Stop reason: HOSPADM

## 2023-11-23 RX ORDER — POLYETHYLENE GLYCOL 3350 17 G/17G
17 POWDER, FOR SOLUTION ORAL DAILY PRN
Status: DISCONTINUED | OUTPATIENT
Start: 2023-11-23 | End: 2023-12-01 | Stop reason: HOSPADM

## 2023-11-23 RX ORDER — ONDANSETRON 2 MG/ML
4 INJECTION INTRAMUSCULAR; INTRAVENOUS EVERY 6 HOURS PRN
Status: DISCONTINUED | OUTPATIENT
Start: 2023-11-23 | End: 2023-12-01 | Stop reason: HOSPADM

## 2023-11-23 RX ORDER — LIDOCAINE HYDROCHLORIDE AND EPINEPHRINE BITARTRATE 20; .01 MG/ML; MG/ML
20 INJECTION, SOLUTION SUBCUTANEOUS ONCE
Status: COMPLETED | OUTPATIENT
Start: 2023-11-23 | End: 2023-11-23

## 2023-11-23 RX ORDER — ONDANSETRON 2 MG/ML
4 INJECTION INTRAMUSCULAR; INTRAVENOUS EVERY 30 MIN PRN
Status: DISCONTINUED | OUTPATIENT
Start: 2023-11-23 | End: 2023-11-23 | Stop reason: SDUPTHER

## 2023-11-23 RX ADMIN — LIDOCAINE HYDROCHLORIDE,EPINEPHRINE BITARTRATE 20 ML: 20; .01 INJECTION, SOLUTION INFILTRATION; PERINEURAL at 17:43

## 2023-11-23 RX ADMIN — SODIUM CHLORIDE 500 ML: 9 INJECTION, SOLUTION INTRAVENOUS at 17:51

## 2023-11-23 RX ADMIN — MORPHINE SULFATE 4 MG: 4 INJECTION, SOLUTION INTRAMUSCULAR; INTRAVENOUS at 21:27

## 2023-11-23 RX ADMIN — MORPHINE SULFATE 4 MG: 4 INJECTION, SOLUTION INTRAMUSCULAR; INTRAVENOUS at 15:44

## 2023-11-23 RX ADMIN — ONDANSETRON 4 MG: 2 INJECTION INTRAMUSCULAR; INTRAVENOUS at 21:27

## 2023-11-23 RX ADMIN — SODIUM CHLORIDE: 9 INJECTION, SOLUTION INTRAVENOUS at 23:52

## 2023-11-23 RX ADMIN — TETANUS TOXOID, REDUCED DIPHTHERIA TOXOID AND ACELLULAR PERTUSSIS VACCINE, ADSORBED 0.5 ML: 5; 2.5; 8; 8; 2.5 SUSPENSION INTRAMUSCULAR at 17:40

## 2023-11-23 RX ADMIN — FENTANYL CITRATE 50 MCG: 50 INJECTION INTRAMUSCULAR; INTRAVENOUS at 17:41

## 2023-11-23 ASSESSMENT — PAIN SCALES - GENERAL
PAINLEVEL_OUTOF10: 10
PAINLEVEL_OUTOF10: 6
PAINLEVEL_OUTOF10: 8

## 2023-11-23 ASSESSMENT — PAIN DESCRIPTION - LOCATION
LOCATION: HIP

## 2023-11-23 ASSESSMENT — PAIN DESCRIPTION - DESCRIPTORS
DESCRIPTORS: ACHING

## 2023-11-23 ASSESSMENT — PAIN - FUNCTIONAL ASSESSMENT: PAIN_FUNCTIONAL_ASSESSMENT: PREVENTS OR INTERFERES WITH ALL ACTIVE AND SOME PASSIVE ACTIVITIES

## 2023-11-23 ASSESSMENT — PAIN DESCRIPTION - ORIENTATION
ORIENTATION: RIGHT

## 2023-11-23 ASSESSMENT — PAIN DESCRIPTION - PAIN TYPE: TYPE: ACUTE PAIN

## 2023-11-23 ASSESSMENT — PAIN DESCRIPTION - ONSET: ONSET: ON-GOING

## 2023-11-23 ASSESSMENT — PAIN DESCRIPTION - FREQUENCY: FREQUENCY: INTERMITTENT
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
## 2023-11-23 NOTE — ED NOTES
[address] [place] [address] Ne paged Dr Jacki Hooper  11/23/23 [phone] Dr Fredrick Hurst returned call      Jeanna Carlos  11/23/23
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
## 2023-11-24 ENCOUNTER — APPOINTMENT (OUTPATIENT)
Dept: NON INVASIVE DIAGNOSTICS | Age: 88
End: 2023-11-24
Payer: MEDICARE

## 2023-11-24 ENCOUNTER — ANESTHESIA EVENT (OUTPATIENT)
Dept: OPERATING ROOM | Age: 88
End: 2023-11-24
Payer: MEDICARE

## 2023-11-24 ENCOUNTER — APPOINTMENT (OUTPATIENT)
Dept: GENERAL RADIOLOGY | Age: 88
End: 2023-11-24
Payer: MEDICARE

## 2023-11-24 ENCOUNTER — ANESTHESIA (OUTPATIENT)
Dept: OPERATING ROOM | Age: 88
End: 2023-11-24
Payer: MEDICARE
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
## 2023-11-24 LAB
ANION GAP SERPL CALCULATED.3IONS-SCNC: 10 MMOL/L (ref 4–16)
BASOPHILS ABSOLUTE: 0 K/CU MM
BASOPHILS RELATIVE PERCENT: 0.4 % (ref 0–1)
BUN SERPL-MCNC: 38 MG/DL (ref 6–23)
CALCIUM SERPL-MCNC: 8.5 MG/DL (ref 8.3–10.6)
CHLORIDE BLD-SCNC: 104 MMOL/L (ref 99–110)
CO2: 27 MMOL/L (ref 21–32)
CREAT SERPL-MCNC: 2.1 MG/DL (ref 0.9–1.3)
DIFFERENTIAL TYPE: ABNORMAL
ECHO AO ROOT DIAM: 3.4 CM
ECHO AO ROOT INDEX: 1.69 CM/M2
ECHO AR MAX VEL PISA: 4 M/S
ECHO AV AREA PEAK VELOCITY: 0.8 CM2
ECHO AV AREA VTI: 0.8 CM2
ECHO AV AREA/BSA PEAK VELOCITY: 0.4 CM2/M2
ECHO AV AREA/BSA VTI: 0.4 CM2/M2
ECHO AV MEAN GRADIENT: 42 MMHG
ECHO AV MEAN VELOCITY: 3.1 M/S
ECHO AV PEAK GRADIENT: 67 MMHG
ECHO AV PEAK VELOCITY: 4.1 M/S
ECHO AV REGURGITANT PHT: 533 MS
ECHO AV VELOCITY RATIO: 0.22
ECHO AV VTI: 89.4 CM
ECHO BSA: 2.04 M2
ECHO LA AREA 4C: 18.2 CM2
ECHO LA DIAMETER INDEX: 2.29 CM/M2
ECHO LA DIAMETER: 4.6 CM
ECHO LA MAJOR AXIS: 5.3 CM
ECHO LA TO AORTIC ROOT RATIO: 1.35
ECHO LA VOL MOD A4C: 53 ML (ref 18–58)
ECHO LA VOLUME INDEX MOD A4C: 26 ML/M2 (ref 16–34)
ECHO LV E' LATERAL VELOCITY: 6 CM/S
ECHO LV E' SEPTAL VELOCITY: 5 CM/S
ECHO LV EDV A4C: 113 ML
ECHO LV EDV INDEX A4C: 56 ML/M2
ECHO LV EJECTION FRACTION A4C: 62 %
ECHO LV ESV A4C: 43 ML
ECHO LV ESV INDEX A4C: 21 ML/M2
ECHO LV FRACTIONAL SHORTENING: 40 % (ref 28–44)
ECHO LV INTERNAL DIMENSION DIASTOLE INDEX: 2.39 CM/M2
ECHO LV INTERNAL DIMENSION DIASTOLIC: 4.8 CM (ref 4.2–5.9)
ECHO LV INTERNAL DIMENSION SYSTOLIC INDEX: 1.44 CM/M2
ECHO LV INTERNAL DIMENSION SYSTOLIC: 2.9 CM
ECHO LV IVSD: 1.5 CM (ref 0.6–1)
ECHO LV MASS 2D: 334.6 G (ref 88–224)
ECHO LV MASS INDEX 2D: 166.5 G/M2 (ref 49–115)
ECHO LV POSTERIOR WALL DIASTOLIC: 1.7 CM (ref 0.6–1)
ECHO LV RELATIVE WALL THICKNESS RATIO: 0.71
ECHO LVOT AREA: 3.8 CM2
ECHO LVOT AV VTI INDEX: 0.2
ECHO LVOT DIAM: 2.2 CM
ECHO LVOT MEAN GRADIENT: 2 MMHG
ECHO LVOT PEAK GRADIENT: 3 MMHG
ECHO LVOT PEAK VELOCITY: 0.9 M/S
ECHO LVOT STROKE VOLUME INDEX: 34.6 ML/M2
ECHO LVOT SV: 69.5 ML
ECHO LVOT VTI: 18.3 CM
ECHO MV A VELOCITY: 0.9 M/S
ECHO MV E VELOCITY: 0.9 M/S
ECHO MV E/A RATIO: 1
ECHO MV E/E' LATERAL: 15
ECHO MV E/E' RATIO (AVERAGED): 16.5
EOSINOPHILS ABSOLUTE: 0 K/CU MM
EOSINOPHILS RELATIVE PERCENT: 0.4 % (ref 0–3)
GFR SERPL CREATININE-BSD FRML MDRD: 28 ML/MIN/1.73M2
GLUCOSE SERPL-MCNC: 161 MG/DL (ref 70–99)
HCT VFR BLD CALC: 35.1 % (ref 42–52)
HEMOGLOBIN: 10.3 GM/DL (ref 13.5–18)
IMMATURE NEUTROPHIL %: 0.6 % (ref 0–0.43)
LYMPHOCYTES ABSOLUTE: 1.1 K/CU MM
LYMPHOCYTES RELATIVE PERCENT: 9.8 % (ref 24–44)
MCH RBC QN AUTO: 28.6 PG (ref 27–31)
MCHC RBC AUTO-ENTMCNC: 29.3 % (ref 32–36)
MCV RBC AUTO: 97.5 FL (ref 78–100)
MONOCYTES ABSOLUTE: 0.8 K/CU MM
MONOCYTES RELATIVE PERCENT: 7.4 % (ref 0–4)
NUCLEATED RBC %: 0 %
PDW BLD-RTO: 15.5 % (ref 11.7–14.9)
PLATELET # BLD: 175 K/CU MM (ref 140–440)
PMV BLD AUTO: 9.8 FL (ref 7.5–11.1)
POTASSIUM SERPL-SCNC: 4.4 MMOL/L (ref 3.5–5.1)
RBC # BLD: 3.6 M/CU MM (ref 4.6–6.2)
SEGMENTED NEUTROPHILS ABSOLUTE COUNT: 8.8 K/CU MM
SEGMENTED NEUTROPHILS RELATIVE PERCENT: 81.4 % (ref 36–66)
SODIUM BLD-SCNC: 141 MMOL/L (ref 135–145)
TOTAL IMMATURE NEUTOROPHIL: 0.06 K/CU MM
TOTAL NUCLEATED RBC: 0 K/CU MM
WBC # BLD: 10.8 K/CU MM (ref 4–10.5)

## 2023-11-24 PROCEDURE — 99222 1ST HOSP IP/OBS MODERATE 55: CPT | Performed by: INTERNAL MEDICINE

## 2023-11-24 PROCEDURE — 93306 TTE W/DOPPLER COMPLETE: CPT

## 2023-11-24 PROCEDURE — 6360000002 HC RX W HCPCS: Performed by: STUDENT IN AN ORGANIZED HEALTH CARE EDUCATION/TRAINING PROGRAM

## 2023-11-24 PROCEDURE — 3700000000 HC ANESTHESIA ATTENDED CARE: Performed by: ORTHOPAEDIC SURGERY

## 2023-11-24 PROCEDURE — 85025 COMPLETE CBC W/AUTO DIFF WBC: CPT

## 2023-11-24 PROCEDURE — 2580000003 HC RX 258: Performed by: INTERNAL MEDICINE

## 2023-11-24 PROCEDURE — 6370000000 HC RX 637 (ALT 250 FOR IP): Performed by: INTERNAL MEDICINE

## 2023-11-24 PROCEDURE — 7100000001 HC PACU RECOVERY - ADDTL 15 MIN: Performed by: ORTHOPAEDIC SURGERY

## 2023-11-24 PROCEDURE — 6360000002 HC RX W HCPCS: Performed by: NURSE ANESTHETIST, CERTIFIED REGISTERED

## 2023-11-24 PROCEDURE — C1713 ANCHOR/SCREW BN/BN,TIS/BN: HCPCS | Performed by: ORTHOPAEDIC SURGERY

## 2023-11-24 PROCEDURE — 6370000000 HC RX 637 (ALT 250 FOR IP): Performed by: ORTHOPAEDIC SURGERY

## 2023-11-24 PROCEDURE — 6360000002 HC RX W HCPCS: Performed by: ORTHOPAEDIC SURGERY

## 2023-11-24 PROCEDURE — 2709999900 HC NON-CHARGEABLE SUPPLY: Performed by: ORTHOPAEDIC SURGERY

## 2023-11-24 PROCEDURE — 7100000000 HC PACU RECOVERY - FIRST 15 MIN: Performed by: ORTHOPAEDIC SURGERY

## 2023-11-24 PROCEDURE — 3700000001 HC ADD 15 MINUTES (ANESTHESIA): Performed by: ORTHOPAEDIC SURGERY

## 2023-11-24 PROCEDURE — 2580000003 HC RX 258: Performed by: ORTHOPAEDIC SURGERY

## 2023-11-24 PROCEDURE — 93306 TTE W/DOPPLER COMPLETE: CPT | Performed by: INTERNAL MEDICINE

## 2023-11-24 PROCEDURE — 80048 BASIC METABOLIC PNL TOTAL CA: CPT

## 2023-11-24 PROCEDURE — 76000 FLUOROSCOPY <1 HR PHYS/QHP: CPT

## 2023-11-24 PROCEDURE — 2500000003 HC RX 250 WO HCPCS: Performed by: NURSE ANESTHETIST, CERTIFIED REGISTERED

## 2023-11-24 PROCEDURE — 36415 COLL VENOUS BLD VENIPUNCTURE: CPT

## 2023-11-24 PROCEDURE — 3600000004 HC SURGERY LEVEL 4 BASE: Performed by: ORTHOPAEDIC SURGERY

## 2023-11-24 PROCEDURE — 3600000014 HC SURGERY LEVEL 4 ADDTL 15MIN: Performed by: ORTHOPAEDIC SURGERY

## 2023-11-24 PROCEDURE — 1200000000 HC SEMI PRIVATE

## 2023-11-24 PROCEDURE — 0QS636Z REPOSITION RIGHT UPPER FEMUR WITH INTRAMEDULLARY INTERNAL FIXATION DEVICE, PERCUTANEOUS APPROACH: ICD-10-PCS | Performed by: ORTHOPAEDIC SURGERY

## 2023-11-24 DEVICE — ZNN CMN NAIL 11.5MMX36CM 125 R
Type: IMPLANTABLE DEVICE | Site: FEMUR | Status: FUNCTIONAL
Brand: ZIMMER® NATURAL NAIL® SYSTEM

## 2023-11-24 DEVICE — ZNN CMN LAG SCREW 10.5X95
Type: IMPLANTABLE DEVICE | Site: FEMUR | Status: FUNCTIONAL
Brand: ZIMMER® NATURAL NAIL® SYSTEM

## 2023-11-24 RX ORDER — SODIUM CHLORIDE 0.9 % (FLUSH) 0.9 %
5-40 SYRINGE (ML) INJECTION EVERY 12 HOURS SCHEDULED
Status: DISCONTINUED | OUTPATIENT
Start: 2023-11-24 | End: 2023-11-24 | Stop reason: HOSPADM

## 2023-11-24 RX ORDER — SODIUM CHLORIDE, SODIUM LACTATE, POTASSIUM CHLORIDE, CALCIUM CHLORIDE 600; 310; 30; 20 MG/100ML; MG/100ML; MG/100ML; MG/100ML
INJECTION, SOLUTION INTRAVENOUS CONTINUOUS
Status: DISCONTINUED | OUTPATIENT
Start: 2023-11-24 | End: 2023-11-24 | Stop reason: HOSPADM

## 2023-11-24 RX ORDER — ACETAMINOPHEN 500 MG
1000 TABLET ORAL ONCE
Status: COMPLETED | OUTPATIENT
Start: 2023-11-24 | End: 2023-11-24

## 2023-11-24 RX ORDER — SODIUM CHLORIDE, SODIUM LACTATE, POTASSIUM CHLORIDE, CALCIUM CHLORIDE 600; 310; 30; 20 MG/100ML; MG/100ML; MG/100ML; MG/100ML
INJECTION, SOLUTION INTRAVENOUS CONTINUOUS
Status: DISCONTINUED | OUTPATIENT
Start: 2023-11-24 | End: 2023-11-25

## 2023-11-24 RX ORDER — LABETALOL HYDROCHLORIDE 5 MG/ML
10 INJECTION, SOLUTION INTRAVENOUS
Status: DISCONTINUED | OUTPATIENT
Start: 2023-11-24 | End: 2023-11-24 | Stop reason: HOSPADM

## 2023-11-24 RX ORDER — SODIUM CHLORIDE 0.9 % (FLUSH) 0.9 %
5-40 SYRINGE (ML) INJECTION PRN
Status: DISCONTINUED | OUTPATIENT
Start: 2023-11-24 | End: 2023-12-01 | Stop reason: HOSPADM

## 2023-11-24 RX ORDER — ONDANSETRON 2 MG/ML
4 INJECTION INTRAMUSCULAR; INTRAVENOUS
Status: DISCONTINUED | OUTPATIENT
Start: 2023-11-24 | End: 2023-11-24 | Stop reason: HOSPADM

## 2023-11-24 RX ORDER — SODIUM CHLORIDE 9 MG/ML
INJECTION, SOLUTION INTRAVENOUS PRN
Status: DISCONTINUED | OUTPATIENT
Start: 2023-11-24 | End: 2023-11-24 | Stop reason: HOSPADM

## 2023-11-24 RX ORDER — ENOXAPARIN SODIUM 100 MG/ML
30 INJECTION SUBCUTANEOUS DAILY
Status: DISCONTINUED | OUTPATIENT
Start: 2023-11-25 | End: 2023-12-01 | Stop reason: HOSPADM

## 2023-11-24 RX ORDER — LIDOCAINE HYDROCHLORIDE 20 MG/ML
INJECTION, SOLUTION INTRAVENOUS PRN
Status: DISCONTINUED | OUTPATIENT
Start: 2023-11-24 | End: 2023-11-24 | Stop reason: SDUPTHER

## 2023-11-24 RX ORDER — SODIUM CHLORIDE 0.9 % (FLUSH) 0.9 %
5-40 SYRINGE (ML) INJECTION PRN
Status: DISCONTINUED | OUTPATIENT
Start: 2023-11-24 | End: 2023-11-24 | Stop reason: HOSPADM

## 2023-11-24 RX ORDER — ROCURONIUM BROMIDE 10 MG/ML
INJECTION, SOLUTION INTRAVENOUS PRN
Status: DISCONTINUED | OUTPATIENT
Start: 2023-11-24 | End: 2023-11-24 | Stop reason: SDUPTHER

## 2023-11-24 RX ORDER — CLINDAMYCIN PHOSPHATE 600 MG/50ML
600 INJECTION INTRAVENOUS
Status: COMPLETED | OUTPATIENT
Start: 2023-11-24 | End: 2023-11-24

## 2023-11-24 RX ORDER — FENTANYL CITRATE 50 UG/ML
25 INJECTION, SOLUTION INTRAMUSCULAR; INTRAVENOUS EVERY 5 MIN PRN
Status: DISCONTINUED | OUTPATIENT
Start: 2023-11-24 | End: 2023-11-24 | Stop reason: HOSPADM

## 2023-11-24 RX ORDER — SODIUM CHLORIDE 9 MG/ML
INJECTION, SOLUTION INTRAVENOUS PRN
Status: DISCONTINUED | OUTPATIENT
Start: 2023-11-24 | End: 2023-12-01 | Stop reason: HOSPADM

## 2023-11-24 RX ORDER — PROPOFOL 10 MG/ML
INJECTION, EMULSION INTRAVENOUS PRN
Status: DISCONTINUED | OUTPATIENT
Start: 2023-11-24 | End: 2023-11-24 | Stop reason: SDUPTHER

## 2023-11-24 RX ORDER — FENTANYL CITRATE 50 UG/ML
50 INJECTION, SOLUTION INTRAMUSCULAR; INTRAVENOUS EVERY 5 MIN PRN
Status: DISCONTINUED | OUTPATIENT
Start: 2023-11-24 | End: 2023-11-24 | Stop reason: HOSPADM

## 2023-11-24 RX ORDER — FENTANYL CITRATE 50 UG/ML
INJECTION, SOLUTION INTRAMUSCULAR; INTRAVENOUS PRN
Status: DISCONTINUED | OUTPATIENT
Start: 2023-11-24 | End: 2023-11-24 | Stop reason: SDUPTHER

## 2023-11-24 RX ORDER — ONDANSETRON 2 MG/ML
INJECTION INTRAMUSCULAR; INTRAVENOUS PRN
Status: DISCONTINUED | OUTPATIENT
Start: 2023-11-24 | End: 2023-11-24 | Stop reason: SDUPTHER

## 2023-11-24 RX ORDER — SODIUM CHLORIDE 0.9 % (FLUSH) 0.9 %
5-40 SYRINGE (ML) INJECTION EVERY 12 HOURS SCHEDULED
Status: DISCONTINUED | OUTPATIENT
Start: 2023-11-24 | End: 2023-12-01 | Stop reason: HOSPADM

## 2023-11-24 RX ORDER — PHENYLEPHRINE HCL IN 0.9% NACL 1 MG/10 ML
SYRINGE (ML) INTRAVENOUS PRN
Status: DISCONTINUED | OUTPATIENT
Start: 2023-11-24 | End: 2023-11-24 | Stop reason: SDUPTHER

## 2023-11-24 RX ORDER — HYDRALAZINE HYDROCHLORIDE 20 MG/ML
10 INJECTION INTRAMUSCULAR; INTRAVENOUS
Status: DISCONTINUED | OUTPATIENT
Start: 2023-11-24 | End: 2023-11-24 | Stop reason: HOSPADM

## 2023-11-24 RX ADMIN — TRAZODONE HYDROCHLORIDE 50 MG: 50 TABLET ORAL at 00:31

## 2023-11-24 RX ADMIN — SODIUM CHLORIDE, PRESERVATIVE FREE 10 ML: 5 INJECTION INTRAVENOUS at 12:21

## 2023-11-24 RX ADMIN — ROCURONIUM BROMIDE 50 MG: 10 INJECTION, SOLUTION INTRAVENOUS at 10:13

## 2023-11-24 RX ADMIN — OXYCODONE AND ACETAMINOPHEN 1 TABLET: 5; 325 TABLET ORAL at 14:52

## 2023-11-24 RX ADMIN — ROPINIROLE HYDROCHLORIDE 0.75 MG: 0.25 TABLET, FILM COATED ORAL at 21:37

## 2023-11-24 RX ADMIN — TRAZODONE HYDROCHLORIDE 50 MG: 50 TABLET ORAL at 23:43

## 2023-11-24 RX ADMIN — EZETIMIBE 10 MG: 10 TABLET ORAL at 21:38

## 2023-11-24 RX ADMIN — SODIUM CHLORIDE, PRESERVATIVE FREE 10 ML: 5 INJECTION INTRAVENOUS at 00:29

## 2023-11-24 RX ADMIN — SUGAMMADEX 200 MG: 100 INJECTION, SOLUTION INTRAVENOUS at 11:10

## 2023-11-24 RX ADMIN — SODIUM CHLORIDE, PRESERVATIVE FREE 10 ML: 5 INJECTION INTRAVENOUS at 17:42

## 2023-11-24 RX ADMIN — ACETAMINOPHEN 1000 MG: 500 TABLET ORAL at 09:09

## 2023-11-24 RX ADMIN — SODIUM CHLORIDE, PRESERVATIVE FREE 10 ML: 5 INJECTION INTRAVENOUS at 21:46

## 2023-11-24 RX ADMIN — ONDANSETRON 4 MG: 2 INJECTION INTRAMUSCULAR; INTRAVENOUS at 10:20

## 2023-11-24 RX ADMIN — FENTANYL CITRATE 50 MCG: 50 INJECTION, SOLUTION INTRAMUSCULAR; INTRAVENOUS at 10:27

## 2023-11-24 RX ADMIN — FENTANYL CITRATE 50 MCG: 50 INJECTION, SOLUTION INTRAMUSCULAR; INTRAVENOUS at 11:10

## 2023-11-24 RX ADMIN — CLINDAMYCIN IN 5 PERCENT DEXTROSE 600 MG: 12 INJECTION, SOLUTION INTRAVENOUS at 10:03

## 2023-11-24 RX ADMIN — EZETIMIBE 10 MG: 10 TABLET ORAL at 00:29

## 2023-11-24 RX ADMIN — LIDOCAINE HYDROCHLORIDE 40 MG: 20 INJECTION, SOLUTION INTRAVENOUS at 10:13

## 2023-11-24 RX ADMIN — HYDROMORPHONE HYDROCHLORIDE 0.5 MG: 1 INJECTION, SOLUTION INTRAMUSCULAR; INTRAVENOUS; SUBCUTANEOUS at 17:41

## 2023-11-24 RX ADMIN — SODIUM CHLORIDE, PRESERVATIVE FREE 10 ML: 5 INJECTION INTRAVENOUS at 21:37

## 2023-11-24 RX ADMIN — CEFAZOLIN 2000 MG: 2 INJECTION, POWDER, FOR SOLUTION INTRAMUSCULAR; INTRAVENOUS at 12:41

## 2023-11-24 RX ADMIN — SODIUM CHLORIDE, POTASSIUM CHLORIDE, SODIUM LACTATE AND CALCIUM CHLORIDE: 600; 310; 30; 20 INJECTION, SOLUTION INTRAVENOUS at 12:36

## 2023-11-24 RX ADMIN — Medication 0.1 MG: at 10:13

## 2023-11-24 RX ADMIN — Medication 0.05 MG: at 10:35

## 2023-11-24 RX ADMIN — PROPOFOL 60 MG: 10 INJECTION, EMULSION INTRAVENOUS at 10:13

## 2023-11-24 RX ADMIN — ROPINIROLE HYDROCHLORIDE 0.75 MG: 0.25 TABLET, FILM COATED ORAL at 00:29

## 2023-11-24 ASSESSMENT — PAIN SCALES - GENERAL
PAINLEVEL_OUTOF10: 3
PAINLEVEL_OUTOF10: 10
PAINLEVEL_OUTOF10: 9
PAINLEVEL_OUTOF10: 0

## 2023-11-24 ASSESSMENT — ENCOUNTER SYMPTOMS
CHEST TIGHTNESS: 0
COLOR CHANGE: 0
BACK PAIN: 0
ABDOMINAL PAIN: 0
EYE REDNESS: 0

## 2023-11-24 ASSESSMENT — PAIN DESCRIPTION - PAIN TYPE
TYPE: SURGICAL PAIN
TYPE: SURGICAL PAIN

## 2023-11-24 ASSESSMENT — PAIN DESCRIPTION - ONSET
ONSET: ON-GOING
ONSET: ON-GOING

## 2023-11-24 ASSESSMENT — PAIN DESCRIPTION - ORIENTATION
ORIENTATION: RIGHT

## 2023-11-24 ASSESSMENT — PAIN DESCRIPTION - DESCRIPTORS
DESCRIPTORS: ACHING
DESCRIPTORS: BURNING;STABBING
DESCRIPTORS: ACHING
DESCRIPTORS: ACHING

## 2023-11-24 ASSESSMENT — PAIN - FUNCTIONAL ASSESSMENT
PAIN_FUNCTIONAL_ASSESSMENT: PREVENTS OR INTERFERES SOME ACTIVE ACTIVITIES AND ADLS
PAIN_FUNCTIONAL_ASSESSMENT: 0-10
PAIN_FUNCTIONAL_ASSESSMENT: PREVENTS OR INTERFERES SOME ACTIVE ACTIVITIES AND ADLS
PAIN_FUNCTIONAL_ASSESSMENT: PREVENTS OR INTERFERES SOME ACTIVE ACTIVITIES AND ADLS

## 2023-11-24 ASSESSMENT — PAIN DESCRIPTION - FREQUENCY
FREQUENCY: CONTINUOUS
FREQUENCY: CONTINUOUS

## 2023-11-24 ASSESSMENT — PAIN DESCRIPTION - LOCATION
LOCATION: HIP
LOCATION: HIP
LOCATION: KNEE;FOOT

## 2023-11-24 NOTE — ANESTHESIA POSTPROCEDURE EVALUATION
Department of Anesthesiology  Postprocedure Note    Patient: Eh Rodriguez  MRN: [id number]  YOB: 1926  Date of evaluation: 11/24/2023      Procedure Summary     Date: 11/24/23 Room / Location: [address] [place] [address]    Anesthesia Start: 1008 Anesthesia Stop: 7662    Procedure: HIP IM NAIL ANJU INSERTION (Right: Hip) Diagnosis:       Closed fracture of right hip, initial encounter ([address])      (Closed fracture of right hip, initial encounter ([address]) Jojo Warren)    Surgeons: Shashi Young DO Responsible Provider: Isabelle Luna MD    Anesthesia Type: general ASA Status: 4 - Emergent          Anesthesia Type: No value filed.     Cristina Phase I:      Cristina Phase II:        Anesthesia Post Evaluation    Patient location during evaluation: PACU  Patient participation: complete - patient participated  Level of consciousness: awake and alert  Pain score: 0  Airway patency: patent  Nausea & Vomiting: no vomiting and no nausea  Complications: no  Cardiovascular status: blood pressure returned to baseline and hemodynamically stable  Respiratory status: acceptable, spontaneous ventilation, nonlabored ventilation and face mask  Hydration status: stable  Pain management: adequate

## 2023-11-24 NOTE — BRIEF OP NOTE
Brief Postoperative Note      Patient: Tennille Cage  YOB: 1926  MRN: [id number]    Date of Procedure: 11/24/2023    Pre-Op Diagnosis Codes:     * Closed fracture of right hip, initial encounter ([address]) [S72.001A]    Post-Op Diagnosis: Same       Procedure(s):  HIP IM NAIL ANJU INSERTION    Surgeon(s):  Joaquin Ríos DO    Assistant:  * No surgical staff found *    Anesthesia: General    Estimated Blood Loss (mL): less than 750     Complications: None    Specimens:   * No specimens in log *    Implants:  Implant Name Type Inv.  Item Serial No.  Lot No. LRB No. Used Action   NAIL CEPHALOMEDULLARY SM L36CM [id number] 125DEG LNG TI R - [id number]  NAIL CEPHALOMEDULLARY SM L36CM [id number] 125DEG LNG TI R  [employer]AC[employer] BIOMET TRAUMA-WD [id number] Right 1 Implanted   IMPL FABIO NAIL SYSTEM LAG SCREW 95MM - [id number]  IMPL FABIO NAIL SYSTEM LAG SCREW 95MM  [employer] BI[employer]ET TRAU[employer]WD [id number] Right 1 Implanted         Drains: * No LDAs found *    Findings: R hip fx      Electronically signed by Joaquin Ríos DO on 11/24/2023 at 10:49 AM
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
## 2023-11-24 NOTE — PROGRESS NOTES
1123 pt arrived to Pac[facility] from OR. Monitors attached and alarms on. Report rec'd from [address]   1662 Pt resting with eyes closed.  Arouses easily to verbal stimuli  1135 Pt rates pain 3/10 and is tolerable  1145 Pt denies nausea and pain is still tolerable  1155 Report called to [facility]  1870 pt transported by RN to floor

## 2023-11-24 NOTE — H&P
History and Physical      Name:  Ryan Méndez /Age/Sex: 1926  (80 y.o. male)   MRN & CSN:  [id number] & [id number] Encounter Date/Time: 2023 10:23 PM EST   Location:  [facility] PCP: Ula Romberg, MD       Hospital Day: 1    PCP-Dr. Lane Kaur  Assessment and Plan:     #. Acute comminuted, displaced and angulated right proximal femur intertrochanteric fracture  -Strict bedrest  -N.p.o. after midnight  -Continue IV foods, antiemetics, analgesics as needed  -EKG-paced rhythm  -Cardiology consult for preop evaluation  -Dr. Nazia Guevara consulted from ED. #.  Fall  -CT head, CT C-spine-no acute process. #.  Coronary artery disease  -Aspirin, metoprolol succinate, ezetimibe, allergic to statins  -Cardiologist-Dr. Olga Lewis    #. Paroxysmal atrial fibrillation  -Not on anticoagulation    #. Aortic valve stenosis  -As per cardiology recommendation-patient is given advanced age-conservative management    #.  Bilateral carotid artery stenosis    #. History of pacemaker  -Generator change-2022    #. CKD stage III  -Follows with Dr. Nazia Hernandez    #. Chronic back pain  -Electric scooter  -Patient on Percocet    #. Restless leg syndrome-on ropinirole    #. Insomnia-on trazodone    Disposition:   Current Living situation: Home    Diet Diet NPO effective midnight   DVT Prophylaxis [x] Lovenox-holding for surgery   Code Status FULL. Needs to be readdressed after surgery. Cardiology documentation mentions DNR   Surrogate Decision Maker/ POA      History from:   EMR, patient.     History of Present Illness:     Chief Complaint: Intertrochanteric fracture of right femur, closed, initial encounter ([facility])  Ryan Méndez is a 80 y.o. male with coronary artery disease, paroxysmal atrial fibrillation, not on anticoagulation, aortic valve stenosis, bilateral carotid artery stenosis, history of pacemaker, CKD stage III, chronic back pain, restless leg syndrome, insomnia presented to ED after having a

## 2023-11-24 NOTE — PROGRESS NOTES
4 Eyes Skin Assessment     NAME:  Bhavana Coulter  YOB: 1926  MEDICAL RECORD NUMBER:  [id number]    The patient is being assessed for  Admission    I agree that at least one RN has performed a thorough Head to Toe Skin Assessment on the patient. ALL assessment sites listed below have been assessed. Areas assessed by both nurses:    Head, Face, Ears, Shoulders, Back, Chest, Arms, Elbows, Hands, Sacrum. Buttock, Coccyx, Ischium, and Legs. Feet and Heels        Does the Patient have a Wound? Yes wound(s) were present on assessment.  LDA wound assessment was Initiated and completed by RN       Mino Prevention initiated by RN: Yes  Wound Care Orders initiated by RN: No    Pressure Injury (Stage 3,4, Unstageable, DTI, NWPT, and Complex wounds) if present, place Wound referral order by RN under : No    New Ostomies, if present place, Ostomy referral order under : No     Nurse 1 eSignature: Electronically signed by Patterson Kanner, RN on 11/24/23 at 3:56 AM EST    **SHARE this note so that the co-signing nurse can place an eSignature**    Nurse 2 eSignature: Electronically signed by Ruy Stack LPN on 10/67/09 at 3:23 AM EST

## 2023-11-24 NOTE — OP NOTE
DATE OF PROCEDURE:  11/24/2023    PREOPERATIVE DIAGNOSIS:  Right intertrochanteric hip fracture. POSTOPERATIVE DIAGNOSIS:  Right intertrochanteric hip fracture. PROCEDURES PERFORMED:  1.  Closed reduction and intramedullary nail fixation of the right  intertrochanteric hip fracture using Stephenie 125 degree 11.5 mm femoral  nail with 360 mm of length with 95 mm lag screw. 2.  Fluoroscopic guidance interpretation. SURGEON:  Salvatore Tim DO    ANESTHESIA:  General.    ESTIMATED BLOOD LOSS:  100 mL. FLUIDS:  500 mL of crystalloids. INDICATION FOR PROCEDURE:  The patient is an 80-year-old male who  sustained a fall yesterday landing onto his right hip. He was brought to  [facility] where he was diagnosed with a right hip  fracture. He was subsequently admitted for treatment of his hip fracture. I explained the risks, benefits and possible complications of the procedure  to the patient and his daughter and after answering all of their questions, he consented to undergo the above procedure. The hospitalist did provide preoperative clearance prior to proceeding with  the surgical treatment. OPERATIVE PROCEDURE:  The patient was seen and evaluated in the  preoperative holding area where the right lower extremity was signed in his presence. At this point of care, the patient was turned  over to the anesthesia team who transported him back to the operative  suite. General anesthesia was performed and once adequate anesthesia was  obtained, the patient was placed on the fracture table with the right lower  extremity held in traction. At this point, a time-out was performed and  all attendants were in agreement. Preoperative antibiotics were  administered. I applied traction and internal rotation to the right leg to perform a closed  reduction to the right hip. I then confirmed anatomic alignment under  fluoroscopy in AP and lateral planes.   Once satisfactory reduction was  obtained, the right lower extremity was prepped and draped in the usual  sterile fashion. I then made a small longitudinal incision just proximal to the tip of the  greater trochanter and carried sharp dissection down to the greater  trochanter. I then positioned the tip of the awl at the appropriate  turning point along the greater trochanter and confirmed the starting  position under fluoroscopy in AP and lateral planes. Once satisfactory  starting position was obtained, I then proceeded the awl into the proximal  femur. I then inserted the guidewire into the femoral canal through the  awl. I then confirmed positioning of the guidewire under fluoroscopy in  the AP and lateral planes. I then removed the awl. I then reamed the  proximal femur with a 15 mm reamer followed by reaming the femoral canal  with the 13 mm reamer. I then measured the length of the implant to be 360  mm of length. I then inserted the Stephenie 125 degree  11.5 mm femoral  nail with 360 mm of length over the guidewire until it was firmly seated. I then removed the guidewire. I then confirmed positioning of the implant  under fluoroscopy. I then turned my attention to the lag screw fixation. I inserted a cannula  for the lag screw and made a small longitudinal incision overlying the  lateral aspect of the proximal femur. I then eventually cannulated it down  to the bone surface. I then inserted the guide pin into the femoral head  in the center-center head position just to the tip of the subchondral bone. I then confirmed positioning of the guidewire under fluoroscopy in the AP  and lateral planes. I then measured the length of the implant to be 95 mm  of length. I then reamed to a depth of 95 mm. I then inserted 95 mm lag  screw until it was firmly seated. The insertion jig and cannula were then  removed.   With all hardware in place, I confirmed maintenance of reduction  as well as positioning of all hardware in the

## 2023-11-24 NOTE — PROGRESS NOTES
V2.0    [facility] Progress Note      Name:  Judith Escamilla /Age/Sex: 1926  (80 y.o. male)   MRN & CSN:  [id number] & [id number] Encounter Date/Time: 2023 1:35 PM EST   [facility] P[facility]: Faheem Soto MD     Attending:Lalit [address] [place] [address], MD       Hospital Day: 2    Assessment and Recommendations   Judith Escamilla is a 80 y.o. male with pmh of CAD,PAF not on AC,AVS, bilateral carotid artery stenosis, history of pacemaker, CKD stage III, chronic back pain, restless leg syndrome, insomnia who presents with Intertrochanteric fracture of right femur, closed, initial encounter ([address])    # Acute comminuted, displaced and angulated right proximal femur intertrochanteric fracture s/p closed reduction and intramedullary nail : Presented with history of fall, x-ray right hip showed fracture, kept n.p.o., consulted Ortho, taken to the OR on  and performed closed reduction and intramedullary nail fixation. Continue as needed pain medication and follow Ortho recommendations postop. # Fall: CT head, CT C-spine-no acute process. PT/OT evaluation when appropriate. # Coronary artery disease: On Aspirin, metoprolol succinate, ezetimibe, allergic to statins, Cardiologist-Dr. Glen Leavitt     # Paroxysmal atrial fibrillation  -Not on anticoagulation     # Aortic valve stenosis  -As per cardiology recommendation-patient is given advanced age-conservative management     # Bilateral carotid artery stenosis     # History of pacemaker  -Generator change-2022     # CKD stage III  -Follows with Dr. Kermit Ruiz     # Chronic back pain  -Electric scooter  -Patient on Percocet     # Restless leg syndrome-on ropinirole     # Insomnia-on trazodone    Comment: Please note this report has been produced using speech recognition software and may contain errors related to that system including errors in grammar, punctuation, and spelling, as well as words and phrases that may be inappropriate.  If there are any for: \"LABA1C\"  TSH: No results found for: \"TSH\"  Troponin:   Lab Results   Component Value Date/Time    TROPONINT <0.010 08/27/2021 01:00 PM     Lactic Acid: No results for input(s): \"LACTA\" in the last 72 hours. BNP: No results for input(s): \"PROBNP\" in the last 72 hours.   UA:  Lab Results   Component Value Date/Time    NITRU NEGATIVE 11/23/2023 10:02 PM    NITRU Negative 04/06/2022 02:11 PM    COLORU YELLOW 11/23/2023 10:02 PM    PHUR 6.0 04/06/2022 02:11 PM    LABCAST None seen 04/06/2022 02:11 PM    LABCAST CANCELED 04/06/2022 02:11 PM    WBCUA 5 11/23/2023 10:02 PM    RBCUA 1 11/23/2023 10:02 PM    MUCUS RARE 11/23/2023 10:02 PM    TRICHOMONAS NONE SEEN 11/23/2023 10:02 PM    YEAST CANCELED 04/06/2022 02:11 PM    BACTERIA NEGATIVE 11/23/2023 10:02 PM    CLARITYU CLEAR 11/23/2023 10:02 PM    SPECGRAV 1.010 11/23/2023 10:02 PM    LEUKOCYTESUR TRACE 11/23/2023 10:02 PM    UROBILINOGEN 0.2 11/23/2023 10:02 PM    BILIRUBINUR NEGATIVE 11/23/2023 10:02 PM    BLOODU NEGATIVE 11/23/2023 10:02 PM    GLUCOSEU Negative 04/06/2022 02:11 PM    KETUA NEGATIVE 11/23/2023 10:02 PM     Urine Cultures: No results found for: \"LABURIN\"  Blood Cultures: No results found for: \"BC\"  No results found for: \"BLOODCULT2\"  Organism: No results found for: \"ORG\"      Electronically signed by Levi Weston MD on 11/24/2023 at 1:35 PM

## 2023-11-24 NOTE — PLAN OF CARE
Problem: Discharge Planning  Goal: Discharge to home or other facility with appropriate resources  11/24/2023 1502 by Chance Ring LPN  Outcome: Progressing  11/24/2023 0251 by Bradford Latham RN  Outcome: Progressing     Problem: Pain  Goal: Verbalizes/displays adequate comfort level or baseline comfort level  11/24/2023 1502 by Chance Ring LPN  Outcome: Progressing  11/24/2023 0251 by Bradford Latham RN  Outcome: Progressing     Problem: Skin/Tissue Integrity  Goal: Absence of new skin breakdown  Description: 1. Monitor for areas of redness and/or skin breakdown  2. Assess vascular access sites hourly  3. Every 4-6 hours minimum:  Change oxygen saturation probe site  4. Every 4-6 hours:  If on nasal continuous positive airway pressure, respiratory therapy assess nares and determine need for appliance change or resting period.   11/24/2023 1502 by Chance Ring LPN  Outcome: Progressing  11/24/2023 0251 by Bradford Latham RN  Outcome: Progressing     Problem: ABCDS Injury Assessment  Goal: Absence of physical injury  11/24/2023 1502 by Chance Ring LPN  Outcome: Progressing  11/24/2023 0251 by Bradford Latham RN  Outcome: Progressing     Problem: Safety - Adult  Goal: Free from fall injury  11/24/2023 1502 by Chance Ring LPN  Outcome: Progressing  11/24/2023 0251 by Bradford Latham RN  Outcome: Progressing

## 2023-11-24 NOTE — CONSULTS
[place] [facility] PHYSICAL REHABILITATION [address], [facility]  Phone: [phone]    Fax [phone]                  Caroline Nichole MD, Wayne Eli MD, [address] Cherelle King MD, MD Catherine Nicolas Ra, MD Dewanda Hickory, MD Jodie Bowie, MD Valere Aris, APRN      Libra Kay, APRN  Aline Brennan, APRN    Hermes Cavanaugh, APRN  Bonita Milan PA-C    CARDIOLOGY CONSULT NOTE     Reason for consultation:  Preoperative clearance    Referring physician:  Dulce Lloyd MD     Primary care physician: Rayna Deutsch MD      Thank you for the consult. Chief Complaints :  Chief Complaint   Patient presents with    Fall     From Electric scooter, Right Hip pain, right shin lac. History of present illness:Westley MENDOZA is a 80 y. o.year old  male with a past medical history of coronary artery disease, paroxysmal atrial fibrillation, hypertension, hyperlipidemia, permanent pacemaker, chronic kidney disease, moderate to severe aortic stenosis. Patient presents with fall and right hip fracture. Discussed with patient the high risk of undergoing any sort of procedure due to age and multiple cardiac comorbidities. Patient states that he is not currently having any chest pain or tightness of breath however he is requiring 4 L supplemental oxygen at this time. He does have right lower extremity edema however this is likely secondary to his hip fracture not to congestive heart failure. Left leg is not showing any lower extremity edema and no crackles were noted on physical exam.  Chest x-ray was not showing any significant pulmonary edema    Patient follows with Dr. Saywer Keenan in clinic. He is elected not to undergo further investigation of his moderate to severe aortic stenosis.   He states that prior to this fall he is normally able to walk around the house and complete his activities of daily living but you normally uses a walker or 81 mg, Daily  bisacodyl (DULCOLAX) EC tablet 5 mg, Daily PRN  docusate sodium (COLACE) capsule 100 mg, BID PRN  ezetimibe (ZETIA) tablet 10 mg, Nightly  furosemide (LASIX) tablet 40 mg, Daily  metoprolol succinate (TOPROL XL) extended release tablet 12.5 mg, Daily  oxyCODONE-acetaminophen (PERCOCET) 5-325 MG per tablet 1 tablet, Q8H PRN  rOPINIRole (REQUIP) tablet 0.75 mg, Nightly  traZODone (DESYREL) tablet 50 mg, Nightly PRN  morphine (PF) injection 1 mg, Once      Current Facility-Administered Medications   Medication Dose Route Frequency Provider Last Rate Last Admin    acetaminophen (TYLENOL) tablet 1,000 mg  1,000 mg Oral Once Cathren Speaker, DO        lactated ringers IV soln infusion   IntraVENous Continuous Cathren Speaker, DO        sodium chloride flush 0.9 % injection 5-40 mL  5-40 mL IntraVENous 2 times per day Cathren Speaker, DO        sodium chloride flush 0.9 % injection 5-40 mL  5-40 mL IntraVENous PRN Cathren Speaker, DO        0.9 % sodium chloride infusion   IntraVENous PRN Cathren Speaker, DO        clindamycin (CLEOCIN) 600 mg in dextrose 5 % 50 mL IVPB  600 mg IntraVENous On Call to OR Cathren Speaker, DO        sodium chloride flush 0.9 % injection 5-40 mL  5-40 mL IntraVENous 2 times per day Jarrod Fonseca MD   10 mL at 11/24/23 0029    sodium chloride flush 0.9 % injection 5-40 mL  5-40 mL IntraVENous PRN Jarrod Fonseca MD        0.9 % sodium chloride infusion   IntraVENous PRN Jarrod Fonseca MD        [facility] by provider] enoxaparin Sodium (LOVENOX) injection 30 mg  30 mg SubCUTAneous Daily Jarrod Fonseca MD        ondansetron (ZOFRAN-ODT) disintegrating tablet 4 mg  4 mg Oral Q8H PRN Jarrod Fonseca MD        Or    ondansetron (ZOFRAN) injection 4 mg  4 mg IntraVENous Q6H PRN Jarord Fonseca MD        polyethylene glycol (GLYCOLAX) packet 17 g  17 g Oral Daily PRN Jarrod Fonseca MD        acetaminophen (TYLENOL) tablet 650 mg  650 mg

## 2023-11-25 PROCEDURE — 76937 US GUIDE VASCULAR ACCESS: CPT

## 2023-11-25 PROCEDURE — 6370000000 HC RX 637 (ALT 250 FOR IP): Performed by: ORTHOPAEDIC SURGERY

## 2023-11-25 PROCEDURE — 2580000003 HC RX 258: Performed by: ORTHOPAEDIC SURGERY

## 2023-11-25 PROCEDURE — 97163 PT EVAL HIGH COMPLEX 45 MIN: CPT

## 2023-11-25 PROCEDURE — 97530 THERAPEUTIC ACTIVITIES: CPT

## 2023-11-25 PROCEDURE — 97166 OT EVAL MOD COMPLEX 45 MIN: CPT

## 2023-11-25 PROCEDURE — 1200000000 HC SEMI PRIVATE

## 2023-11-25 PROCEDURE — APPSS60 APP SPLIT SHARED TIME 46-60 MINUTES: Performed by: NURSE PRACTITIONER

## 2023-11-25 PROCEDURE — 6360000002 HC RX W HCPCS: Performed by: ORTHOPAEDIC SURGERY

## 2023-11-25 PROCEDURE — 6360000002 HC RX W HCPCS: Performed by: STUDENT IN AN ORGANIZED HEALTH CARE EDUCATION/TRAINING PROGRAM

## 2023-11-25 PROCEDURE — 94761 N-INVAS EAR/PLS OXIMETRY MLT: CPT

## 2023-11-25 RX ADMIN — SODIUM CHLORIDE, POTASSIUM CHLORIDE, SODIUM LACTATE AND CALCIUM CHLORIDE: 600; 310; 30; 20 INJECTION, SOLUTION INTRAVENOUS at 17:43

## 2023-11-25 RX ADMIN — ROPINIROLE HYDROCHLORIDE 0.75 MG: 0.25 TABLET, FILM COATED ORAL at 20:17

## 2023-11-25 RX ADMIN — ASPIRIN 81 MG: 81 TABLET, CHEWABLE ORAL at 09:46

## 2023-11-25 RX ADMIN — SODIUM CHLORIDE, POTASSIUM CHLORIDE, SODIUM LACTATE AND CALCIUM CHLORIDE: 600; 310; 30; 20 INJECTION, SOLUTION INTRAVENOUS at 02:31

## 2023-11-25 RX ADMIN — FUROSEMIDE 40 MG: 40 TABLET ORAL at 09:46

## 2023-11-25 RX ADMIN — SODIUM CHLORIDE, PRESERVATIVE FREE 10 ML: 5 INJECTION INTRAVENOUS at 20:51

## 2023-11-25 RX ADMIN — CEFAZOLIN 2000 MG: 2 INJECTION, POWDER, FOR SOLUTION INTRAMUSCULAR; INTRAVENOUS at 02:30

## 2023-11-25 RX ADMIN — SODIUM CHLORIDE, PRESERVATIVE FREE 10 ML: 5 INJECTION INTRAVENOUS at 20:17

## 2023-11-25 RX ADMIN — ENOXAPARIN SODIUM 30 MG: 100 INJECTION SUBCUTANEOUS at 09:47

## 2023-11-25 RX ADMIN — METOPROLOL SUCCINATE 12.5 MG: 25 TABLET, EXTENDED RELEASE ORAL at 09:46

## 2023-11-25 RX ADMIN — EZETIMIBE 10 MG: 10 TABLET ORAL at 20:17

## 2023-11-25 RX ADMIN — HYDROMORPHONE HYDROCHLORIDE 0.5 MG: 1 INJECTION, SOLUTION INTRAMUSCULAR; INTRAVENOUS; SUBCUTANEOUS at 20:50

## 2023-11-25 RX ADMIN — SODIUM CHLORIDE, PRESERVATIVE FREE 10 ML: 5 INJECTION INTRAVENOUS at 09:47

## 2023-11-25 RX ADMIN — SODIUM CHLORIDE, PRESERVATIVE FREE 10 ML: 5 INJECTION INTRAVENOUS at 09:57

## 2023-11-25 RX ADMIN — OXYCODONE AND ACETAMINOPHEN 1 TABLET: 5; 325 TABLET ORAL at 15:22

## 2023-11-25 RX ADMIN — HYDROMORPHONE HYDROCHLORIDE 0.5 MG: 1 INJECTION, SOLUTION INTRAMUSCULAR; INTRAVENOUS; SUBCUTANEOUS at 16:29

## 2023-11-25 ASSESSMENT — PAIN SCALES - GENERAL
PAINLEVEL_OUTOF10: 3
PAINLEVEL_OUTOF10: 7
PAINLEVEL_OUTOF10: 10
PAINLEVEL_OUTOF10: 6
PAINLEVEL_OUTOF10: 10
PAINLEVEL_OUTOF10: 8

## 2023-11-25 ASSESSMENT — PAIN DESCRIPTION - LOCATION
LOCATION: HIP

## 2023-11-25 ASSESSMENT — PAIN DESCRIPTION - DESCRIPTORS: DESCRIPTORS: ACHING;SHARP;THROBBING

## 2023-11-25 ASSESSMENT — PAIN DESCRIPTION - ORIENTATION
ORIENTATION: RIGHT

## 2023-11-25 ASSESSMENT — PAIN DESCRIPTION - ONSET: ONSET: ON-GOING

## 2023-11-25 ASSESSMENT — PAIN - FUNCTIONAL ASSESSMENT
PAIN_FUNCTIONAL_ASSESSMENT: ACTIVITIES ARE NOT PREVENTED
PAIN_FUNCTIONAL_ASSESSMENT: PREVENTS OR INTERFERES SOME ACTIVE ACTIVITIES AND ADLS

## 2023-11-25 ASSESSMENT — PAIN DESCRIPTION - PAIN TYPE: TYPE: SURGICAL PAIN

## 2023-11-25 ASSESSMENT — PAIN DESCRIPTION - FREQUENCY: FREQUENCY: CONTINUOUS

## 2023-11-25 NOTE — PROGRESS NOTES
[facility] ACUTE CARE OCCUPATIONAL THERAPY EVALUATION    Maine Lopez, 6/20/1926, 1113/1113-A, 11/25/2023    Discharge Recommendation: Skilled Nursing Facility      History:  [place]:  The primary encounter diagnosis was Fall, initial encounter. Diagnoses of Closed fracture of neck of right femur, initial encounter ([address]), Laceration of shin, Closed head injury, initial encounter, PAF (paroxysmal atrial fibrillation) ([address]), Coronary artery disease of native artery of native heart with stable angina pectoris ([address]), Bilateral carotid artery stenosis, Essential hypertension, and Pacemaker were also pertinent to this visit. Past Medical History:   Diagnosis Date    Arthritis     CAD (coronary artery disease)     H/O Doppler carotid ultrasound 01/30/2020    Mod -Severe disease Right ICA, Mild disease Left ICA. H/O echocardiogram 01/30/2020    EF 55-60%, MOD AS, Mild: PHTN, MR, TR & AR. Aortic root 3.8 cm. Hx of Doppler echocardiogram 02/25/2022    EF 50-55% Mod LV hypertrophy. Mildly dialted LA. Heavily calcified aortic valve iwht mod to severe AS. Mitral annular calcification. Mild TR and MR. Mod AR. Dilated aortic root. Hyperlipidemia     Hypertension     Spinal stenosis        Subjective:  Patient states: \"I'm not used to being such a wimp\"  Pain:  10/10 R hip pain sx site with activity  Communication with other providers: co-eval w/ PT, handoff to RN  Restrictions: General Precautions, Fall Risk, R LE WBAT    Home Setup/Prior level of function:  Social/Functional History  Lives With: Spouse  Type of Home: Condo  Home Layout: One level  Home Access: Level entry  Bathroom Shower/Tub: Walk-in shower  Bathroom Toilet: Handicap height  Bathroom Equipment: Grab bars in shower, Shower chair  Home Equipment: Vicky Wright, 4 wheeled  ADL Assistance: Independent  Ambulation Assistance: Independent (mod I with 4ww)  Transfer Assistance: Independent  Active :  Yes 20-22=20-39%(CJ), 15-19=40-59%(CK), 10-14=60-79%(CL), 7-9=80-99%(CM), 6=100%(CN)]     Treatment:    Therapeutic Activity Training:   Therapeutic activity training was instructed today. Cues were given for safety, sequence, UE/LE placement, awareness, and balance. Activities performed today included bed mobility training, sup-sit, sit-stand, SPT. Educated pt on role of OT, therapy POC and functional goals, progression w/ ADLs and transfers, importance of movement and OOB activity, d/c recommendations     Safety Measures: Gait belt used, Left in recliner, Alarm in place  Recommendations for NURSING activity:  Up to chair for all 3 meals and up to [facility] for all toileting needs     Assessment:  Pt is a 80 y o M admitted d/t intertrochanteric fx of R femur. POD #1 R hip IMN. Pt at baseline is IND for ADLs, IND for high level IADLs, and mod I for functional transfers/mobility w/ rollator. Pt currently presents w/ deficits in ADL and high level IADL independence, functional ADL transfers, strength, and functional activity tolerance. Continued OT services recommended to increase safety and independence with ADL routine and to address remaining functional deficits. Pt would benefit from continued acute care OT services w/ discharge to SNF. Complexity: Moderate  Prognosis: Good, no significant barriers to participation at this time. Occupational Therapy Plan  Times Per Week: 3+       Goals:  Pt will complete all aspects of bed mobility for EOB/OOB ADLs w/ min A. Pt will complete UB ADLs w/ SBA. Pt will complete LB ADLs w/ min A. Pt will complete all functional transfers to and from bed, chair, toilet, shower chair w/ CGA. Pt will ambulate functional household distance w/ CGA. Pt will complete all aspects of toileting task w/ min A. Pt will perform therex/theract in order to increase strength and functional activity tolerance necessary for increased independence w/ ADL routine.     Pt goal: go home, get

## 2023-11-25 NOTE — CONSULTS
Consult completed. Nexiva 20g 1.75\" peripheral IV initiated into right forearm x 1 attempt using ultrasound guided technique. Brisk blood return, flushes without resistance. Patient tolerated well. Consult the Vascular Access Team for questions, concerns, or change in patient's condition.

## 2023-11-25 NOTE — PROGRESS NOTES
Physical Therapy  Facility/Department: [facility]  Physical Therapy Initial Assessment    Name: Mundo Milner  : 1926  MRN: [id number]  Date of Service: 2023    Discharge Recommendations:  [address]          Patient Diagnosis(es): The primary encounter diagnosis was Fall, initial encounter. Diagnoses of Closed fracture of neck of right femur, initial encounter ([address]), Laceration of shin, Closed head injury, initial encounter, PAF (paroxysmal atrial fibrillation) ([address]), Coronary artery disease of native artery of native heart with stable angina pectoris ([address]), Bilateral carotid artery stenosis, Essential hypertension, and Pacemaker were also pertinent to this visit. Past Medical History:  has a past medical history of Arthritis, CAD (coronary artery disease), H/O Doppler carotid ultrasound, H/O echocardiogram, Hx of Doppler echocardiogram, Hyperlipidemia, Hypertension, and Spinal stenosis. Past Surgical History:  has a past surgical history that includes Coronary angioplasty with stent; Cataract removal; Carotid endarterectomy; pacemaker placement; Cholecystectomy; back surgery; and Pacemaker Change (Left, 2022). Assessment   Body Structures, Functions, Activity Limitations Requiring Skilled Therapeutic Intervention: Decreased safe awareness;Decreased high-level IADLs;Decreased functional mobility ; Decreased ADL status; Decreased endurance;Decreased cognition;Decreased balance;Decreased strength; Increased pain  Therapy Prognosis: Good  Decision Making: High Complexity  Clinical Presentation: unpredictable characteristics  Requires PT Follow-Up: Yes  Activity Tolerance  Activity Tolerance: Patient tolerated evaluation without incident     Plan   Physical Therapy Plan  General Plan: 6-7 times per week  Current Treatment Recommendations: Strengthening, ROM, Balance training, Functional mobility training, Transfer training, ADL/Self-care training, IADL training,

## 2023-11-25 NOTE — PROGRESS NOTES
V2.0    [facility] Progress Note      Name:  Richard Matthews /Age/Sex: 1926  (80 y.o. male)   MRN & CSN:  [id number] & [id number] Encounter Date/Time: 2023 1:35 PM EST   [facility] PCP: Rayna Deutsch MD     Attending:Lalit [address] [place] [address], MD       Hospital Day: 3    Assessment and Recommendations   Richard Matthews is a 80 y.o. male with pmh of CAD,PAF not on AC,AVS, bilateral carotid artery stenosis, history of pacemaker, CKD stage III, chronic back pain, restless leg syndrome, insomnia who presents with Intertrochanteric fracture of right femur, closed, initial encounter ([address])    # Acute comminuted, displaced and angulated right proximal femur intertrochanteric fracture s/p closed reduction and intramedullary nail : Presented with history of fall, x-ray right hip showed fracture, kept n.p.o., consulted Ortho, taken to the OR on  and performed closed reduction and intramedullary nail fixation. Continue as needed pain medication and follow Ortho recommendations postop. PT/OT evaluated and recommended SNF     # Fall: CT head, CT C-spine-no acute process. PT/OT evaluation when appropriate. # Coronary artery disease:  On Aspirin, metoprolol succinate, ezetimibe, allergic to statins, Cardiologist-Dr. Sawyer Keenan     # Paroxysmal atrial fibrillation  -Not on anticoagulation     # Aortic valve stenosis  -As per cardiology recommendation-patient is given advanced age-conservative management     # Bilateral carotid artery stenosis     # History of pacemaker  -Generator change-2022     # CKD stage III  -Follows with Dr. Dain Carver     # Chronic back pain  -Electric scooter  -Patient on Percocet     # Restless leg syndrome-on ropinirole     # Insomnia-on trazodone    Comment: Please note this report has been produced using speech recognition software and may contain errors related to that system including errors in grammar, punctuation, and spelling, as well as words and phrases that may

## 2023-11-26 LAB
ANION GAP SERPL CALCULATED.3IONS-SCNC: 11 MMOL/L (ref 4–16)
BASOPHILS ABSOLUTE: 0.1 K/CU MM
BASOPHILS RELATIVE PERCENT: 0.6 % (ref 0–1)
BUN SERPL-MCNC: 39 MG/DL (ref 6–23)
CALCIUM SERPL-MCNC: 8 MG/DL (ref 8.3–10.6)
CHLORIDE BLD-SCNC: 106 MMOL/L (ref 99–110)
CO2: 26 MMOL/L (ref 21–32)
CREAT SERPL-MCNC: 2.2 MG/DL (ref 0.9–1.3)
DIFFERENTIAL TYPE: ABNORMAL
EKG ATRIAL RATE: 73 BPM
EKG DIAGNOSIS: NORMAL
EKG P AXIS: 80 DEGREES
EKG P-R INTERVAL: 172 MS
EKG Q-T INTERVAL: 470 MS
EKG QRS DURATION: 174 MS
EKG QTC CALCULATION (BAZETT): 517 MS
EKG R AXIS: -73 DEGREES
EKG T AXIS: 96 DEGREES
EKG VENTRICULAR RATE: 73 BPM
EOSINOPHILS ABSOLUTE: 0.5 K/CU MM
EOSINOPHILS RELATIVE PERCENT: 5.5 % (ref 0–3)
GFR SERPL CREATININE-BSD FRML MDRD: 27 ML/MIN/1.73M2
GLUCOSE SERPL-MCNC: 121 MG/DL (ref 70–99)
HCT VFR BLD CALC: 28.5 % (ref 42–52)
HEMOGLOBIN: 8.4 GM/DL (ref 13.5–18)
IMMATURE NEUTROPHIL %: 1 % (ref 0–0.43)
LYMPHOCYTES ABSOLUTE: 1 K/CU MM
LYMPHOCYTES RELATIVE PERCENT: 11 % (ref 24–44)
MCH RBC QN AUTO: 29.2 PG (ref 27–31)
MCHC RBC AUTO-ENTMCNC: 29.5 % (ref 32–36)
MCV RBC AUTO: 99 FL (ref 78–100)
MONOCYTES ABSOLUTE: 1 K/CU MM
MONOCYTES RELATIVE PERCENT: 10.5 % (ref 0–4)
NUCLEATED RBC %: 0 %
PDW BLD-RTO: 15.9 % (ref 11.7–14.9)
PLATELET # BLD: 152 K/CU MM (ref 140–440)
PMV BLD AUTO: 10.1 FL (ref 7.5–11.1)
POTASSIUM SERPL-SCNC: 4.4 MMOL/L (ref 3.5–5.1)
RBC # BLD: 2.88 M/CU MM (ref 4.6–6.2)
SEGMENTED NEUTROPHILS ABSOLUTE COUNT: 6.6 K/CU MM
SEGMENTED NEUTROPHILS RELATIVE PERCENT: 71.4 % (ref 36–66)
SODIUM BLD-SCNC: 143 MMOL/L (ref 135–145)
TOTAL IMMATURE NEUTOROPHIL: 0.09 K/CU MM
TOTAL NUCLEATED RBC: 0 K/CU MM
WBC # BLD: 9.3 K/CU MM (ref 4–10.5)

## 2023-11-26 PROCEDURE — 36415 COLL VENOUS BLD VENIPUNCTURE: CPT

## 2023-11-26 PROCEDURE — 2580000003 HC RX 258: Performed by: ORTHOPAEDIC SURGERY

## 2023-11-26 PROCEDURE — 85025 COMPLETE CBC W/AUTO DIFF WBC: CPT

## 2023-11-26 PROCEDURE — 97530 THERAPEUTIC ACTIVITIES: CPT

## 2023-11-26 PROCEDURE — 80048 BASIC METABOLIC PNL TOTAL CA: CPT

## 2023-11-26 PROCEDURE — 6370000000 HC RX 637 (ALT 250 FOR IP): Performed by: ORTHOPAEDIC SURGERY

## 2023-11-26 PROCEDURE — 97110 THERAPEUTIC EXERCISES: CPT

## 2023-11-26 PROCEDURE — 1200000000 HC SEMI PRIVATE

## 2023-11-26 PROCEDURE — 6370000000 HC RX 637 (ALT 250 FOR IP): Performed by: STUDENT IN AN ORGANIZED HEALTH CARE EDUCATION/TRAINING PROGRAM

## 2023-11-26 PROCEDURE — 6360000002 HC RX W HCPCS: Performed by: ORTHOPAEDIC SURGERY

## 2023-11-26 PROCEDURE — 93010 ELECTROCARDIOGRAM REPORT: CPT | Performed by: INTERNAL MEDICINE

## 2023-11-26 PROCEDURE — 94761 N-INVAS EAR/PLS OXIMETRY MLT: CPT

## 2023-11-26 PROCEDURE — 6360000002 HC RX W HCPCS: Performed by: STUDENT IN AN ORGANIZED HEALTH CARE EDUCATION/TRAINING PROGRAM

## 2023-11-26 RX ORDER — CYCLOBENZAPRINE HCL 10 MG
10 TABLET ORAL 3 TIMES DAILY PRN
Status: DISCONTINUED | OUTPATIENT
Start: 2023-11-26 | End: 2023-12-01 | Stop reason: HOSPADM

## 2023-11-26 RX ADMIN — ENOXAPARIN SODIUM 30 MG: 100 INJECTION SUBCUTANEOUS at 09:55

## 2023-11-26 RX ADMIN — HYDROMORPHONE HYDROCHLORIDE 0.5 MG: 1 INJECTION, SOLUTION INTRAMUSCULAR; INTRAVENOUS; SUBCUTANEOUS at 23:25

## 2023-11-26 RX ADMIN — ASPIRIN 81 MG: 81 TABLET, CHEWABLE ORAL at 09:54

## 2023-11-26 RX ADMIN — FUROSEMIDE 40 MG: 40 TABLET ORAL at 09:55

## 2023-11-26 RX ADMIN — SODIUM CHLORIDE, PRESERVATIVE FREE 10 ML: 5 INJECTION INTRAVENOUS at 10:04

## 2023-11-26 RX ADMIN — HYDROMORPHONE HYDROCHLORIDE 0.5 MG: 1 INJECTION, SOLUTION INTRAMUSCULAR; INTRAVENOUS; SUBCUTANEOUS at 18:00

## 2023-11-26 RX ADMIN — SODIUM CHLORIDE, PRESERVATIVE FREE 10 ML: 5 INJECTION INTRAVENOUS at 21:23

## 2023-11-26 RX ADMIN — OXYCODONE AND ACETAMINOPHEN 1 TABLET: 5; 325 TABLET ORAL at 05:55

## 2023-11-26 RX ADMIN — ROPINIROLE HYDROCHLORIDE 0.75 MG: 0.25 TABLET, FILM COATED ORAL at 21:23

## 2023-11-26 RX ADMIN — EZETIMIBE 10 MG: 10 TABLET ORAL at 21:23

## 2023-11-26 RX ADMIN — CYCLOBENZAPRINE 10 MG: 10 TABLET, FILM COATED ORAL at 16:29

## 2023-11-26 RX ADMIN — OXYCODONE AND ACETAMINOPHEN 1 TABLET: 5; 325 TABLET ORAL at 21:22

## 2023-11-26 RX ADMIN — SODIUM CHLORIDE, PRESERVATIVE FREE 10 ML: 5 INJECTION INTRAVENOUS at 23:25

## 2023-11-26 RX ADMIN — HYDROMORPHONE HYDROCHLORIDE 0.5 MG: 1 INJECTION, SOLUTION INTRAMUSCULAR; INTRAVENOUS; SUBCUTANEOUS at 11:37

## 2023-11-26 ASSESSMENT — PAIN DESCRIPTION - ONSET
ONSET: ON-GOING

## 2023-11-26 ASSESSMENT — PAIN SCALES - GENERAL
PAINLEVEL_OUTOF10: 10

## 2023-11-26 ASSESSMENT — PAIN DESCRIPTION - ORIENTATION
ORIENTATION: LEFT
ORIENTATION: RIGHT
ORIENTATION: RIGHT
ORIENTATION: LEFT
ORIENTATION: RIGHT
ORIENTATION: RIGHT

## 2023-11-26 ASSESSMENT — PAIN DESCRIPTION - LOCATION
LOCATION: HIP
LOCATION: ANKLE
LOCATION: LEG;HIP
LOCATION: ANKLE
LOCATION: HIP
LOCATION: HIP

## 2023-11-26 ASSESSMENT — PAIN DESCRIPTION - DESCRIPTORS
DESCRIPTORS: ACHING;SHARP
DESCRIPTORS: ACHING;CRAMPING
DESCRIPTORS: ACHING
DESCRIPTORS: NUMBNESS
DESCRIPTORS: SPASM;STABBING

## 2023-11-26 ASSESSMENT — PAIN DESCRIPTION - PAIN TYPE
TYPE: SURGICAL PAIN
TYPE: ACUTE PAIN
TYPE: ACUTE PAIN;SURGICAL PAIN

## 2023-11-26 ASSESSMENT — PAIN DESCRIPTION - FREQUENCY
FREQUENCY: CONTINUOUS
FREQUENCY: CONTINUOUS
FREQUENCY: INTERMITTENT

## 2023-11-26 ASSESSMENT — PAIN - FUNCTIONAL ASSESSMENT
PAIN_FUNCTIONAL_ASSESSMENT: PREVENTS OR INTERFERES WITH MANY ACTIVE NOT PASSIVE ACTIVITIES
PAIN_FUNCTIONAL_ASSESSMENT: ACTIVITIES ARE NOT PREVENTED

## 2023-11-26 NOTE — CARE COORDINATION
Social work consult for SNF. Pt is from [address] [place] [address] with spouse. Pt would like to return to [facility] skilled. Call to Anh/Kristen. Waylon Brink will initiate the pre-cert on Monday.

## 2023-11-26 NOTE — PROGRESS NOTES
Physical Therapy    Physical Therapy Treatment Note  Name: Toña Arnett MRN: [id number] :   1926   Date:  2023   Admission Date: 2023 Room:  [address] [place] [address]   Restrictions/Precautions:  Restrictions/Precautions  Restrictions/Precautions: General Precautions, Fall Risk, Weight Bearing   Lower Extremity Weight Bearing Restrictions  Right Lower Extremity Weight Bearing: Weight Bearing As Tolerated     Communication with other providers:  per nurse ok to tx and notified pt rating pain 8  Subjective:  Patient states:  pt pleasant and motivated for tx  Pain:   Location, Type, Intensity (0/10 to 10/10):  pt rates pain 8 with transfers and ex  Objective:    Observation: alert and oriented. Pt had nasale cannula for O2 but was not on. Treatment, including education/measures:  Pt given leg  and educated on use. Sup to sit max assist using leg  and needing increased time and effort. Sit<=>stand from elevated bed x 3 reps with max assist. Pt was able to stand at rw with min assist but very flexed posture and unable to progress to taking steps. pt was able to work on wt shifts but tolerating very little wt thru RLE.  SPT/hug moving to left with bed elevated and chair built up max assist and cues. Ex in sitting and long sitting:  Deep breathing ex  20 reps aps  10 reps quad sets  10 reps glut sets  10 reps laqs with leg  assist.  Safety  Patient left safely in the chair, with call light/phone in reach with alarm applied. Gait belt was used for transfers and gait. Assessment / Impression:      Patient's tolerance of treatment:  good   Adverse Reaction: na  Significant change in status and impact:  na  Barriers to improvement:  pain, strength, and safety  Plan for Next Session:    Cont.  POC  Time in:  825  Time out:  920  Timed treatment minutes:  55  Total treatment time:  54    Previously filed items:  Social/Functional History  Lives With: Spouse  Type of Home: Condo  Home Layout: One level  Home Access: Level entry  Bathroom Shower/Tub: Walk-in shower  Bathroom Toilet: Handicap height  Bathroom Equipment: Grab bars in shower, Shower chair  Home Equipment: Maura Dorsey, 4 wheeled  ADL Assistance: Independent  Ambulation Assistance: Independent (mod I with 4ww)  Transfer Assistance: Independent  Active : Yes     Long Term Goals  Time Frame for Long Term Goals :  In one week:  Long Term Goal 1: Pt will complete all bed mobility with minAx2  Long Term Goal 2: Pt will complete sit <> stand transfers with minAx1  Long Term Goal 3: Pt will complete bed <> chair transfers with minAx1  Long Term Goal 4: Pt will ambulate 10 feet with modAx2 with LRAD  Long Term Goal 5: Pt will independently complete 3 sets of 10 reps of BLE AROM exercises       Electronically signed by:    Amber Jensen PTA  11/26/2023, 8:08 AM

## 2023-11-26 NOTE — PROGRESS NOTES
Sheela Alva is a 80 y.o. male patient. 1. Fall, initial encounter    2. Closed fracture of neck of right femur, initial encounter ([address])    3. Laceration of shin    4. Closed head injury, initial encounter    5. PAF (paroxysmal atrial fibrillation) ([address])    6. Coronary artery disease of native artery of native heart with stable angina pectoris ([address])    7. Bilateral carotid artery stenosis    8. Essential hypertension    9. Pacemaker      Past Medical History:   Diagnosis Date    Arthritis     CAD (coronary artery disease)     H/O Doppler carotid ultrasound 01/30/2020    Mod -Severe disease Right ICA, Mild disease Left ICA. H/O echocardiogram 01/30/2020    EF 55-60%, MOD AS, Mild: PHTN, MR, TR & AR. Aortic root 3.8 cm. Hx of Doppler echocardiogram 02/25/2022    EF 50-55% Mod LV hypertrophy. Mildly dialted LA. Heavily calcified aortic valve iwht mod to severe AS. Mitral annular calcification. Mild TR and MR. Mod AR. Dilated aortic root. Hyperlipidemia     Hypertension     Spinal stenosis      No past surgical history pertinent negatives on file. Scheduled Meds:   enoxaparin  30 mg SubCUTAneous Daily    sodium chloride flush  5-40 mL IntraVENous 2 times per day    sodium chloride flush  5-40 mL IntraVENous 2 times per day    aspirin  81 mg Oral Daily    ezetimibe  10 mg Oral Nightly    furosemide  40 mg Oral Daily    metoprolol succinate  12.5 mg Oral Daily    rOPINIRole  0.75 mg Oral Nightly     Continuous Infusions:   sodium chloride      sodium chloride       PRN Meds:cyclobenzaprine, sodium chloride flush, sodium chloride, HYDROmorphone, sodium chloride flush, sodium chloride, ondansetron **OR** ondansetron, polyethylene glycol, acetaminophen **OR** acetaminophen, bisacodyl, docusate sodium, oxyCODONE-acetaminophen, traZODone    Allergies   Allergen Reactions    Adhesive Tape     Adhesive Tape     Diatrizoate      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     Penicillins     Statins      Leg

## 2023-11-27 ENCOUNTER — APPOINTMENT (OUTPATIENT)
Dept: ULTRASOUND IMAGING | Age: 88
End: 2023-11-27
Payer: MEDICARE

## 2023-11-27 LAB
BASOPHILS ABSOLUTE: 0.1 K/CU MM
BASOPHILS RELATIVE PERCENT: 1.1 % (ref 0–1)
DIFFERENTIAL TYPE: ABNORMAL
EOSINOPHILS ABSOLUTE: 0.6 K/CU MM
EOSINOPHILS RELATIVE PERCENT: 6.4 % (ref 0–3)
GLUCOSE BLD-MCNC: 138 MG/DL (ref 70–99)
HCT VFR BLD CALC: 28.9 % (ref 42–52)
HEMOGLOBIN: 8.5 GM/DL (ref 13.5–18)
IMMATURE NEUTROPHIL %: 1.8 % (ref 0–0.43)
LYMPHOCYTES ABSOLUTE: 1.1 K/CU MM
LYMPHOCYTES RELATIVE PERCENT: 12 % (ref 24–44)
MCH RBC QN AUTO: 29.2 PG (ref 27–31)
MCHC RBC AUTO-ENTMCNC: 29.4 % (ref 32–36)
MCV RBC AUTO: 99.3 FL (ref 78–100)
MONOCYTES ABSOLUTE: 1 K/CU MM
MONOCYTES RELATIVE PERCENT: 10.4 % (ref 0–4)
NUCLEATED RBC %: 0 %
PDW BLD-RTO: 15.9 % (ref 11.7–14.9)
PLATELET # BLD: 162 K/CU MM (ref 140–440)
PMV BLD AUTO: 10.1 FL (ref 7.5–11.1)
RBC # BLD: 2.91 M/CU MM (ref 4.6–6.2)
SEGMENTED NEUTROPHILS ABSOLUTE COUNT: 6.4 K/CU MM
SEGMENTED NEUTROPHILS RELATIVE PERCENT: 68.3 % (ref 36–66)
TOTAL IMMATURE NEUTOROPHIL: 0.17 K/CU MM
TOTAL NUCLEATED RBC: 0 K/CU MM
WBC # BLD: 9.3 K/CU MM (ref 4–10.5)

## 2023-11-27 PROCEDURE — 97530 THERAPEUTIC ACTIVITIES: CPT

## 2023-11-27 PROCEDURE — 97110 THERAPEUTIC EXERCISES: CPT

## 2023-11-27 PROCEDURE — 85025 COMPLETE CBC W/AUTO DIFF WBC: CPT

## 2023-11-27 PROCEDURE — 2580000003 HC RX 258: Performed by: ORTHOPAEDIC SURGERY

## 2023-11-27 PROCEDURE — 97112 NEUROMUSCULAR REEDUCATION: CPT

## 2023-11-27 PROCEDURE — 94761 N-INVAS EAR/PLS OXIMETRY MLT: CPT

## 2023-11-27 PROCEDURE — 6370000000 HC RX 637 (ALT 250 FOR IP): Performed by: ORTHOPAEDIC SURGERY

## 2023-11-27 PROCEDURE — 1200000000 HC SEMI PRIVATE

## 2023-11-27 PROCEDURE — 6360000002 HC RX W HCPCS: Performed by: ORTHOPAEDIC SURGERY

## 2023-11-27 PROCEDURE — 82962 GLUCOSE BLOOD TEST: CPT

## 2023-11-27 PROCEDURE — 36415 COLL VENOUS BLD VENIPUNCTURE: CPT

## 2023-11-27 PROCEDURE — 6360000002 HC RX W HCPCS: Performed by: STUDENT IN AN ORGANIZED HEALTH CARE EDUCATION/TRAINING PROGRAM

## 2023-11-27 PROCEDURE — 93971 EXTREMITY STUDY: CPT

## 2023-11-27 PROCEDURE — 97535 SELF CARE MNGMENT TRAINING: CPT

## 2023-11-27 PROCEDURE — 6370000000 HC RX 637 (ALT 250 FOR IP): Performed by: STUDENT IN AN ORGANIZED HEALTH CARE EDUCATION/TRAINING PROGRAM

## 2023-11-27 RX ORDER — OXYCODONE HYDROCHLORIDE AND ACETAMINOPHEN 5; 325 MG/1; MG/1
1 TABLET ORAL EVERY 4 HOURS PRN
Status: DISCONTINUED | OUTPATIENT
Start: 2023-11-27 | End: 2023-11-30

## 2023-11-27 RX ORDER — OXYCODONE HYDROCHLORIDE AND ACETAMINOPHEN 5; 325 MG/1; MG/1
1 TABLET ORAL EVERY 8 HOURS PRN
Qty: 9 TABLET | Refills: 0 | Status: CANCELLED | OUTPATIENT
Start: 2023-11-27 | End: 2023-11-30

## 2023-11-27 RX ADMIN — ROPINIROLE HYDROCHLORIDE 0.75 MG: 0.25 TABLET, FILM COATED ORAL at 21:16

## 2023-11-27 RX ADMIN — HYDROMORPHONE HYDROCHLORIDE 0.5 MG: 1 INJECTION, SOLUTION INTRAMUSCULAR; INTRAVENOUS; SUBCUTANEOUS at 22:31

## 2023-11-27 RX ADMIN — ENOXAPARIN SODIUM 30 MG: 100 INJECTION SUBCUTANEOUS at 08:07

## 2023-11-27 RX ADMIN — ASPIRIN 81 MG: 81 TABLET, CHEWABLE ORAL at 08:08

## 2023-11-27 RX ADMIN — SODIUM CHLORIDE, PRESERVATIVE FREE 10 ML: 5 INJECTION INTRAVENOUS at 08:19

## 2023-11-27 RX ADMIN — SODIUM CHLORIDE, PRESERVATIVE FREE 10 ML: 5 INJECTION INTRAVENOUS at 21:16

## 2023-11-27 RX ADMIN — CYCLOBENZAPRINE 10 MG: 10 TABLET, FILM COATED ORAL at 17:08

## 2023-11-27 RX ADMIN — OXYCODONE AND ACETAMINOPHEN 1 TABLET: 5; 325 TABLET ORAL at 08:08

## 2023-11-27 RX ADMIN — SODIUM CHLORIDE, PRESERVATIVE FREE 10 ML: 5 INJECTION INTRAVENOUS at 08:18

## 2023-11-27 RX ADMIN — TRAZODONE HYDROCHLORIDE 50 MG: 50 TABLET ORAL at 21:15

## 2023-11-27 RX ADMIN — OXYCODONE AND ACETAMINOPHEN 1 TABLET: 5; 325 TABLET ORAL at 12:17

## 2023-11-27 RX ADMIN — CYCLOBENZAPRINE 10 MG: 10 TABLET, FILM COATED ORAL at 12:16

## 2023-11-27 RX ADMIN — OXYCODONE AND ACETAMINOPHEN 1 TABLET: 5; 325 TABLET ORAL at 17:08

## 2023-11-27 RX ADMIN — CYCLOBENZAPRINE 10 MG: 10 TABLET, FILM COATED ORAL at 08:08

## 2023-11-27 RX ADMIN — EZETIMIBE 10 MG: 10 TABLET ORAL at 21:16

## 2023-11-27 RX ADMIN — HYDROMORPHONE HYDROCHLORIDE 0.5 MG: 1 INJECTION, SOLUTION INTRAMUSCULAR; INTRAVENOUS; SUBCUTANEOUS at 08:06

## 2023-11-27 RX ADMIN — SODIUM CHLORIDE, PRESERVATIVE FREE 10 ML: 5 INJECTION INTRAVENOUS at 22:32

## 2023-11-27 RX ADMIN — ACETAMINOPHEN 650 MG: 325 TABLET ORAL at 08:08

## 2023-11-27 RX ADMIN — METOPROLOL SUCCINATE 12.5 MG: 25 TABLET, EXTENDED RELEASE ORAL at 08:08

## 2023-11-27 RX ADMIN — HYDROMORPHONE HYDROCHLORIDE 0.5 MG: 1 INJECTION, SOLUTION INTRAMUSCULAR; INTRAVENOUS; SUBCUTANEOUS at 15:07

## 2023-11-27 RX ADMIN — FUROSEMIDE 40 MG: 40 TABLET ORAL at 08:08

## 2023-11-27 ASSESSMENT — PAIN DESCRIPTION - DIRECTION: RADIATING_TOWARDS: KNEE

## 2023-11-27 ASSESSMENT — PAIN SCALES - GENERAL
PAINLEVEL_OUTOF10: 8
PAINLEVEL_OUTOF10: 10
PAINLEVEL_OUTOF10: 10
PAINLEVEL_OUTOF10: 0
PAINLEVEL_OUTOF10: 10
PAINLEVEL_OUTOF10: 10
PAINLEVEL_OUTOF10: 7
PAINLEVEL_OUTOF10: 0

## 2023-11-27 ASSESSMENT — PAIN DESCRIPTION - PAIN TYPE: TYPE: SURGICAL PAIN

## 2023-11-27 ASSESSMENT — PAIN DESCRIPTION - ORIENTATION
ORIENTATION: RIGHT

## 2023-11-27 ASSESSMENT — PAIN DESCRIPTION - LOCATION
LOCATION: HIP
LOCATION: LEG
LOCATION: HIP
LOCATION: LEG
LOCATION: HIP

## 2023-11-27 ASSESSMENT — PAIN - FUNCTIONAL ASSESSMENT: PAIN_FUNCTIONAL_ASSESSMENT: ACTIVITIES ARE NOT PREVENTED

## 2023-11-27 ASSESSMENT — PAIN DESCRIPTION - DESCRIPTORS
DESCRIPTORS: ACHING

## 2023-11-27 ASSESSMENT — PAIN DESCRIPTION - FREQUENCY: FREQUENCY: CONTINUOUS

## 2023-11-27 ASSESSMENT — PAIN DESCRIPTION - ONSET: ONSET: ON-GOING

## 2023-11-27 NOTE — PROGRESS NOTES
Physical Therapy  Name: Deanne Burciaga MRN: [id number] :   1926   Date:  2023   Admission Date: 2023 Room:  [address] [place] [address]   Restrictions/Precautions:  Restrictions/Precautions  Restrictions/Precautions: General Precautions, Fall Risk, Weight Bearing Lower Extremity Weight Bearing Restrictions  Right Lower Extremity Weight Bearing: Weight Bearing As Tolerated       Recommended discharge to SNF from [facility] on     Communication with other providers:  Pedro Amor RN states pt is ok to see for therapy, co-treat with OT for pt safety and assist level  Subjective:  Patient states:  he is very stiff today  Pain:   Location, Type, Intensity (0/10 to 10/10):  9/10 R hip  Objective:    Observation:  pt was in the bed  Treatment, including education/measures:  Supine Exercises: Ankle pumps x 10  Glute sets x 10  Heel slides x 10 with assist  Hip abd x 10 with assist  Transfers with line management of pur-wic, tele  Supine to sit :max A of 2 with the leg   Scooting :max A of 2  Pt sat on the EOB doing WS's and ex to ease the pain for 25 minutes, pt also sat leaning to the left in order to decrease the weight on his R side d/t pain  Sit to stand : to RW max A of 2 for 3 stands  Stand to sit : from RW max A of 2 for 3 sits  Squat PT:mod A of 2  Sitting Exercises: in the chair  LAQ's x 20  Therapeutic Exercise:  Therapeutic exercises were instructed today. Cues were given for technique, safety, recruitment, and rationale. Cues were verbal and/or tactile. Safety  Patient left safely in the chair, with call light/phone in reach with no alarm applied. Gait belt was used for transfers and gait.   Assessment / Impression:     Patient's tolerance of treatment:  good, pt worked hard and is highly motivated   Adverse Reaction: increased pain with WB'ing  Significant change in status and impact:  progressing  Barriers to improvement:  pain, limited mobility  Plan for Next Session:    Will cont to work towards pt's goals per his tolerance  Time in:  0940  Time out:  1034  Timed treatment minutes:  54  Total treatment time:  47  Previously filed items:  Social/Functional History  Lives With: Spouse  Type of Home: Condo  Home Layout: One level  Home Access: Level entry  Bathroom Shower/Tub: Walk-in shower  Bathroom Toilet: Handicap height  Bathroom Equipment: Grab bars in shower, Shower chair  Home Equipment: Derick Weldon, 4 wheeled  ADL Assistance: Independent  Ambulation Assistance: Independent (mod I with 4ww)  Transfer Assistance: Independent  Active : Yes     Long Term Goals  Time Frame for Long Term Goals : In one week:  Long Term Goal 1: Pt will complete all bed mobility with minAx2  Long Term Goal 2: Pt will complete sit <> stand transfers with minAx1  Long Term Goal 3: Pt will complete bed <> chair transfers with minAx1  Long Term Goal 4: Pt will ambulate 10 feet with modAx2 with LRAD  Long Term Goal 5: Pt will independently complete 3 sets of 10 reps of BLE AROM exercises  Electronically signed by:     Dayan Baugh PTA  11/27/2023, 10:54 AM

## 2023-11-27 NOTE — PROGRESS NOTES
V2.0    [facility] [place]      Name:  Sheela Alva /Age/Sex: 1926  (80 y.o. male)   MRN & CSN:  [id number] & [id number] Encounter Date/Time: 2023 1:35 PM EST   Location:  [facility]1113-A PCP: Damina Toney MD     Attending:Tracy Cohn MD       Hospital Day: 5    Assessment and Recommendations   Sheela Alva is a 80 y.o. male with pmh of CAD,PAF not on AC,AVS, bilateral carotid artery stenosis, history of pacemaker, CKD stage III, chronic back pain, restless leg syndrome, insomnia who presents with Intertrochanteric fracture of right femur, closed, initial encounter ([address])    # Acute comminuted, displaced and angulated right proximal femur intertrochanteric fracture s/p closed reduction and intramedullary nail : Presented with history of fall, x-ray right hip showed fracture, kept n.p.o., consulted Ortho, taken to the OR on  and performed closed reduction and intramedullary nail fixation. Continue as needed pain medication and follow Ortho recommendations postop. PT/OT evaluated and recommended SNF      # Left upper extremity swelling rule out DVT: Noticed left forearm swelling which is firm with some complaint of pain, will obtain Doppler ultrasound to rule out DVT continue to monitor. # Acute on Chronic anemia suspected postop: continue to monitor CBC. # Fall: CT head, CT C-spine-no acute process. PT/OT evaluation when appropriate. # Coronary artery disease:  On Aspirin, metoprolol succinate, ezetimibe, allergic to statins, Cardiologist-Dr. Gudelia Bradshaw     # Paroxysmal atrial fibrillation  -Not on anticoagulation     # Aortic valve stenosis  -As per cardiology recommendation-patient is given advanced age-conservative management     # Bilateral carotid artery stenosis     # History of pacemaker  -Generator change-2022     # CKD stage III:-Follows with Dr. Faustino Qureshi     # Chronic back pain: Electric scooter, Patient on Percocet     # Restless leg syndrome-on 143   K 4.4 4.4    106   CO2 27 26   BUN 38* 39*   CREATININE 2.1* 2.2*   GLUCOSE 161* 121*     Hepatic:   No results for input(s): \"AST\", \"ALT\", \"ALB\", \"BILITOT\", \"ALKPHOS\" in the last 72 hours. Lipids:   Lab Results   Component Value Date/Time    CHOL 181 12/09/2021 12:00 AM    HDL 54 12/09/2021 12:00 AM    TRIG 181 12/09/2021 12:00 AM     Hemoglobin A1C: No results found for: \"LABA1C\"  TSH: No results found for: \"TSH\"  Troponin:   Lab Results   Component Value Date/Time    TROPONINT <0.010 08/27/2021 01:00 PM     Lactic Acid: No results for input(s): \"LACTA\" in the last 72 hours. BNP: No results for input(s): \"PROBNP\" in the last 72 hours.   UA:  Lab Results   Component Value Date/Time    NITRU NEGATIVE 11/23/2023 10:02 PM    NITRU Negative 04/06/2022 02:11 PM    COLORU YELLOW 11/23/2023 10:02 PM    PHUR 6.0 04/06/2022 02:11 PM    LABCAST None seen 04/06/2022 02:11 PM    LABCAST CANCELED 04/06/2022 02:11 PM    WBCUA 5 11/23/2023 10:02 PM    RBCUA 1 11/23/2023 10:02 PM    MUCUS RARE 11/23/2023 10:02 PM    TRICHOMONAS NONE SEEN 11/23/2023 10:02 PM    YEAST CANCELED 04/06/2022 02:11 PM    BACTERIA NEGATIVE 11/23/2023 10:02 PM    CLARITYU CLEAR 11/23/2023 10:02 PM    SPECGRAV 1.010 11/23/2023 10:02 PM    LEUKOCYTESUR TRACE 11/23/2023 10:02 PM    UROBILINOGEN 0.2 11/23/2023 10:02 PM    BILIRUBINUR NEGATIVE 11/23/2023 10:02 PM    BLOODU NEGATIVE 11/23/2023 10:02 PM    GLUCOSEU Negative 04/06/2022 02:11 PM    KETUA NEGATIVE 11/23/2023 10:02 PM     Urine Cultures: No results found for: \"LABURIN\"  Blood Cultures: No results found for: \"BC\"  No results found for: \"BLOODCULT2\"  Organism: No results found for: \"ORG\"      Electronically signed by Cristina Raymundo MD on 11/27/2023 at 11:56 AM

## 2023-11-27 NOTE — PROGRESS NOTES
Occupational Therapy  . Occupational Therapy Treatment Note    Name: Bertram Chavez MRN: [id number] :   1926   Date:  2023   Admission Date: 2023 Room:  [address] [place] [address]-A     Primary Problem:  The primary encounter diagnosis was Fall, initial encounter. Diagnoses of Closed fracture of neck of right femur, initial encounter ([address]), Laceration of shin, Closed head injury, initial encounter, PAF (paroxysmal atrial fibrillation) ([address]), Coronary artery disease of native artery of native heart with stable angina pectoris ([address]), Bilateral carotid artery stenosis, Essential hypertension, and Pacemaker were also pertinent to this visit. Restrictions/Precautions:  Restrictions/Precautions  Restrictions/Precautions: General Precautions, Fall Risk, Weight Bearing   Lower Extremity Weight Bearing Restrictions  Right Lower Extremity Weight Bearing: Weight Bearing As Tolerated       Communication with other providers: Per chart review, patient is appropriate for therapeutic intervention. Co-Tx c PTA Catrina for simultaneous skilled therapeutic intervention for this pt c decreased strength / endurance / safety. Nurse Jc Lopez reports having dosed patient prior to Tx session c pain meds. Uprated nurse for use of squat pivot transfer w/o AD c two person assist as pt remains very painful and had limited standing tolerance this date. Subjective:  Patient states:  \"I know I need to do what I can. \" Pt reports feeling very stiff and painful. Pain:   Location, Type, Intensity (0/10 to 10/10):  9/10, R hip    Objective:    Observation: Pt received in semi-fowlers, A&O to self / situation, actively participated c increased time and effort. Pt noted to engage in weight shifting gently while sitting EOB and to sit in upright posture. During stands, pt exhibited increased s/s of pain behaviors, exhibited difficulty achieving  / maintaining trunk extension for full stands.     Objective Measures:  Telemetry, gauze / ACE wrap mobility. Assessment / Impression:    Patient's tolerance of treatment: Well - appears highly motivated  Adverse Reaction: None  Significant change in status and impact: Progressing  Barriers to improvement: Pain, decreased endurance      Plan for Next Session:    Continue per OT POC per patient's tolerance c emphasis on standing tolerance c full trunk extension and side steps along edge of bed c progression to stand pivot transfer training c RW. Time in:  0940  Time out:  1034  Timed treatment minutes:  54  Total treatment time:  54      Electronically signed by:    SHERYL Turner  11/27/2023, 9:21 AM    Goals:  Pt will complete all aspects of bed mobility for EOB/OOB ADLs w/ min A. Pt will complete UB ADLs w/ SBA. Pt will complete LB ADLs w/ min A. Pt will complete all functional transfers to and from bed, chair, toilet, shower chair w/ CGA. Pt will ambulate functional household distance w/ CGA. Pt will complete all aspects of toileting task w/ min A. Pt will perform therex/theract in order to increase strength and functional activity tolerance necessary for increased independence w/ ADL routine.

## 2023-11-27 NOTE — PROGRESS NOTES
Patient has some swelling to the left arm. This is new. Not sure the cause but adding it to the [facility] and let the primary doc know.

## 2023-11-27 NOTE — CARE COORDINATION
Patient has been approved to go to [facility]. They can accept patient today. Pt will be going to [address]. Patient will need rapid covid and EARNESTINE completed. PS to provider. Discharging nurse please complete the following :    **Upon discharge patient will be going to   [facility]    * Call facility to advise patient is ready to be discharged. *Please notify family. * Please fax  H/P,Scripts, AVS and  Completed EARNESTINE to [phone]  *Please call report to [phone]    **Needs Completed EARNESTINE and Rapid COVID     If setting up with [facility].  [phone]

## 2023-11-28 LAB
ANION GAP SERPL CALCULATED.3IONS-SCNC: 9 MMOL/L (ref 4–16)
ANISOCYTOSIS: ABNORMAL
BANDED NEUTROPHILS ABSOLUTE COUNT: 0.7 K/CU MM
BANDED NEUTROPHILS RELATIVE PERCENT: 9 % (ref 5–11)
BASOPHILS ABSOLUTE: 0.1 K/CU MM
BASOPHILS RELATIVE PERCENT: 1 % (ref 0–1)
BUN SERPL-MCNC: 55 MG/DL (ref 6–23)
CALCIUM SERPL-MCNC: 7.8 MG/DL (ref 8.3–10.6)
CHLORIDE BLD-SCNC: 99 MMOL/L (ref 99–110)
CO2: 28 MMOL/L (ref 21–32)
CREAT SERPL-MCNC: 2.5 MG/DL (ref 0.9–1.3)
DIFFERENTIAL TYPE: ABNORMAL
EOSINOPHILS ABSOLUTE: 0.7 K/CU MM
EOSINOPHILS RELATIVE PERCENT: 9 % (ref 0–3)
GFR SERPL CREATININE-BSD FRML MDRD: 23 ML/MIN/1.73M2
GLUCOSE SERPL-MCNC: 121 MG/DL (ref 70–99)
HCT VFR BLD CALC: 25.7 % (ref 42–52)
HEMOGLOBIN: 7.7 GM/DL (ref 13.5–18)
LYMPHOCYTES ABSOLUTE: 1.5 K/CU MM
LYMPHOCYTES RELATIVE PERCENT: 19 % (ref 24–44)
MCH RBC QN AUTO: 28.7 PG (ref 27–31)
MCHC RBC AUTO-ENTMCNC: 30 % (ref 32–36)
MCV RBC AUTO: 95.9 FL (ref 78–100)
METAMYELOCYTES ABSOLUTE COUNT: 0.23 K/CU MM
METAMYELOCYTES PERCENT: 3 %
MONOCYTES ABSOLUTE: 0.8 K/CU MM
MONOCYTES RELATIVE PERCENT: 10 % (ref 0–4)
PDW BLD-RTO: 15.8 % (ref 11.7–14.9)
PLATELET # BLD: 171 K/CU MM (ref 140–440)
PMV BLD AUTO: 9.9 FL (ref 7.5–11.1)
POTASSIUM SERPL-SCNC: 4 MMOL/L (ref 3.5–5.1)
RBC # BLD: 2.68 M/CU MM (ref 4.6–6.2)
RBC # BLD: ABNORMAL 10*6/UL
SEGMENTED NEUTROPHILS ABSOLUTE COUNT: 3.8 K/CU MM
SEGMENTED NEUTROPHILS RELATIVE PERCENT: 49 % (ref 36–66)
SODIUM BLD-SCNC: 136 MMOL/L (ref 135–145)
WBC # BLD: 7.8 K/CU MM (ref 4–10.5)

## 2023-11-28 PROCEDURE — 85027 COMPLETE CBC AUTOMATED: CPT

## 2023-11-28 PROCEDURE — 2700000000 HC OXYGEN THERAPY PER DAY

## 2023-11-28 PROCEDURE — 89220 SPUTUM SPECIMEN COLLECTION: CPT

## 2023-11-28 PROCEDURE — 6360000002 HC RX W HCPCS: Performed by: ORTHOPAEDIC SURGERY

## 2023-11-28 PROCEDURE — 1200000000 HC SEMI PRIVATE

## 2023-11-28 PROCEDURE — 6370000000 HC RX 637 (ALT 250 FOR IP): Performed by: STUDENT IN AN ORGANIZED HEALTH CARE EDUCATION/TRAINING PROGRAM

## 2023-11-28 PROCEDURE — 6370000000 HC RX 637 (ALT 250 FOR IP): Performed by: ORTHOPAEDIC SURGERY

## 2023-11-28 PROCEDURE — 94150 VITAL CAPACITY TEST: CPT

## 2023-11-28 PROCEDURE — 2580000003 HC RX 258: Performed by: ORTHOPAEDIC SURGERY

## 2023-11-28 PROCEDURE — 36415 COLL VENOUS BLD VENIPUNCTURE: CPT

## 2023-11-28 PROCEDURE — 94761 N-INVAS EAR/PLS OXIMETRY MLT: CPT

## 2023-11-28 PROCEDURE — 6360000002 HC RX W HCPCS: Performed by: STUDENT IN AN ORGANIZED HEALTH CARE EDUCATION/TRAINING PROGRAM

## 2023-11-28 PROCEDURE — 97535 SELF CARE MNGMENT TRAINING: CPT

## 2023-11-28 PROCEDURE — 85007 BL SMEAR W/DIFF WBC COUNT: CPT

## 2023-11-28 PROCEDURE — 80048 BASIC METABOLIC PNL TOTAL CA: CPT

## 2023-11-28 PROCEDURE — 94664 DEMO&/EVAL PT USE INHALER: CPT

## 2023-11-28 PROCEDURE — 97530 THERAPEUTIC ACTIVITIES: CPT

## 2023-11-28 RX ADMIN — HYDROMORPHONE HYDROCHLORIDE 0.5 MG: 1 INJECTION, SOLUTION INTRAMUSCULAR; INTRAVENOUS; SUBCUTANEOUS at 14:48

## 2023-11-28 RX ADMIN — ASPIRIN 81 MG: 81 TABLET, CHEWABLE ORAL at 09:25

## 2023-11-28 RX ADMIN — ENOXAPARIN SODIUM 30 MG: 100 INJECTION SUBCUTANEOUS at 09:26

## 2023-11-28 RX ADMIN — SODIUM CHLORIDE, PRESERVATIVE FREE 10 ML: 5 INJECTION INTRAVENOUS at 20:44

## 2023-11-28 RX ADMIN — HYDROMORPHONE HYDROCHLORIDE 0.5 MG: 1 INJECTION, SOLUTION INTRAMUSCULAR; INTRAVENOUS; SUBCUTANEOUS at 18:36

## 2023-11-28 RX ADMIN — CYCLOBENZAPRINE 10 MG: 10 TABLET, FILM COATED ORAL at 20:41

## 2023-11-28 RX ADMIN — CYCLOBENZAPRINE 10 MG: 10 TABLET, FILM COATED ORAL at 12:50

## 2023-11-28 RX ADMIN — ROPINIROLE HYDROCHLORIDE 0.75 MG: 0.25 TABLET, FILM COATED ORAL at 20:41

## 2023-11-28 RX ADMIN — METOPROLOL SUCCINATE 12.5 MG: 25 TABLET, EXTENDED RELEASE ORAL at 09:25

## 2023-11-28 RX ADMIN — HYDROMORPHONE HYDROCHLORIDE 0.5 MG: 1 INJECTION, SOLUTION INTRAMUSCULAR; INTRAVENOUS; SUBCUTANEOUS at 08:30

## 2023-11-28 RX ADMIN — EZETIMIBE 10 MG: 10 TABLET ORAL at 20:41

## 2023-11-28 RX ADMIN — HYDROMORPHONE HYDROCHLORIDE 0.5 MG: 1 INJECTION, SOLUTION INTRAMUSCULAR; INTRAVENOUS; SUBCUTANEOUS at 22:59

## 2023-11-28 RX ADMIN — SODIUM CHLORIDE, PRESERVATIVE FREE 10 ML: 5 INJECTION INTRAVENOUS at 09:27

## 2023-11-28 RX ADMIN — DOCUSATE SODIUM 100 MG: 100 CAPSULE, LIQUID FILLED ORAL at 11:06

## 2023-11-28 RX ADMIN — FUROSEMIDE 40 MG: 40 TABLET ORAL at 09:26

## 2023-11-28 RX ADMIN — SODIUM CHLORIDE, PRESERVATIVE FREE 10 ML: 5 INJECTION INTRAVENOUS at 20:45

## 2023-11-28 ASSESSMENT — PAIN DESCRIPTION - ORIENTATION
ORIENTATION: RIGHT

## 2023-11-28 ASSESSMENT — PAIN DESCRIPTION - LOCATION
LOCATION: LEG
LOCATION: HIP

## 2023-11-28 ASSESSMENT — PAIN DESCRIPTION - DESCRIPTORS
DESCRIPTORS: ACHING;THROBBING;SHARP;SHOOTING
DESCRIPTORS: ACHING;THROBBING

## 2023-11-28 ASSESSMENT — PAIN SCALES - GENERAL
PAINLEVEL_OUTOF10: 3
PAINLEVEL_OUTOF10: 10
PAINLEVEL_OUTOF10: 0
PAINLEVEL_OUTOF10: 8
PAINLEVEL_OUTOF10: 0
PAINLEVEL_OUTOF10: 8
PAINLEVEL_OUTOF10: 10

## 2023-11-28 ASSESSMENT — PAIN - FUNCTIONAL ASSESSMENT
PAIN_FUNCTIONAL_ASSESSMENT: ACTIVITIES ARE NOT PREVENTED

## 2023-11-28 ASSESSMENT — PAIN SCALES - WONG BAKER: WONGBAKER_NUMERICALRESPONSE: 0

## 2023-11-28 NOTE — PROGRESS NOTES
Occupational Therapy    Occupational Therapy Treatment Note    Name: Kiana Montague MRN: [id number] :   1926   Date:  2023   Admission Date: 2023 Room:  [address] [place] [address]-     D/c recommendation:  SNF    Restrictions/Precautions:  Restrictions/Precautions  Restrictions/Precautions: General Precautions, Fall Risk, Weight Bearing   Lower Extremity Weight Bearing Restrictions  Right Lower Extremity Weight Bearing: Weight Bearing As Tolerated       Communication with other providers: RN cleared pt for therapy, CoTx with PTA Livier for safety and activity tolerance    Subjective:  Patient states:  [place]uh[place] Vas[place] are you girls from? \"  Pain:   Location, Type, Intensity (0/10 to 10/10):  endorses pain in R hip/shin but does not rate    Objective:    Observation: pt supine in bed upon arrival, agreeable to therapy   Objective Measures:  room air    Treatment, including education:  Self Care Training:   Cues were given for safety, sequence, UE/LE placement, visual cues, and balance.       Supine to EOB transfer using leg  with mod A x 2 for trunk elevation, LE maneuvering and hip pivot, HOB elevated and use of bed rails, min verbal cues for sequencing    Sit to stand x 2 from EOB with mod A x 2 for initial, max A x 2 for following STS, for force production and balance safety, mod verbal cues for transfer technique and for knee extension    Squat pivot transfer with max A x 2 for force production, balance safety, and hip pivot, mod verbal cues for transfer technique    Donned and doffed depends with max A for lacing though feet and pulling past hips, additional max A x 1 from PTA to maintain standing position when donning    Toilet hygiene with max A to reach and an additional max A to maintain standing    Brushed hair while sitting EOB with SBA for balance safety    Stood for 1 min + 30 sec with max A to maintain upright position using 2ww     Donned socks with max A to reach feet, pt able to maintain elevation    Pt

## 2023-11-28 NOTE — PROGRESS NOTES
Physical Therapy Treatment Note  Name: Yohan Arroyo MRN: [id number] :   1926   Date:  2023   Admission Date: 2023 Room:  [address] [place] [address]   Restrictions/Precautions:  Restrictions/Precautions  Restrictions/Precautions: General Precautions, Fall Risk, Weight Bearing   Lower Extremity Weight Bearing Restrictions  Right Lower Extremity Weight Bearing: Weight Bearing As Tolerated    Communication with other providers:  Pt okay to see for therapy per RN, CoTx with OT Elisha Price for pt safety. Subjective:  Patient states: \"This is what I should've been doing all day\"   Pain:   Location, Type, Intensity (0/10 to 10/10): Expresses pn, does not rate. Objective:    Observation:  Pt supine in bed upon PTA arrival.   Objective Measures:  Tele, stable. Treatment, including education/measures:    Therapeutic Activity Training:   Therapeutic activity training was instructed today. Cues were given for safety, sequence, UE/LE placement, awareness, and balance. Activities performed today included bed mobility training, sup-sit, sit-stand, SPT. Mobility:  Sup > sit: Mod A x 2, pt is able to partially advance LEs toward EOB but requires assist for remaining half of transfer. Scooting: Mod A x 1 at hips. STS: Mod A x 2 first STS, pt standing for ~2 min before requiring to return to bed. Pt performed second STS from EOB Max A x 2 with raised bed, pt demos knee buckling with second stand. SPT: Max A x 2 at gait belt and hips to guide safety to chair. Pt tolerated well with min increase in pain. Safety:   Pt left safely in recliner with chair alarm activated, call light in reach, all needs met. Assessment / Impression:    Pt tolerated OOB activities well this date with min-mod increase in pain and enthusiastic to participate.    Patient's tolerance of treatment:  Good   Adverse Reaction: none  Significant change in status and impact:  none  Barriers to improvement:  none  Plan for Next Session:
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
## 2023-11-29 LAB
ANION GAP SERPL CALCULATED.3IONS-SCNC: 15 MMOL/L (ref 4–16)
BUN SERPL-MCNC: 56 MG/DL (ref 6–23)
CALCIUM SERPL-MCNC: 8.5 MG/DL (ref 8.3–10.6)
CHLORIDE BLD-SCNC: 100 MMOL/L (ref 99–110)
CO2: 24 MMOL/L (ref 21–32)
CREAT SERPL-MCNC: 2.3 MG/DL (ref 0.9–1.3)
GFR SERPL CREATININE-BSD FRML MDRD: 25 ML/MIN/1.73M2
GLUCOSE SERPL-MCNC: 125 MG/DL (ref 70–99)
HCT VFR BLD CALC: 28.9 % (ref 42–52)
HEMOGLOBIN: 8.7 GM/DL (ref 13.5–18)
MCH RBC QN AUTO: 28.7 PG (ref 27–31)
MCHC RBC AUTO-ENTMCNC: 30.1 % (ref 32–36)
MCV RBC AUTO: 95.4 FL (ref 78–100)
PDW BLD-RTO: 15.9 % (ref 11.7–14.9)
PLATELET # BLD: 226 K/CU MM (ref 140–440)
PMV BLD AUTO: 9.9 FL (ref 7.5–11.1)
POTASSIUM SERPL-SCNC: 4.3 MMOL/L (ref 3.5–5.1)
RBC # BLD: 3.03 M/CU MM (ref 4.6–6.2)
SODIUM BLD-SCNC: 139 MMOL/L (ref 135–145)
WBC # BLD: 9.5 K/CU MM (ref 4–10.5)

## 2023-11-29 PROCEDURE — 6370000000 HC RX 637 (ALT 250 FOR IP): Performed by: STUDENT IN AN ORGANIZED HEALTH CARE EDUCATION/TRAINING PROGRAM

## 2023-11-29 PROCEDURE — 6360000002 HC RX W HCPCS: Performed by: STUDENT IN AN ORGANIZED HEALTH CARE EDUCATION/TRAINING PROGRAM

## 2023-11-29 PROCEDURE — 36415 COLL VENOUS BLD VENIPUNCTURE: CPT

## 2023-11-29 PROCEDURE — 85027 COMPLETE CBC AUTOMATED: CPT

## 2023-11-29 PROCEDURE — 2580000003 HC RX 258: Performed by: ORTHOPAEDIC SURGERY

## 2023-11-29 PROCEDURE — 1200000000 HC SEMI PRIVATE

## 2023-11-29 PROCEDURE — 6370000000 HC RX 637 (ALT 250 FOR IP): Performed by: ORTHOPAEDIC SURGERY

## 2023-11-29 PROCEDURE — 94761 N-INVAS EAR/PLS OXIMETRY MLT: CPT

## 2023-11-29 PROCEDURE — 80048 BASIC METABOLIC PNL TOTAL CA: CPT

## 2023-11-29 PROCEDURE — 6360000002 HC RX W HCPCS: Performed by: ORTHOPAEDIC SURGERY

## 2023-11-29 PROCEDURE — 97530 THERAPEUTIC ACTIVITIES: CPT

## 2023-11-29 PROCEDURE — 94150 VITAL CAPACITY TEST: CPT

## 2023-11-29 RX ADMIN — HYDROMORPHONE HYDROCHLORIDE 0.5 MG: 1 INJECTION, SOLUTION INTRAMUSCULAR; INTRAVENOUS; SUBCUTANEOUS at 14:50

## 2023-11-29 RX ADMIN — HYDROMORPHONE HYDROCHLORIDE 0.5 MG: 1 INJECTION, SOLUTION INTRAMUSCULAR; INTRAVENOUS; SUBCUTANEOUS at 04:38

## 2023-11-29 RX ADMIN — ASPIRIN 81 MG: 81 TABLET, CHEWABLE ORAL at 10:31

## 2023-11-29 RX ADMIN — OXYCODONE AND ACETAMINOPHEN 1 TABLET: 5; 325 TABLET ORAL at 18:48

## 2023-11-29 RX ADMIN — CYCLOBENZAPRINE 10 MG: 10 TABLET, FILM COATED ORAL at 21:48

## 2023-11-29 RX ADMIN — FUROSEMIDE 40 MG: 40 TABLET ORAL at 10:31

## 2023-11-29 RX ADMIN — HYDROMORPHONE HYDROCHLORIDE 0.5 MG: 1 INJECTION, SOLUTION INTRAMUSCULAR; INTRAVENOUS; SUBCUTANEOUS at 10:22

## 2023-11-29 RX ADMIN — EZETIMIBE 10 MG: 10 TABLET ORAL at 21:48

## 2023-11-29 RX ADMIN — SODIUM CHLORIDE, PRESERVATIVE FREE 10 ML: 5 INJECTION INTRAVENOUS at 13:53

## 2023-11-29 RX ADMIN — METOPROLOL SUCCINATE 12.5 MG: 25 TABLET, EXTENDED RELEASE ORAL at 10:31

## 2023-11-29 RX ADMIN — SODIUM CHLORIDE, PRESERVATIVE FREE 10 ML: 5 INJECTION INTRAVENOUS at 10:32

## 2023-11-29 RX ADMIN — ROPINIROLE HYDROCHLORIDE 0.75 MG: 0.25 TABLET, FILM COATED ORAL at 21:48

## 2023-11-29 RX ADMIN — ENOXAPARIN SODIUM 30 MG: 100 INJECTION SUBCUTANEOUS at 10:30

## 2023-11-29 RX ADMIN — OXYCODONE AND ACETAMINOPHEN 1 TABLET: 5; 325 TABLET ORAL at 12:18

## 2023-11-29 RX ADMIN — OXYCODONE AND ACETAMINOPHEN 1 TABLET: 5; 325 TABLET ORAL at 08:21

## 2023-11-29 RX ADMIN — CYCLOBENZAPRINE 10 MG: 10 TABLET, FILM COATED ORAL at 13:33

## 2023-11-29 ASSESSMENT — PAIN DESCRIPTION - LOCATION
LOCATION: HIP

## 2023-11-29 ASSESSMENT — PAIN SCALES - GENERAL
PAINLEVEL_OUTOF10: 10
PAINLEVEL_OUTOF10: 10
PAINLEVEL_OUTOF10: 9
PAINLEVEL_OUTOF10: 4
PAINLEVEL_OUTOF10: 10

## 2023-11-29 ASSESSMENT — PAIN SCALES - WONG BAKER: WONGBAKER_NUMERICALRESPONSE: 0

## 2023-11-29 ASSESSMENT — PAIN DESCRIPTION - DESCRIPTORS
DESCRIPTORS: STABBING;THROBBING
DESCRIPTORS: ACHING
DESCRIPTORS: ACHING

## 2023-11-29 ASSESSMENT — PAIN DESCRIPTION - ORIENTATION
ORIENTATION: LEFT
ORIENTATION: RIGHT
ORIENTATION: RIGHT

## 2023-11-29 ASSESSMENT — PAIN - FUNCTIONAL ASSESSMENT: PAIN_FUNCTIONAL_ASSESSMENT: ACTIVITIES ARE NOT PREVENTED

## 2023-11-29 NOTE — PROGRESS NOTES
Physical Therapy Treatment Note  Name: Bertram Chavez MRN: [id number] :   1926   Date:  2023   Admission Date: 2023 Room:  [address] [place] [address]   Restrictions/Precautions:  WBAT R LE, general, fall risk   Communication with other providers:  Pt okay to see for therapy per RN   Subjective:  Patient states: \"It hurts\"   Pain:   Location, Type, Intensity (0/10 to 10/10):  10/10 pn at R hip, pt describes as sharp shooting pn.   Objective:    Observation:  Pt supine in bed upon PTA arrival.   Objective Measures:  Tele, HR in 90s. Treatment, including education/measures:    Therapeutic Activity Training:   Therapeutic activity training was instructed today. Cues were given for safety, sequence, UE/LE placement, awareness, and balance. Activities performed today included bed mobility training, sup-sit, sit-stand. Mobility:  Sup > sit: Max A x 1 at hips and HHA for trunk positioning. Mod A at EOB initially to maintain sitting, progressing to SBA with foot and hand placement. Pt reporting intermittent shooting pain in hip. Scooting: Mod A at hips. STS: CGA to stedy from EOB with increased time and effort. Pt stood for ~3 min before returning to sitting on stedy paddles. Pt performed x3 more STS from stedy paddles with CGA and maintaining ea. Stand for ~1min. Pt Mod A to return to sit in recliner. Pt performed STS from recliner with use of bedrail for UE assistance CGA with increased time and effort, performed to place tg for pt and staff safety, barrier between pt and tg provided for skin integrity. SPT: NT    Safety:   Pt returned safely to recliner with chair alarm activated, call light in reach, all needs met. Assessment / Impression:    Pt demos improved performance of functional activities this date despite pain, pt remains motivated to participate in therapy.    Patient's tolerance of treatment:  Good   Adverse Reaction: Pain  Significant change in status and impact:  none  Barriers to improvement:  Pain   Plan for Next Session:    Plan to continue OOB activities, trial ambulation as appropriate. Time in:  1335  Time out:  1409  Timed treatment minutes:  30  Total treatment time:  34    Previously filed items:  Social/Functional History  Lives With: Spouse  Type of Home: Condo  Home Layout: One level  Home Access: Level entry  Bathroom Shower/Tub: Walk-in shower  Bathroom Toilet: Handicap height  Bathroom Equipment: Grab bars in shower, Shower chair  Home Equipment: Maura Dorsey, 4 wheeled  ADL Assistance: Independent  Ambulation Assistance: Independent (mod I with 4ww)  Transfer Assistance: Independent  Active : Yes     Long Term Goals  Time Frame for Long Term Goals :  In one week:  Long Term Goal 1: Pt will complete all bed mobility with minAx2  Long Term Goal 2: Pt will complete sit <> stand transfers with minAx1  Long Term Goal 3: Pt will complete bed <> chair transfers with minAx1  Long Term Goal 4: Pt will ambulate 10 feet with modAx2 with LRAD  Long Term Goal 5: Pt will independently complete 3 sets of 10 reps of BLE AROM exercises       Electronically signed by:    Ming Canales, RAMONA  11/29/2023, 2:50 PM

## 2023-11-29 NOTE — PROGRESS NOTES
Physician Progress Note      PATIENTThornhanh Handler  CSN #:                  [id number]  :                       1926  ADMIT DATE:       2023 3:14 PM  DISCH DATE:  RESPONDING  PROVIDER #:        Yumiko Sanchez MD          QUERY TEXT:    Pt admitted with right hip fracture and has acute on chronic anemia suspected   postop documented. If possible, please document in progress notes and   discharge summary further specificity regarding the type of anemia:    The medical record reflects the following:  Risk Factors: recent repair to R  hip, recent fracture to right hip  Clinical Indicators: Hgb  was 11.3. Pt had closed reduction and   intramedullary nail fixation of the right  intertrochanteric hip fracture on 23. Hgb dropped daily and was 7.7 as   of 23. PN dated  by Dr. Mckayla Fang, \"Acute on Chronic anemia   suspected postop. Baseline 11-12. Currently 7.7, thought to be op related,   will continue to monitor CBC, if still trending down, will evaluate thigh for   hematoma and obtain stool occult. Treatment: monitor H/H  Options provided:  -- Anemia due to acute blood loss  -- Anemia due to acute on chronic blood loss  -- Anemia due to postoperative blood loss  -- Other - I will add my own diagnosis  -- Disagree - Not applicable / Not valid  -- Disagree - Clinically unable to determine / Unknown  -- Refer to Clinical Documentation Reviewer    PROVIDER RESPONSE TEXT:    This patient has acute blood loss anemia.     Query created by: Yumiko Mendoza on 2023 2:30 PM      Electronically signed by:  Yumiko Sanchez MD 2023 3:24 PM

## 2023-11-29 NOTE — PROGRESS NOTES
11/29/23 1003   Encounter Summary   Encounter Overview/Reason  Follow-up   Service Provided For: Patient   Referral/Consult From: [facility]   Support System Spouse; Family members   Last Encounter  11/29/23   Complexity of Encounter Low   Begin Time 1000   End Time  1003   Total Time Calculated 3 min   Spiritual/Emotional needs   Type Spiritual Support   Assessment/Intervention/Outcome   Assessment Concerns with suffering; Hopeful;Peaceful   Intervention Active listening; Other (Comment)  (PT said he didn't feel up to a visit and wasn't in a right state of mind. I told him to reach out anytime and that we would continue to check in on him. Alphonso Frederick)   Outcome Refused/Declined  (Declined visit at this time. Just wasn't feeling well.)   Plan and Referrals   Plan/Referrals Continue to visit, (comment); Continue Support (comment)

## 2023-11-29 NOTE — PROGRESS NOTES
V2.0    [facility] Progress Note      Name:  Deanne Burciaga /Age/Sex: 1926  (80 y.o. male)   MRN & CSN:  [id number] & [id number] Encounter Date/Time: 2023 7:31 AM EST   [facility] Marvin Del Valle MD     Attending:[facility] Day: 7    Assessment and Recommendations   Deanne Burciaga is a 80 y.o. male with pmh of CAD,PAF not on AC,AVS, bilateral carotid artery stenosis, history of pacemaker, CKD stage III, chronic back pain, restless leg syndrome, and insomnia  who presents with Intertrochanteric fracture of right femur, closed, initial encounter ([address])      Plan:   Acute comminuted, displaced and angulated right proximal femur intertrochanteric fracture s/p closed reduction and intramedullary nail    Presented with history of fall, x-ray right hip showed fracture  -- Ortho on board, patient is s/p fixation POD 5  -- Pain remains uncontrolled, will adjust regimen  -- PT/OT evaluated and recommended SNF    Fall  CT head, CT C-spine-no acute process. -- PT/OT evaluation completed, recs appreciated.      Left upper extremity swelling rule out DVT - ruled out  Noticed left forearm swelling which is firm with some complaint of pain  -- Vas dup is neg for a DVT     Acute on Chronic anemia suspected postop  Baseline 11-12  -- 8.7 this AM, thought to be op related, will  continue to monitor CBC  -- If <7 at any point, plan to transfuse      Coronary artery disease  On Aspirin, metoprolol succinate, ezetimibe, allergic to statins,   -- Continue home regimen  -- Follows with cards     Paroxysmal atrial fibrillation  -- Not on anticoagulation     Aortic valve stenosis  -- As per cardiology recommendation, given advanced age, conservative management     CKD stage III- stable  Follows with Dr. Deangelo Herrera  -- Baseline Cr is 2.1-2.4, currently 2.3  -- Continue to monitor , renally dose meds and avoid nephrotoxins     Chronic back pain  Electric scooter, Patient on 11/23/2023 4:08 pm COMPARISON: None. HISTORY: ORDERING SYSTEM PROVIDED HISTORY: right leg pain TECHNOLOGIST PROVIDED HISTORY: Reason for exam:->right leg pain Reason for Exam: right leg pain after fall Additional signs and symptoms: Pt to the ED via EMS after falling out of his electric scooter and landing on his right hip. Per EMS, there is obvious rotation. Pt has a 3 inch lac to the right shin. Pt was given 100mcg of fentanyl en route. FINDINGS: Right hip: Three views provided. Global decreased bone mineral density. Multilevel lumbar degenerative changes with prior posterior fusion. Normal bilateral sacroiliac and hip alignment. Acute comminuted displaced and angulated right proximal femur intratrochanteric fracture. Prostate bed brachytherapy seeds. Right femur: Two views provided. Peripheral vascular arterial calcifications. Decreased bone mineral density. Comminuted displaced and angulated proximal femur intratrochanteric fracture. The femoral diaphysis is intact. The visualized portion of the distal metaphysis is intact. Right tibia/fibula: Two views provided. Peripheral vascular arterial calcifications. Decreased bone mineral density. The tibia and fibula demonstrate no acute fracture. 1. Acute comminuted, displaced, and angulated right proximal femur intratrochanteric fracture. Normal right hip alignment. 2. The right femoral diaphysis demonstrates no acute fracture. 3. The right lower leg demonstrates no acute fracture. XR TIBIA FIBULA RIGHT (2 VIEWS)    Result Date: 11/23/2023  EXAMINATION: 2 XRAY VIEWS OF THE RIGHT FEMUR; 2 XRAY VIEWS OF THE RIGHT TIBIA AND FIBULA; ONE XRAY VIEW OF THE PELVIS AND TWO XRAY VIEWS RIGHT HIP 11/23/2023 4:08 pm COMPARISON: None.  HISTORY: ORDERING SYSTEM PROVIDED HISTORY: right leg pain TECHNOLOGIST PROVIDED HISTORY: Reason for exam:->right leg pain Reason for Exam: right leg pain after fall Additional signs and symptoms: Pt to the ED via EMS after falling

## 2023-11-30 LAB
HCT VFR BLD CALC: 25.4 % (ref 42–52)
HEMOGLOBIN: 7.7 GM/DL (ref 13.5–18)
MCH RBC QN AUTO: 28.8 PG (ref 27–31)
MCHC RBC AUTO-ENTMCNC: 30.3 % (ref 32–36)
MCV RBC AUTO: 95.1 FL (ref 78–100)
PDW BLD-RTO: 15.9 % (ref 11.7–14.9)
PLATELET # BLD: 222 K/CU MM (ref 140–440)
PMV BLD AUTO: 9.8 FL (ref 7.5–11.1)
RBC # BLD: 2.67 M/CU MM (ref 4.6–6.2)
WBC # BLD: 7.4 K/CU MM (ref 4–10.5)

## 2023-11-30 PROCEDURE — 1200000000 HC SEMI PRIVATE

## 2023-11-30 PROCEDURE — 6370000000 HC RX 637 (ALT 250 FOR IP): Performed by: STUDENT IN AN ORGANIZED HEALTH CARE EDUCATION/TRAINING PROGRAM

## 2023-11-30 PROCEDURE — 6370000000 HC RX 637 (ALT 250 FOR IP): Performed by: ORTHOPAEDIC SURGERY

## 2023-11-30 PROCEDURE — 85027 COMPLETE CBC AUTOMATED: CPT

## 2023-11-30 PROCEDURE — 2580000003 HC RX 258: Performed by: ORTHOPAEDIC SURGERY

## 2023-11-30 PROCEDURE — 94761 N-INVAS EAR/PLS OXIMETRY MLT: CPT

## 2023-11-30 PROCEDURE — 94150 VITAL CAPACITY TEST: CPT

## 2023-11-30 PROCEDURE — 6360000002 HC RX W HCPCS: Performed by: STUDENT IN AN ORGANIZED HEALTH CARE EDUCATION/TRAINING PROGRAM

## 2023-11-30 PROCEDURE — 6360000002 HC RX W HCPCS: Performed by: ORTHOPAEDIC SURGERY

## 2023-11-30 PROCEDURE — 99211 OFF/OP EST MAY X REQ PHY/QHP: CPT

## 2023-11-30 PROCEDURE — 36415 COLL VENOUS BLD VENIPUNCTURE: CPT

## 2023-11-30 PROCEDURE — 97530 THERAPEUTIC ACTIVITIES: CPT

## 2023-11-30 RX ORDER — OXYCODONE HYDROCHLORIDE AND ACETAMINOPHEN 5; 325 MG/1; MG/1
1 TABLET ORAL EVERY 6 HOURS PRN
Status: DISCONTINUED | OUTPATIENT
Start: 2023-11-30 | End: 2023-12-01 | Stop reason: HOSPADM

## 2023-11-30 RX ADMIN — METOPROLOL SUCCINATE 12.5 MG: 25 TABLET, EXTENDED RELEASE ORAL at 09:04

## 2023-11-30 RX ADMIN — OXYCODONE AND ACETAMINOPHEN 1 TABLET: 5; 325 TABLET ORAL at 21:38

## 2023-11-30 RX ADMIN — SODIUM CHLORIDE, PRESERVATIVE FREE 10 ML: 5 INJECTION INTRAVENOUS at 21:39

## 2023-11-30 RX ADMIN — OXYCODONE AND ACETAMINOPHEN 1 TABLET: 5; 325 TABLET ORAL at 09:05

## 2023-11-30 RX ADMIN — FUROSEMIDE 40 MG: 40 TABLET ORAL at 09:04

## 2023-11-30 RX ADMIN — SODIUM CHLORIDE, PRESERVATIVE FREE 10 ML: 5 INJECTION INTRAVENOUS at 09:04

## 2023-11-30 RX ADMIN — SODIUM CHLORIDE, PRESERVATIVE FREE 10 ML: 5 INJECTION INTRAVENOUS at 21:40

## 2023-11-30 RX ADMIN — SODIUM CHLORIDE, PRESERVATIVE FREE 10 ML: 5 INJECTION INTRAVENOUS at 12:47

## 2023-11-30 RX ADMIN — ASPIRIN 81 MG: 81 TABLET, CHEWABLE ORAL at 09:04

## 2023-11-30 RX ADMIN — ROPINIROLE HYDROCHLORIDE 0.75 MG: 0.25 TABLET, FILM COATED ORAL at 21:37

## 2023-11-30 RX ADMIN — HYDROMORPHONE HYDROCHLORIDE 0.5 MG: 1 INJECTION, SOLUTION INTRAMUSCULAR; INTRAVENOUS; SUBCUTANEOUS at 14:36

## 2023-11-30 RX ADMIN — ENOXAPARIN SODIUM 30 MG: 100 INJECTION SUBCUTANEOUS at 09:03

## 2023-11-30 RX ADMIN — OXYCODONE AND ACETAMINOPHEN 1 TABLET: 5; 325 TABLET ORAL at 02:46

## 2023-11-30 RX ADMIN — CYCLOBENZAPRINE 10 MG: 10 TABLET, FILM COATED ORAL at 21:38

## 2023-11-30 RX ADMIN — HYDROMORPHONE HYDROCHLORIDE 0.5 MG: 1 INJECTION, SOLUTION INTRAMUSCULAR; INTRAVENOUS; SUBCUTANEOUS at 04:35

## 2023-11-30 ASSESSMENT — PAIN DESCRIPTION - FREQUENCY: FREQUENCY: INTERMITTENT

## 2023-11-30 ASSESSMENT — PAIN DESCRIPTION - DESCRIPTORS
DESCRIPTORS: ACHING;PRESSURE;SHOOTING
DESCRIPTORS: ACHING;SORE
DESCRIPTORS: SHARP;THROBBING
DESCRIPTORS: CRAMPING;THROBBING

## 2023-11-30 ASSESSMENT — PAIN DESCRIPTION - ORIENTATION
ORIENTATION: LOWER;MID
ORIENTATION: RIGHT
ORIENTATION: RIGHT
ORIENTATION: LEFT

## 2023-11-30 ASSESSMENT — PAIN DESCRIPTION - LOCATION
LOCATION: BACK
LOCATION: HIP;HEAD
LOCATION: HIP
LOCATION: HIP

## 2023-11-30 ASSESSMENT — PAIN - FUNCTIONAL ASSESSMENT
PAIN_FUNCTIONAL_ASSESSMENT: PREVENTS OR INTERFERES WITH MANY ACTIVE NOT PASSIVE ACTIVITIES
PAIN_FUNCTIONAL_ASSESSMENT: PREVENTS OR INTERFERES SOME ACTIVE ACTIVITIES AND ADLS
PAIN_FUNCTIONAL_ASSESSMENT: PREVENTS OR INTERFERES SOME ACTIVE ACTIVITIES AND ADLS

## 2023-11-30 ASSESSMENT — PAIN SCALES - GENERAL
PAINLEVEL_OUTOF10: 10
PAINLEVEL_OUTOF10: 10
PAINLEVEL_OUTOF10: 0
PAINLEVEL_OUTOF10: 0
PAINLEVEL_OUTOF10: 10
PAINLEVEL_OUTOF10: 9
PAINLEVEL_OUTOF10: 1

## 2023-11-30 ASSESSMENT — PAIN DESCRIPTION - ONSET: ONSET: ON-GOING

## 2023-11-30 ASSESSMENT — PAIN DESCRIPTION - PAIN TYPE: TYPE: SURGICAL PAIN

## 2023-11-30 NOTE — PROGRESS NOTES
Comprehensive Nutrition Assessment    Type and Reason for Visit:  Initial (length of stay)    Nutrition Recommendations/Plan:   Continue regular diet   Will offer oral nutrition supplement  Assist with meal setup as needed  Will continue to follow up during stay      Malnutrition Assessment:  Malnutrition Status:  No malnutrition (11/30/23 1221)    Context:  Acute Illness       Nutrition Assessment:    Admit with hx fall with fracture now s/p hip surgery. Able to tolerate regular diet, with recent meal intake %. Eating lunch on visit but some difficulty with meat today. Will follow at low nutrition risk at this time with reasonable intake and no hx of weight loss. Does have increased needs for healing, patient agreeable to try oral nutrition supplement. Nutrition Related Findings:    up in bed eating lunch, able to feed self, some difficulty cutting chicken but did not want assist,  hx CAD, CKD Wound Type: Surgical Incision       Current Nutrition Intake & Therapies:    Average Meal Intake: %  Average Supplements Intake: None Ordered  ADULT DIET; Regular    Anthropometric Measures:  Height: 175.3 cm (5' 9\")  Ideal Body Weight (IBW): 160 lbs (73 kg)    Admission Body Weight: 85.6 kg (188 lb 11.4 oz)  Current Body Weight: 85.3 kg (188 lb), 117.5 % IBW. Weight Source: Stated  Current BMI (kg/m2): 27.8  Usual Body Weight: 85 kg (187 lb 6.3 oz) (hx April 2023)  % Weight Change (Calculated): 0.3  Weight Adjustment For: No Adjustment                 BMI Categories: Overweight (BMI 25.0-29. 9)    Estimated Daily Nutrient Needs:  Energy Requirements Based On: Formula  Weight Used for Energy Requirements: Current  Energy (kcal/day): 1900 (mifflin st jeor)  Weight Used for Protein Requirements: Ideal  Protein (g/day):  (1.2-1.4 g/kg)  Method Used for Fluid Requirements: 1 ml/kcal  Fluid (ml/day): 1900    Nutrition Diagnosis:   Increased nutrient needs related to acute injury/trauma, increase demand for

## 2023-11-30 NOTE — CONSULTS
[facility] Continence Nurse  Consult Note       Tennille Cage  AGE: 80 y.o. GENDER: male  : 1926  TODAY'S DATE:  2023    Subjective:     Reason for  Evaluation and Assessment: wound care evalFranchesca Cage is a 80 y.o. male referred by:   [x] Physician  [] Nursing  [] Other:     Wound Identification:  Wound Type: pressure, skin tear, and surgical   Contributing Factors: chronic pressure, decreased mobility, and s/p rt hip fracture. PAST MEDICAL HISTORY        Diagnosis Date    Arthritis     CAD (coronary artery disease)     H/O Doppler carotid ultrasound 2020    Mod -Severe disease Right ICA, Mild disease Left ICA. H/O echocardiogram 2020    EF 55-60%, MOD AS, Mild: PHTN, MR, TR & AR. Aortic root 3.8 cm. Hx of Doppler echocardiogram 2022    EF 50-55% Mod LV hypertrophy. Mildly dialted LA. Heavily calcified aortic valve iwht mod to severe AS. Mitral annular calcification. Mild TR and MR. Mod AR. Dilated aortic root. Hyperlipidemia     Hypertension     Spinal stenosis        PAST SURGICAL HISTORY    Past Surgical History:   Procedure Laterality Date    BACK SURGERY      CAROTID ENDARTERECTOMY      CATARACT REMOVAL      CHOLECYSTECTOMY      CORONARY ANGIOPLASTY WITH STENT PLACEMENT      HIP SURGERY Right 2023    HIP IM NAIL NAJU INSERTION performed by Joaquin Ríos DO at [address] Left 2022    dual chamber generator change. [employer] mindy XT DR SILVIA Loza implanted by Dr. Way Commander.     PACEMAKER PLACEMENT         FAMILY HISTORY    Family History   Problem Relation Age of Onset    Cancer Sister     Diabetes Brother        SOCIAL HISTORY    Social History     Tobacco Use    Smoking status: Former     Packs/day: 1     Types: Cigarettes     Quit date: 1983     Years since quittin.9    Smokeless tobacco: Never   Vaping Use    Vaping Use: Never used   Substance Use Topics    Alcohol use: Never     Comment: Caffeine: Description Serous 11/30/23 0845   Odor None 11/30/23 0845   Meron-wound Assessment Intact; Ecchymosis 11/30/23 0845   Margins Defined edges 11/30/23 0845   Wound Thickness Description not for Pressure Injury Partial thickness 11/30/23 0845   Number of days: 3       Wound 11/30/23 Buttocks cluster (Active)   Wound Image   11/30/23 0845   Wound Etiology Deep tissue/Injury 11/30/23 0845   Dressing Status New dressing applied 11/30/23 0845   Wound Cleansed Cleansed with saline 11/30/23 0845   Dressing/Treatment Silicone border 99/70/07 0845   Wound Length (cm) 2.5 cm 11/30/23 0845   Wound Width (cm) 8 cm 11/30/23 0845   Wound Depth (cm) 0.1 cm 11/30/23 0845   Wound Surface Area (cm^2) 20 cm^2 11/30/23 0845   Wound Volume (cm^3) 2 cm^3 11/30/23 0845   Distance Tunneling (cm) 0 cm 11/30/23 0845   Tunneling Position ___ O'Clock 0 11/30/23 0845   Undermining Starts ___ O'Clock 0 11/30/23 0845   Undermining Ends___ O'Clock 0 11/30/23 0845   Undermining Maxium Distance (cm) 0 11/30/23 0845   Wound Assessment Purple/maroon 11/30/23 0845   Drainage Amount None (dry) 11/30/23 0845   Odor None 11/30/23 0845   Meron-wound Assessment Fragile 11/30/23 0845   Margins Attached edges 11/30/23 0845   Number of days: 0       Response to treatment:  With complaints of pain. Pain Assessment:   Severity: moderate  Quality of pain: sore  Wound Pain Timing/Severity: when turning   Premedicated: no    Plan:     Plan of Care: Wound 11/27/23 Radial Left-Dressing/Treatment: Collagen, Silicone border  Wound 11/30/23 Buttocks cluster-Dressing/Treatment: Silicone border  Wound 11/24/23 Pretibial Right-Dressing/Treatment: Xeroform, ABD, Roll gauze    Patient in bed agreeable to wound care eval. Pt has a rt leg wound with sutures/open cleansed with NS measured and pictured. Applied xeroform abd kerlix ace wrap. Rt hip with staples intact/approximated  with some drainage. Lt arm with a skin tear cleansed with NS measured and pictured.  Applied

## 2023-11-30 NOTE — PROGRESS NOTES
Physical Therapy Treatment Note  Name: Mundo Milner MRN: [id number] :   1926   Date:  2023   Admission Date: 2023 Room:  [address] [place] [address]   Restrictions/Precautions:  WBAT R LE, general, fall risk   Communication with other providers:  Pt okay to see for therapy per RN   Subjective:  Patient states:  Agreeable to session and pt remains in significant pain. Pain:   Location, Type, Intensity (0/10 to 10/10):  10/10 pn   Objective:    Observation:  Pt supine in bed upon PTA arrival.   Objective Measures:  Tele, stable. Treatment, including education/measures:    Therapeutic Activity Training:   Therapeutic activity training was instructed today. Cues were given for safety, sequence, UE/LE placement, awareness, and balance. Activities performed today included bed mobility training, sup-sit, sit-stand, SPT. Mobility:  Sup > sit: Mod A at trunk and hips to scoot to EOB with pt verbalizing significant pain. Scooting: Mod A   STS: First attempt to stand pt only able to partially clear buttocks with Max A and partially raised bed. Pt performed full stand from EOB Max A with raised bed. SPT: Mod A a gait belt, Min A at RW to advance. Pt demos shaking and mild LE buckling but able to safety pivot to chair with Mod A to lower safely. Pt requires increased time for all transitions. Safety:   Pt returned safely to recliner with chair alarm activated, call light in reach, all needs met. Assessment / Impression:    Pt performed SPT with decreased assist this date but remains heavy assist for all STSs. Patient's tolerance of treatment:  Good   Adverse Reaction: Pain  Significant change in status and impact:  none  Barriers to improvement:  pain. Plan for Next Session:    Plan to continue OOB activities.    Time in:  1404  Time out:  1431  Timed treatment minutes:  27  Total treatment time:  27    Previously filed items:  Social/Functional History  Lives With: Spouse  Type of Home: Condo  Home Layout: One level  Home Access: Level entry  Bathroom Shower/Tub: Walk-in shower  Bathroom Toilet: Handicap height  Bathroom Equipment: Grab bars in shower, Shower chair  Home Equipment: Merwhayder Iniguez, 4 wheeled  ADL Assistance: Independent  Ambulation Assistance: Independent (mod I with 4ww)  Transfer Assistance: Independent  Active : Yes     Long Term Goals  Time Frame for Long Term Goals :  In one week:  Long Term Goal 1: Pt will complete all bed mobility with minAx2  Long Term Goal 2: Pt will complete sit <> stand transfers with minAx1  Long Term Goal 3: Pt will complete bed <> chair transfers with minAx1  Long Term Goal 4: Pt will ambulate 10 feet with modAx2 with LRAD  Long Term Goal 5: Pt will independently complete 3 sets of 10 reps of BLE AROM exercises       Electronically signed by:    Ron Webb, RAMONA  11/30/2023, 3:37 PM

## 2023-11-30 NOTE — PROGRESS NOTES
V2.0    [facility] Progress Note      Name:  Judith Escamilla /Age/Sex: 1926  (80 y.o. male)   MRN & CSN:  [id number] & [id number] Encounter Date/Time: 2023 7:31 AM EST   Location:  [facility]3/1113-A PCP: Faheem Soto MD     Attending:[facility] CHILDREN Day: 8    Assessment and Recommendations   Judith Escamilla is a 80 y.o. male with pmh of CAD,PAF not on AC,AVS, bilateral carotid artery stenosis, history of pacemaker, CKD stage III, chronic back pain, restless leg syndrome, and insomnia  who presents with Intertrochanteric fracture of right femur, closed, initial encounter ([address])      Plan:   Acute comminuted, displaced and angulated right proximal femur intertrochanteric fracture s/p closed reduction and intramedullary nail    Presented with history of fall, x-ray right hip showed fracture  -- Ortho on board, patient is s/p fixation POD 6  -- Pain improved this AM, continue to manage pain  -- PT/OT evaluated and recommended SNF  --Wound care consulted, appreciate input    Fall  CT head, CT C-spine-no acute process. -- PT/OT evaluation completed, recs appreciated.      Left upper extremity swelling rule out DVT - ruled out  Noticed left forearm swelling which is firm with some complaint of pain  -- Vas dup is neg for a DVT     Acute on Chronic anemia suspected postop  Baseline 11-12  -- 7.7 this AM, will continue to monitor CBC  -- If <7 at any point, plan to transfuse      Coronary artery disease  On Aspirin, metoprolol succinate, ezetimibe, allergic to statins,   -- Continue home regimen  -- Follows with cards     Paroxysmal atrial fibrillation  -- Not on anticoagulation     Aortic valve stenosis  -- As per cardiology recommendation, given advanced age, conservative management     CKD stage III- stable  Follows with Dr. Kermit Ruiz  -- Baseline Cr is 2.1-2.4, was stable at  2.3  -- Continue to monitor , renally dose meds and avoid nephrotoxins     Chronic back pain  Electric PROVIDED HISTORY: Reason for exam:->right leg pain Reason for Exam: right leg pain after fall Additional signs and symptoms: Pt to the ED via EMS after falling out of his electric scooter and landing on his right hip. Per EMS, there is obvious rotation. Pt has a 3 inch lac to the right shin. Pt was given 100mcg of fentanyl en route. FINDINGS: Right hip: Three views provided. Global decreased bone mineral density. Multilevel lumbar degenerative changes with prior posterior fusion. Normal bilateral sacroiliac and hip alignment. Acute comminuted displaced and angulated right proximal femur intratrochanteric fracture. Prostate bed brachytherapy seeds. Right femur: Two views provided. Peripheral vascular arterial calcifications. Decreased bone mineral density. Comminuted displaced and angulated proximal femur intratrochanteric fracture. The femoral diaphysis is intact. The visualized portion of the distal metaphysis is intact. Right tibia/fibula: Two views provided. Peripheral vascular arterial calcifications. Decreased bone mineral density. The tibia and fibula demonstrate no acute fracture. 1. Acute comminuted, displaced, and angulated right proximal femur intratrochanteric fracture. Normal right hip alignment. 2. The right femoral diaphysis demonstrates no acute fracture. 3. The right lower leg demonstrates no acute fracture. XR TIBIA FIBULA RIGHT (2 VIEWS)    Result Date: 11/23/2023  EXAMINATION: 2 XRAY VIEWS OF THE RIGHT FEMUR; 2 XRAY VIEWS OF THE RIGHT TIBIA AND FIBULA; ONE XRAY VIEW OF THE PELVIS AND TWO XRAY VIEWS RIGHT HIP 11/23/2023 4:08 pm COMPARISON: None. HISTORY: ORDERING SYSTEM PROVIDED HISTORY: right leg pain TECHNOLOGIST PROVIDED HISTORY: Reason for exam:->right leg pain Reason for Exam: right leg pain after fall Additional signs and symptoms: Pt to the ED via EMS after falling out of his electric scooter and landing on his right hip. Per EMS, there is obvious rotation.  Pt has a 3

## 2023-12-01 VITALS
SYSTOLIC BLOOD PRESSURE: 127 MMHG | OXYGEN SATURATION: 96 % | HEART RATE: 72 BPM | WEIGHT: 188 LBS | RESPIRATION RATE: 20 BRPM | DIASTOLIC BLOOD PRESSURE: 78 MMHG | TEMPERATURE: 97.8 F | HEIGHT: 69 IN | BODY MASS INDEX: 27.85 KG/M2

## 2023-12-01 LAB
ANION GAP SERPL CALCULATED.3IONS-SCNC: 14 MMOL/L (ref 4–16)
BUN SERPL-MCNC: 56 MG/DL (ref 6–23)
CALCIUM SERPL-MCNC: 8.4 MG/DL (ref 8.3–10.6)
CHLORIDE BLD-SCNC: 99 MMOL/L (ref 99–110)
CO2: 24 MMOL/L (ref 21–32)
CREAT SERPL-MCNC: 2.2 MG/DL (ref 0.9–1.3)
GFR SERPL CREATININE-BSD FRML MDRD: 27 ML/MIN/1.73M2
GLUCOSE SERPL-MCNC: 127 MG/DL (ref 70–99)
HCT VFR BLD CALC: 28.2 % (ref 42–52)
HEMOGLOBIN: 8.5 GM/DL (ref 13.5–18)
MCH RBC QN AUTO: 28.5 PG (ref 27–31)
MCHC RBC AUTO-ENTMCNC: 30.1 % (ref 32–36)
MCV RBC AUTO: 94.6 FL (ref 78–100)
PDW BLD-RTO: 16 % (ref 11.7–14.9)
PLATELET # BLD: 279 K/CU MM (ref 140–440)
PMV BLD AUTO: 9.6 FL (ref 7.5–11.1)
POTASSIUM SERPL-SCNC: 4 MMOL/L (ref 3.5–5.1)
RBC # BLD: 2.98 M/CU MM (ref 4.6–6.2)
SARS-COV-2 RDRP RESP QL NAA+PROBE: NOT DETECTED
SODIUM BLD-SCNC: 137 MMOL/L (ref 135–145)
SOURCE: NORMAL
WBC # BLD: 10.4 K/CU MM (ref 4–10.5)

## 2023-12-01 PROCEDURE — 2580000003 HC RX 258: Performed by: ORTHOPAEDIC SURGERY

## 2023-12-01 PROCEDURE — 6360000002 HC RX W HCPCS: Performed by: STUDENT IN AN ORGANIZED HEALTH CARE EDUCATION/TRAINING PROGRAM

## 2023-12-01 PROCEDURE — 87635 SARS-COV-2 COVID-19 AMP PRB: CPT

## 2023-12-01 PROCEDURE — 6370000000 HC RX 637 (ALT 250 FOR IP): Performed by: ORTHOPAEDIC SURGERY

## 2023-12-01 PROCEDURE — 97530 THERAPEUTIC ACTIVITIES: CPT

## 2023-12-01 PROCEDURE — 94761 N-INVAS EAR/PLS OXIMETRY MLT: CPT

## 2023-12-01 PROCEDURE — 85027 COMPLETE CBC AUTOMATED: CPT

## 2023-12-01 PROCEDURE — 6360000002 HC RX W HCPCS: Performed by: ORTHOPAEDIC SURGERY

## 2023-12-01 PROCEDURE — 36415 COLL VENOUS BLD VENIPUNCTURE: CPT

## 2023-12-01 PROCEDURE — 6370000000 HC RX 637 (ALT 250 FOR IP): Performed by: STUDENT IN AN ORGANIZED HEALTH CARE EDUCATION/TRAINING PROGRAM

## 2023-12-01 PROCEDURE — 80048 BASIC METABOLIC PNL TOTAL CA: CPT

## 2023-12-01 PROCEDURE — 97535 SELF CARE MNGMENT TRAINING: CPT

## 2023-12-01 RX ADMIN — HYDROMORPHONE HYDROCHLORIDE 0.5 MG: 1 INJECTION, SOLUTION INTRAMUSCULAR; INTRAVENOUS; SUBCUTANEOUS at 09:04

## 2023-12-01 RX ADMIN — ENOXAPARIN SODIUM 30 MG: 100 INJECTION SUBCUTANEOUS at 11:07

## 2023-12-01 RX ADMIN — OXYCODONE AND ACETAMINOPHEN 1 TABLET: 5; 325 TABLET ORAL at 05:37

## 2023-12-01 RX ADMIN — SODIUM CHLORIDE, PRESERVATIVE FREE 10 ML: 5 INJECTION INTRAVENOUS at 11:08

## 2023-12-01 RX ADMIN — HYDROMORPHONE HYDROCHLORIDE 0.5 MG: 1 INJECTION, SOLUTION INTRAMUSCULAR; INTRAVENOUS; SUBCUTANEOUS at 17:19

## 2023-12-01 RX ADMIN — ASPIRIN 81 MG: 81 TABLET, CHEWABLE ORAL at 11:07

## 2023-12-01 RX ADMIN — HYDROMORPHONE HYDROCHLORIDE 0.5 MG: 1 INJECTION, SOLUTION INTRAMUSCULAR; INTRAVENOUS; SUBCUTANEOUS at 00:00

## 2023-12-01 RX ADMIN — SODIUM CHLORIDE, PRESERVATIVE FREE 10 ML: 5 INJECTION INTRAVENOUS at 11:15

## 2023-12-01 RX ADMIN — FUROSEMIDE 40 MG: 40 TABLET ORAL at 11:07

## 2023-12-01 ASSESSMENT — PAIN - FUNCTIONAL ASSESSMENT
PAIN_FUNCTIONAL_ASSESSMENT: PREVENTS OR INTERFERES SOME ACTIVE ACTIVITIES AND ADLS
PAIN_FUNCTIONAL_ASSESSMENT: PREVENTS OR INTERFERES SOME ACTIVE ACTIVITIES AND ADLS

## 2023-12-01 ASSESSMENT — PAIN DESCRIPTION - LOCATION
LOCATION: HIP;BACK
LOCATION: BACK;HIP
LOCATION: HIP
LOCATION: HIP;BACK

## 2023-12-01 ASSESSMENT — PAIN SCALES - GENERAL
PAINLEVEL_OUTOF10: 9
PAINLEVEL_OUTOF10: 3
PAINLEVEL_OUTOF10: 10
PAINLEVEL_OUTOF10: 10
PAINLEVEL_OUTOF10: 8
PAINLEVEL_OUTOF10: 0
PAINLEVEL_OUTOF10: 3
PAINLEVEL_OUTOF10: 9
PAINLEVEL_OUTOF10: 0

## 2023-12-01 ASSESSMENT — PAIN SCALES - WONG BAKER
WONGBAKER_NUMERICALRESPONSE: 8
WONGBAKER_NUMERICALRESPONSE: 0

## 2023-12-01 ASSESSMENT — PAIN DESCRIPTION - DESCRIPTORS
DESCRIPTORS: SHARP;ACHING
DESCRIPTORS: ACHING;THROBBING
DESCRIPTORS: SHARP
DESCRIPTORS: SHARP;ACHING

## 2023-12-01 ASSESSMENT — PAIN DESCRIPTION - ORIENTATION
ORIENTATION: RIGHT;MID
ORIENTATION: LEFT
ORIENTATION: RIGHT
ORIENTATION: RIGHT

## 2023-12-01 NOTE — DISCHARGE INSTR - COC
Continuity of Care Form    Patient Name: Chelsea Barillas   :  1926  MRN:  [id number]    Admit date:  2023  Discharge date:  2023    Code Status Order: Full Code   Advance Directives:   [address] Documentation       Date/Time Healthcare Directive Type of Healthcare Directive Copy in [address] Agent's Name Healthcare Agent's Phone Number    [phone] Yes, patient has an advance directive for healthcare treatment Living will;Durable power of  for health care No, copy requested from family -- -- --            Admitting Physician:  Stuart Butler MD  PCP: Raymond Ortiz MD    Discharging Nurse: Anil Whipple Unit/Room#: 4067/4295-Z  Discharging Unit Phone Number: [phone]    Emergency Contact:   Extended Emergency Contact Information  Primary Emergency Contact: [address] Phone: [phone]  Mobile Phone: [phone]  Relation: Child  Secondary Emergency Contact: [address] [place] [address] Phone: [phone]  Relation: Spouse    Past Surgical History:  Past Surgical History:   Procedure Laterality Date    BACK SURGERY      CAROTID ENDARTERECTOMY      CATARACT REMOVAL      CHOLECYSTECTOMY      CORONARY ANGIOPLASTY WITH STENT PLACEMENT      HIP SURGERY Right 2023    HIP IM NAIL ANJU INSERTION performed by Tash Prado DO at [address] Left 2022    dual chamber generator change. [employer]onic mindy XT DR SILVIA Loza implanted by Dr. Kym Weeks.     PACEMAKER PLACEMENT         Immunization History:   Immunization History   Administered Date(s) Administered    COVID-19, PFIZER Bivalent, DO NOT Dilute, (age 12y+), IM, 30 mcg/0.3 mL 2022    COVID-19, PFIZER GRAY top, DO NOT Dilute, (age 15 y+), IM, 30 mcg/0.3 mL 2022    COVID-19, PFIZER PURPLE top, DILUTE for use, (age 15 y+), 30mcg/0.3mL 2021, 2021, 2021    COVID-19, PFIZER, ( formula), (age 12y+),

## 2023-12-01 NOTE — CARE COORDINATION
**Upon discharge patient will be going to   [facility]    * Call facility to advise patient is ready to be discharged. *Please notify family.     * Please fax  H/P,Scripts, AVS and  Completed EARNESTINE to [phone]  *Please call report to [phone]    **Needs Completed EARNESTINE

## 2023-12-01 NOTE — PROGRESS NOTES
Physical Therapy Treatment Note  Name: Freedom Gonzalez MRN: [id number] :   1926   Date:  2023   Admission Date: 2023 Room:  [address] [place] [address]   Restrictions/Precautions:  WBAT R LE, general, fall risk  Communication with other providers:  Pt okay to see for therapy per RN, CoTx with Carlos Enrique Sharp for pt safety and tolerance. Subjective:  Patient states:  Agreeable to session   Pain:   Location, Type, Intensity (0/10 to 10/10):  10.5/10 per pt, RN administered IV dilaudid   Objective:    Observation:  Tele, stable   Objective Measures:  Pt supine in bed upon therapy arrival.   Treatment, including education/measures:    Therapeutic Activity Training:   Therapeutic activity training was instructed today. Cues were given for safety, sequence, UE/LE placement, awareness, and balance. Activities performed today included bed mobility training, sup-sit, sit-stand, SPT. Mobility:  Sup > sit: Mod A at trunk and hips to transition to EOB. Scooting: Mod A to scoot. STS: Mod A x 2 to stand with max cues for R knee ext. Pt is unable to fully extend R LE despite cues for the same. SPT: Mod A x 2 to pivot to chair dt heavy B knee buckling this session. Pt requires Mod A at walker to advance. Pt requires Mod A for controlled sit. Education:  PTA provided cues for knee ext at R LE with 3 sec hold x 10 reps. PTA instructed to perform throughout the day. Safety:   Pt returned safely to recliner with chair alarm activated, call light in reach, all needs met. Assessment / Impression:    Mobility remains limited dt high pain levels this session. Patient's tolerance of treatment:  Fair   Adverse Reaction: Pain  Significant change in status and impact:  none  Barriers to improvement:  Pain   Plan for Next Session:    Plan to continue OOB activities and trial ambulation when able, possibly with aris plus.    Time in:  857  Time out:  920  Timed treatment minutes:  23  Total treatment time:

## 2023-12-01 NOTE — CARE COORDINATION
Transport scheduled for 5pm per [facility] request. Nurse and [facility] advised on transport time.

## 2023-12-01 NOTE — PROGRESS NOTES
Report called to Abran Hopkins at 16:25. Called daughter Brittany Chan with no answer and unable to leave voicemail.  Spoke to daughter Sean Kay who said she will relay the message to Brittany Chan and his wife Shanika Linares that he is transporting to Abran Hopkins at [facility].

## 2023-12-01 NOTE — PROGRESS NOTES
Occupational Therapy    Occupational Therapy Treatment Note    Name: Kiana Montague MRN: [id number] :   1926   Date:  2023   Admission Date: 2023 Room:  [address] [place] [address]-     Primary Problem: Intertrochanteric fracture of right femur    Restrictions/Precautions: General Precautions, Fall Risk, Weight Bearing; Lower Extremity Weight Bearing Restrictions, Right Lower Extremity Weight Bearing: Weight Bearing As Tolerated    Communication with other providers: RN approved session co tx with PTA for mobility    Subjective:  Patient states:  Pt agreeable to session. Pain:   Location, Type, Intensity (0/10 to 10/10):  10.5/10 in right hip RN aware. Objective:    Observation: Pt supine in bed upon arrival    Objective Measures:  Pt alert and oriented. Treatment, including education:  Therapeutic Activity Training:   Therapeutic activity training was instructed today. Cues were given for safety, sequence, UE/LE placement, awareness, and balance. Activities performed today included bed mobility training, sup-sit, sit-stand, SPT. Pt supine in bed upon arrival and agreeable to session. Pt completed sup to sit with head of bed elevated and use of bed rails with MOD A and completed scooting to edge of bed with MOD A. Pt completed sit to stand from edge of bed MOD A x2 with two trials. Pt completed side stepping edge of bed with MOD to MAX A x2 with R knee buckle. Pt completed pivot to chair with MOD A x2. Pt seated in chair with MOD A x1. Self Care Training:   Cues were given for safety, sequence, UE/LE placement, visual cues, and balance. Activities performed today included LB dressing tasks    Pt seated edge of bed and completed pericare with DEP A. Pt doffed and donned undergarment with MAX A. Pt seated in chair and required set up and MIN A for meal prep with noted tremor in R hand. Pt seated with all needs met and call light in reach. Chair alarm on.      Assessment / Impression:
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
## 2023-12-06 ENCOUNTER — HOSPITAL ENCOUNTER (OUTPATIENT)
Age: 88
Setting detail: SPECIMEN
Discharge: HOME | End: 2023-12-06
Payer: MEDICARE

## 2023-12-06 LAB
ALBUMIN SERPL-MCNC: 3 GM/DL (ref 3.4–5)
ALP BLD-CCNC: 90 IU/L (ref 40–128)
ALT SERPL-CCNC: 14 U/L (ref 10–40)
ANION GAP SERPL CALCULATED.3IONS-SCNC: 14 MMOL/L (ref 4–16)
AST SERPL-CCNC: 25 IU/L (ref 15–37)
BASOPHILS ABSOLUTE: 0.1 K/CU MM
BASOPHILS RELATIVE PERCENT: 0.9 % (ref 0–1)
BILIRUB SERPL-MCNC: 1.1 MG/DL (ref 0–1)
BUN SERPL-MCNC: 48 MG/DL (ref 6–23)
CALCIUM SERPL-MCNC: 7.9 MG/DL (ref 8.3–10.6)
CHLORIDE BLD-SCNC: 99 MMOL/L (ref 99–110)
CO2: 26 MMOL/L (ref 21–32)
CREAT SERPL-MCNC: 2.5 MG/DL (ref 0.9–1.3)
DIFFERENTIAL TYPE: ABNORMAL
EOSINOPHILS ABSOLUTE: 0.7 K/CU MM
EOSINOPHILS RELATIVE PERCENT: 7.3 % (ref 0–3)
GFR SERPL CREATININE-BSD FRML MDRD: 23 ML/MIN/1.73M2
GLUCOSE SERPL-MCNC: 100 MG/DL (ref 70–99)
HCT VFR BLD CALC: 26.4 % (ref 42–52)
HEMOGLOBIN: 7.8 GM/DL (ref 13.5–18)
IMMATURE NEUTROPHIL %: 1.9 % (ref 0–0.43)
LYMPHOCYTES ABSOLUTE: 1.5 K/CU MM
LYMPHOCYTES RELATIVE PERCENT: 15.1 % (ref 24–44)
MCH RBC QN AUTO: 29.4 PG (ref 27–31)
MCHC RBC AUTO-ENTMCNC: 29.5 % (ref 32–36)
MCV RBC AUTO: 99.6 FL (ref 78–100)
MONOCYTES ABSOLUTE: 0.9 K/CU MM
MONOCYTES RELATIVE PERCENT: 8.8 % (ref 0–4)
NUCLEATED RBC %: 0 %
PDW BLD-RTO: 16.8 % (ref 11.7–14.9)
PLATELET # BLD: 313 K/CU MM (ref 140–440)
PMV BLD AUTO: 9.7 FL (ref 7.5–11.1)
POTASSIUM SERPL-SCNC: 3.9 MMOL/L (ref 3.5–5.1)
RBC # BLD: 2.65 M/CU MM (ref 4.6–6.2)
SEGMENTED NEUTROPHILS ABSOLUTE COUNT: 6.4 K/CU MM
SEGMENTED NEUTROPHILS RELATIVE PERCENT: 66 % (ref 36–66)
SODIUM BLD-SCNC: 139 MMOL/L (ref 135–145)
TOTAL IMMATURE NEUTOROPHIL: 0.18 K/CU MM
TOTAL NUCLEATED RBC: 0 K/CU MM
TOTAL PROTEIN: 5.2 GM/DL (ref 6.4–8.2)
WBC # BLD: 9.6 K/CU MM (ref 4–10.5)

## 2023-12-06 PROCEDURE — 36415 COLL VENOUS BLD VENIPUNCTURE: CPT

## 2023-12-06 PROCEDURE — 85025 COMPLETE CBC W/AUTO DIFF WBC: CPT

## 2023-12-06 PROCEDURE — 80053 COMPREHEN METABOLIC PANEL: CPT

## 2023-12-08 ENCOUNTER — HOSPITAL ENCOUNTER (EMERGENCY)
Age: 88
Discharge: HOME | End: 2023-12-08
Attending: STUDENT IN AN ORGANIZED HEALTH CARE EDUCATION/TRAINING PROGRAM
Payer: MEDICARE

## 2023-12-08 ENCOUNTER — APPOINTMENT (OUTPATIENT)
Dept: GENERAL RADIOLOGY | Age: 88
End: 2023-12-08
Payer: MEDICARE

## 2023-12-08 VITALS
SYSTOLIC BLOOD PRESSURE: 126 MMHG | HEIGHT: 69 IN | BODY MASS INDEX: 27.85 KG/M2 | HEART RATE: 82 BPM | OXYGEN SATURATION: 95 % | DIASTOLIC BLOOD PRESSURE: 55 MMHG | TEMPERATURE: 98.1 F | WEIGHT: 188 LBS | RESPIRATION RATE: 18 BRPM

## 2023-12-08 DIAGNOSIS — M25.551 RIGHT HIP PAIN: Primary | ICD-10-CM

## 2023-12-08 PROCEDURE — 6370000000 HC RX 637 (ALT 250 FOR IP): Performed by: STUDENT IN AN ORGANIZED HEALTH CARE EDUCATION/TRAINING PROGRAM

## 2023-12-08 PROCEDURE — 99283 EMERGENCY DEPT VISIT LOW MDM: CPT

## 2023-12-08 PROCEDURE — 73502 X-RAY EXAM HIP UNI 2-3 VIEWS: CPT

## 2023-12-08 RX ORDER — OXYCODONE HYDROCHLORIDE AND ACETAMINOPHEN 5; 325 MG/1; MG/1
1 TABLET ORAL ONCE
Status: COMPLETED | OUTPATIENT
Start: 2023-12-08 | End: 2023-12-08

## 2023-12-08 RX ADMIN — OXYCODONE HYDROCHLORIDE AND ACETAMINOPHEN 1 TABLET: 5; 325 TABLET ORAL at 06:36

## 2023-12-08 ASSESSMENT — ENCOUNTER SYMPTOMS
ABDOMINAL PAIN: 0
NAUSEA: 0
SHORTNESS OF BREATH: 0
VOMITING: 0

## 2023-12-08 ASSESSMENT — PAIN SCALES - GENERAL: PAINLEVEL_OUTOF10: 9

## 2023-12-08 NOTE — ED PROVIDER NOTES
[facility]  Emergency Department Encounter    Pt Name:Westley Patricio  MRN: [id number]  Birthdate 1926  Date of evaluation: 23  PCP:  Rivka Sutton MD       CHIEF COMPLAINT       Chief Complaint   Patient presents with    Hip Pain     right       HISTORY OF PRESENT ILLNESS     Adri Garcia is a 80 y.o. male who presents with acute on chronic right hip pain. Patient had a right intramedullary nail placed by orthopedic surgery last month after mechanical fall. He is currently residing at a nursing home. He states that when one of the staff was cleaning him up \"down there \"moved his leg aggressively and he had acute pain in his right hip. He states that he has been on Percocet chronically even before the surgery states he still has pain in his right hip at baseline. He reports that \"they do not give opiates like they used to do \". He denies any fever or chills. No chest pain or shortness of breath. PAST MEDICAL / SURGICAL / SOCIAL / FAMILY HISTORY      has a past medical history of Arthritis, CAD (coronary artery disease), H/O Doppler carotid ultrasound, H/O echocardiogram, Hx of Doppler echocardiogram, Hyperlipidemia, Hypertension, and Spinal stenosis. has a past surgical history that includes Coronary angioplasty with stent; Cataract removal; Carotid endarterectomy; pacemaker placement; Cholecystectomy; back surgery; Pacemaker Change (Left, 2022); and hip surgery (Right, 2023).     Social History     Socioeconomic History    Marital status:      Spouse name: Not on file    Number of children: Not on file    Years of education: Not on file    Highest education level: Not on file   Occupational History    Not on file   Tobacco Use    Smoking status: Former     Packs/day: 1     Types: Cigarettes     Quit date: 1983     Years since quittin.9    Smokeless tobacco: Never   Vaping Use    Vaping

## 2023-12-08 NOTE — ED NOTES
Report given to [facility], care transferred at this time.       Lien Miller RN  12/08/23 9080

## 2023-12-08 NOTE — DISCHARGE INSTRUCTIONS
You are seen here today for hip pain your x-ray does not show any abnormality of your hip. Please continue using physical therapy to regain your strength. Follow-up with your orthopedic surgeon as needed. Return the emergency department for any shortness of breath, chest pain increasing leg swelling new worsening concerning complaints.

## 2023-12-13 ENCOUNTER — OFFICE VISIT (OUTPATIENT)
Dept: ORTHOPEDIC SURGERY | Age: 88
End: 2023-12-13

## 2023-12-13 VITALS
HEIGHT: 69 IN | OXYGEN SATURATION: 97 % | HEART RATE: 74 BPM | WEIGHT: 188 LBS | RESPIRATION RATE: 16 BRPM | TEMPERATURE: 97.6 F | BODY MASS INDEX: 27.85 KG/M2

## 2023-12-13 DIAGNOSIS — Z09 POSTOP CHECK: Primary | ICD-10-CM

## 2023-12-13 DIAGNOSIS — S72.141A INTERTROCHANTERIC FRACTURE OF RIGHT FEMUR, CLOSED, INITIAL ENCOUNTER (HCC): ICD-10-CM

## 2023-12-13 PROCEDURE — 99024 POSTOP FOLLOW-UP VISIT: CPT | Performed by: PHYSICIAN ASSISTANT

## 2023-12-13 NOTE — PATIENT INSTRUCTIONS
Continue weight-bearing as tolerated. Continue range of motion exercises as instructed. Ice and elevate as needed. Tylenol or Motrin for pain.   Follow up in 4 weeks
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
## 2023-12-26 ENCOUNTER — HOSPITAL ENCOUNTER (OUTPATIENT)
Age: 88
Setting detail: SPECIMEN
Discharge: HOME | End: 2023-12-26
Payer: MEDICARE

## 2023-12-26 LAB
ALBUMIN SERPL-MCNC: 2.8 GM/DL (ref 3.4–5)
ALP BLD-CCNC: 171 IU/L (ref 40–128)
ALT SERPL-CCNC: 13 U/L (ref 10–40)
ANION GAP SERPL CALCULATED.3IONS-SCNC: 19 MMOL/L (ref 7–16)
AST SERPL-CCNC: 21 IU/L (ref 15–37)
BILIRUB SERPL-MCNC: 0.7 MG/DL (ref 0–1)
BUN SERPL-MCNC: 48 MG/DL (ref 6–23)
CALCIUM SERPL-MCNC: 8.2 MG/DL (ref 8.3–10.6)
CHLORIDE BLD-SCNC: 92 MMOL/L (ref 99–110)
CO2: 26 MMOL/L (ref 21–32)
CREAT SERPL-MCNC: 2.7 MG/DL (ref 0.9–1.3)
GFR SERPL CREATININE-BSD FRML MDRD: 21 ML/MIN/1.73M2
GLUCOSE SERPL-MCNC: 86 MG/DL (ref 70–99)
HCT VFR BLD CALC: 31 % (ref 42–52)
HEMOGLOBIN: 9.3 GM/DL (ref 13.5–18)
MCH RBC QN AUTO: 27.8 PG (ref 27–31)
MCHC RBC AUTO-ENTMCNC: 30 % (ref 32–36)
MCV RBC AUTO: 92.8 FL (ref 78–100)
PDW BLD-RTO: 17 % (ref 11.7–14.9)
PLATELET # BLD: 385 K/CU MM (ref 140–440)
PMV BLD AUTO: 10.3 FL (ref 7.5–11.1)
POTASSIUM SERPL-SCNC: 4.6 MMOL/L (ref 3.5–5.1)
RBC # BLD: 3.34 M/CU MM (ref 4.6–6.2)
SODIUM BLD-SCNC: 137 MMOL/L (ref 135–145)
TOTAL PROTEIN: 5.7 GM/DL (ref 6.4–8.2)
WBC # BLD: 24.7 K/CU MM (ref 4–10.5)

## 2023-12-26 PROCEDURE — 36415 COLL VENOUS BLD VENIPUNCTURE: CPT

## 2023-12-26 PROCEDURE — 80053 COMPREHEN METABOLIC PANEL: CPT

## 2023-12-26 PROCEDURE — 85027 COMPLETE CBC AUTOMATED: CPT

## 2023-12-27 ENCOUNTER — APPOINTMENT (OUTPATIENT)
Dept: ULTRASOUND IMAGING | Age: 88
DRG: 854 | End: 2023-12-27
Payer: MEDICARE

## 2023-12-27 ENCOUNTER — HOSPITAL ENCOUNTER (INPATIENT)
Age: 88
LOS: 13 days | Discharge: NURSING, REHAB OR LONG-TERM CARE | DRG: 854 | End: 2024-01-10
Attending: STUDENT IN AN ORGANIZED HEALTH CARE EDUCATION/TRAINING PROGRAM | Admitting: STUDENT IN AN ORGANIZED HEALTH CARE EDUCATION/TRAINING PROGRAM
Payer: MEDICARE

## 2023-12-27 DIAGNOSIS — L03.119 CELLULITIS AND ABSCESS OF LEG: Primary | ICD-10-CM

## 2023-12-27 DIAGNOSIS — L02.415 ABSCESS OF RIGHT LEG: ICD-10-CM

## 2023-12-27 DIAGNOSIS — N18.9 CHRONIC KIDNEY DISEASE, UNSPECIFIED CKD STAGE: ICD-10-CM

## 2023-12-27 DIAGNOSIS — L02.419 CELLULITIS AND ABSCESS OF LEG: Primary | ICD-10-CM

## 2023-12-27 DIAGNOSIS — L02.91 ABSCESS: ICD-10-CM

## 2023-12-27 DIAGNOSIS — I82.401 ACUTE DEEP VEIN THROMBOSIS (DVT) OF RIGHT LOWER EXTREMITY, UNSPECIFIED VEIN (HCC): ICD-10-CM

## 2023-12-27 LAB
ALBUMIN SERPL-MCNC: 2.5 GM/DL (ref 3.4–5)
ALP BLD-CCNC: 143 IU/L (ref 40–129)
ALT SERPL-CCNC: 14 U/L (ref 10–40)
ANION GAP SERPL CALCULATED.3IONS-SCNC: 15 MMOL/L (ref 7–16)
ANISOCYTOSIS: ABNORMAL
AST SERPL-CCNC: 26 IU/L (ref 15–37)
BILIRUB SERPL-MCNC: 0.8 MG/DL (ref 0–1)
BUN SERPL-MCNC: 42 MG/DL (ref 6–23)
CALCIUM SERPL-MCNC: 8.5 MG/DL (ref 8.3–10.6)
CHLORIDE BLD-SCNC: 96 MMOL/L (ref 99–110)
CO2: 24 MMOL/L (ref 21–32)
CREAT SERPL-MCNC: 2.1 MG/DL (ref 0.9–1.3)
D DIMER: 8.11 UG/ML (FEU)
DIFFERENTIAL TYPE: ABNORMAL
ERYTHROCYTE SEDIMENTATION RATE: 85 MM/HR (ref 0–20)
GFR SERPL CREATININE-BSD FRML MDRD: 28 ML/MIN/1.73M2
GLUCOSE SERPL-MCNC: 128 MG/DL (ref 70–99)
HCT VFR BLD CALC: 29.9 % (ref 42–52)
HEMOGLOBIN: 9 GM/DL (ref 13.5–18)
LACTIC ACID, SEPSIS: 1.9 MMOL/L (ref 0.4–2)
LYMPHOCYTES ABSOLUTE: 2.1 K/CU MM
LYMPHOCYTES RELATIVE PERCENT: 9 % (ref 24–44)
MCH RBC QN AUTO: 27.9 PG (ref 27–31)
MCHC RBC AUTO-ENTMCNC: 30.1 % (ref 32–36)
MCV RBC AUTO: 92.6 FL (ref 78–100)
METAMYELOCYTES ABSOLUTE COUNT: 0.24 K/CU MM
METAMYELOCYTES PERCENT: 1 %
MONOCYTES ABSOLUTE: 1.2 K/CU MM
MONOCYTES RELATIVE PERCENT: 5 % (ref 0–4)
PDW BLD-RTO: 17.1 % (ref 11.7–14.9)
PLATELET # BLD: 364 K/CU MM (ref 140–440)
PMV BLD AUTO: 9.8 FL (ref 7.5–11.1)
POTASSIUM SERPL-SCNC: 3.5 MMOL/L (ref 3.5–5.1)
RBC # BLD: 3.23 M/CU MM (ref 4.6–6.2)
RBC # BLD: ABNORMAL 10*6/UL
SEGMENTED NEUTROPHILS ABSOLUTE COUNT: 20.1 K/CU MM
SEGMENTED NEUTROPHILS RELATIVE PERCENT: 85 % (ref 36–66)
SODIUM BLD-SCNC: 135 MMOL/L (ref 135–145)
TOTAL CK: 21 IU/L (ref 38–174)
TOTAL PROTEIN: 6.7 GM/DL (ref 6.4–8.2)
WBC # BLD: 23.6 K/CU MM (ref 4–10.5)

## 2023-12-27 PROCEDURE — 85007 BL SMEAR W/DIFF WBC COUNT: CPT

## 2023-12-27 PROCEDURE — 99285 EMERGENCY DEPT VISIT HI MDM: CPT

## 2023-12-27 PROCEDURE — 87040 BLOOD CULTURE FOR BACTERIA: CPT

## 2023-12-27 PROCEDURE — 85379 FIBRIN DEGRADATION QUANT: CPT

## 2023-12-27 PROCEDURE — 87070 CULTURE OTHR SPECIMN AEROBIC: CPT

## 2023-12-27 PROCEDURE — 85652 RBC SED RATE AUTOMATED: CPT

## 2023-12-27 PROCEDURE — 82550 ASSAY OF CK (CPK): CPT

## 2023-12-27 PROCEDURE — 87186 SC STD MICRODIL/AGAR DIL: CPT

## 2023-12-27 PROCEDURE — 80053 COMPREHEN METABOLIC PANEL: CPT

## 2023-12-27 PROCEDURE — 86140 C-REACTIVE PROTEIN: CPT

## 2023-12-27 PROCEDURE — 93970 EXTREMITY STUDY: CPT

## 2023-12-27 PROCEDURE — 6370000000 HC RX 637 (ALT 250 FOR IP): Performed by: STUDENT IN AN ORGANIZED HEALTH CARE EDUCATION/TRAINING PROGRAM

## 2023-12-27 PROCEDURE — 96365 THER/PROPH/DIAG IV INF INIT: CPT

## 2023-12-27 PROCEDURE — 86403 PARTICLE AGGLUT ANTBDY SCRN: CPT

## 2023-12-27 PROCEDURE — 87075 CULTR BACTERIA EXCEPT BLOOD: CPT

## 2023-12-27 PROCEDURE — 2580000003 HC RX 258: Performed by: STUDENT IN AN ORGANIZED HEALTH CARE EDUCATION/TRAINING PROGRAM

## 2023-12-27 PROCEDURE — 87077 CULTURE AEROBIC IDENTIFY: CPT

## 2023-12-27 PROCEDURE — 85027 COMPLETE CBC AUTOMATED: CPT

## 2023-12-27 PROCEDURE — 6360000002 HC RX W HCPCS: Performed by: STUDENT IN AN ORGANIZED HEALTH CARE EDUCATION/TRAINING PROGRAM

## 2023-12-27 PROCEDURE — 83605 ASSAY OF LACTIC ACID: CPT

## 2023-12-27 RX ORDER — OXYCODONE HYDROCHLORIDE AND ACETAMINOPHEN 5; 325 MG/1; MG/1
1 TABLET ORAL ONCE
Status: COMPLETED | OUTPATIENT
Start: 2023-12-27 | End: 2023-12-27

## 2023-12-27 RX ADMIN — OXYCODONE HYDROCHLORIDE AND ACETAMINOPHEN 1 TABLET: 5; 325 TABLET ORAL at 22:48

## 2023-12-27 RX ADMIN — CEFEPIME 2000 MG: 2 INJECTION, POWDER, FOR SOLUTION INTRAVENOUS at 23:02

## 2023-12-27 RX ADMIN — VANCOMYCIN HYDROCHLORIDE 2000 MG: 1 INJECTION, POWDER, LYOPHILIZED, FOR SOLUTION INTRAVENOUS at 23:37

## 2023-12-27 ASSESSMENT — PAIN DESCRIPTION - LOCATION
LOCATION: LEG
LOCATION: LEG

## 2023-12-27 ASSESSMENT — PAIN SCALES - GENERAL
PAINLEVEL_OUTOF10: 8
PAINLEVEL_OUTOF10: 8

## 2023-12-27 ASSESSMENT — PAIN DESCRIPTION - ORIENTATION
ORIENTATION: RIGHT
ORIENTATION: RIGHT

## 2023-12-27 ASSESSMENT — LIFESTYLE VARIABLES
HOW MANY STANDARD DRINKS CONTAINING ALCOHOL DO YOU HAVE ON A TYPICAL DAY: PATIENT DOES NOT DRINK
HOW OFTEN DO YOU HAVE A DRINK CONTAINING ALCOHOL: NEVER

## 2023-12-28 ENCOUNTER — APPOINTMENT (OUTPATIENT)
Dept: CT IMAGING | Age: 88
DRG: 854 | End: 2023-12-28
Payer: MEDICARE

## 2023-12-28 PROBLEM — L02.415 ABSCESS OF RIGHT LEG: Status: ACTIVE | Noted: 2023-12-28

## 2023-12-28 LAB
ALBUMIN SERPL-MCNC: 2.4 GM/DL (ref 3.4–5)
ALP BLD-CCNC: 123 IU/L (ref 40–129)
ALT SERPL-CCNC: 13 U/L (ref 10–40)
ANION GAP SERPL CALCULATED.3IONS-SCNC: 11 MMOL/L (ref 7–16)
ANISOCYTOSIS: ABNORMAL
ANTI-XA UNFRAC HEPARIN: 0.41 IU/ML (ref 0.3–0.7)
ANTI-XA UNFRAC HEPARIN: 0.41 IU/ML (ref 0.3–0.7)
ANTI-XA UNFRAC HEPARIN: 0.51 IU/ML (ref 0.3–0.7)
ANTI-XA UNFRAC HEPARIN: <0.1 IU/ML (ref 0.3–0.7)
APTT: 37.3 SECONDS (ref 25.1–37.1)
AST SERPL-CCNC: 23 IU/L (ref 15–37)
BANDED NEUTROPHILS ABSOLUTE COUNT: 1.65 K/CU MM
BANDED NEUTROPHILS RELATIVE PERCENT: 8 % (ref 5–11)
BASOPHILS ABSOLUTE: 0.2 K/CU MM
BASOPHILS RELATIVE PERCENT: 1 % (ref 0–1)
BILIRUB SERPL-MCNC: 0.7 MG/DL (ref 0–1)
BUN SERPL-MCNC: 41 MG/DL (ref 6–23)
CALCIUM SERPL-MCNC: 8 MG/DL (ref 8.3–10.6)
CHLORIDE BLD-SCNC: 99 MMOL/L (ref 99–110)
CO2: 27 MMOL/L (ref 21–32)
CREAT SERPL-MCNC: 2.2 MG/DL (ref 0.9–1.3)
CRP SERPL HS-MCNC: 227.3 MG/L
DIFFERENTIAL TYPE: ABNORMAL
GFR SERPL CREATININE-BSD FRML MDRD: 27 ML/MIN/1.73M2
GLUCOSE SERPL-MCNC: 117 MG/DL (ref 70–99)
HCT VFR BLD CALC: 26.7 % (ref 42–52)
HCT VFR BLD CALC: 29.9 % (ref 42–52)
HEMOGLOBIN: 7.9 GM/DL (ref 13.5–18)
HEMOGLOBIN: 9 GM/DL (ref 13.5–18)
HYPOCHROMIA: ABNORMAL
INR BLD: 1.3 INDEX
INR BLD: 1.5 INDEX
LACTIC ACID, SEPSIS: 1.8 MMOL/L (ref 0.4–2)
LYMPHOCYTES ABSOLUTE: 2.1 K/CU MM
LYMPHOCYTES RELATIVE PERCENT: 10 % (ref 24–44)
MAGNESIUM: 2 MG/DL (ref 1.8–2.4)
MCH RBC QN AUTO: 27.1 PG (ref 27–31)
MCH RBC QN AUTO: 27.6 PG (ref 27–31)
MCHC RBC AUTO-ENTMCNC: 29.6 % (ref 32–36)
MCHC RBC AUTO-ENTMCNC: 30.1 % (ref 32–36)
MCV RBC AUTO: 91.7 FL (ref 78–100)
MCV RBC AUTO: 91.8 FL (ref 78–100)
METAMYELOCYTES ABSOLUTE COUNT: 0.41 K/CU MM
METAMYELOCYTES PERCENT: 2 %
MONOCYTES ABSOLUTE: 1 K/CU MM
MONOCYTES RELATIVE PERCENT: 5 % (ref 0–4)
PDW BLD-RTO: 17.2 % (ref 11.7–14.9)
PDW BLD-RTO: 17.2 % (ref 11.7–14.9)
PLATELET # BLD: 359 K/CU MM (ref 140–440)
PLATELET # BLD: 385 K/CU MM (ref 140–440)
PMV BLD AUTO: 9.7 FL (ref 7.5–11.1)
PMV BLD AUTO: 9.7 FL (ref 7.5–11.1)
POTASSIUM SERPL-SCNC: 3.2 MMOL/L (ref 3.5–5.1)
PROTHROMBIN TIME: 16 SECONDS (ref 11.7–14.5)
PROTHROMBIN TIME: 18.5 SECONDS (ref 11.7–14.5)
RBC # BLD: 2.91 M/CU MM (ref 4.6–6.2)
RBC # BLD: 3.26 M/CU MM (ref 4.6–6.2)
SEGMENTED NEUTROPHILS ABSOLUTE COUNT: 15.2 K/CU MM
SEGMENTED NEUTROPHILS RELATIVE PERCENT: 74 % (ref 36–66)
SODIUM BLD-SCNC: 137 MMOL/L (ref 135–145)
TOTAL PROTEIN: 6 GM/DL (ref 6.4–8.2)
WBC # BLD: 20.6 K/CU MM (ref 4–10.5)
WBC # BLD: 23.7 K/CU MM (ref 4–10.5)

## 2023-12-28 PROCEDURE — 2580000003 HC RX 258: Performed by: STUDENT IN AN ORGANIZED HEALTH CARE EDUCATION/TRAINING PROGRAM

## 2023-12-28 PROCEDURE — 6370000000 HC RX 637 (ALT 250 FOR IP): Performed by: STUDENT IN AN ORGANIZED HEALTH CARE EDUCATION/TRAINING PROGRAM

## 2023-12-28 PROCEDURE — 99222 1ST HOSP IP/OBS MODERATE 55: CPT | Performed by: SURGERY

## 2023-12-28 PROCEDURE — 80053 COMPREHEN METABOLIC PANEL: CPT

## 2023-12-28 PROCEDURE — 2140000000 HC CCU INTERMEDIATE R&B

## 2023-12-28 PROCEDURE — 85520 HEPARIN ASSAY: CPT

## 2023-12-28 PROCEDURE — 96366 THER/PROPH/DIAG IV INF ADDON: CPT

## 2023-12-28 PROCEDURE — 85610 PROTHROMBIN TIME: CPT

## 2023-12-28 PROCEDURE — 85730 THROMBOPLASTIN TIME PARTIAL: CPT

## 2023-12-28 PROCEDURE — 36415 COLL VENOUS BLD VENIPUNCTURE: CPT

## 2023-12-28 PROCEDURE — 2500000003 HC RX 250 WO HCPCS: Performed by: STUDENT IN AN ORGANIZED HEALTH CARE EDUCATION/TRAINING PROGRAM

## 2023-12-28 PROCEDURE — 73700 CT LOWER EXTREMITY W/O DYE: CPT

## 2023-12-28 PROCEDURE — 83735 ASSAY OF MAGNESIUM: CPT

## 2023-12-28 PROCEDURE — 96368 THER/DIAG CONCURRENT INF: CPT

## 2023-12-28 PROCEDURE — 85007 BL SMEAR W/DIFF WBC COUNT: CPT

## 2023-12-28 PROCEDURE — 85027 COMPLETE CBC AUTOMATED: CPT

## 2023-12-28 PROCEDURE — 6360000002 HC RX W HCPCS: Performed by: STUDENT IN AN ORGANIZED HEALTH CARE EDUCATION/TRAINING PROGRAM

## 2023-12-28 PROCEDURE — 83605 ASSAY OF LACTIC ACID: CPT

## 2023-12-28 RX ORDER — HEPARIN SODIUM 1000 [USP'U]/ML
80 INJECTION, SOLUTION INTRAVENOUS; SUBCUTANEOUS ONCE
Status: COMPLETED | OUTPATIENT
Start: 2023-12-28 | End: 2023-12-28

## 2023-12-28 RX ORDER — SODIUM CHLORIDE, SODIUM LACTATE, POTASSIUM CHLORIDE, CALCIUM CHLORIDE 600; 310; 30; 20 MG/100ML; MG/100ML; MG/100ML; MG/100ML
INJECTION, SOLUTION INTRAVENOUS CONTINUOUS
Status: DISCONTINUED | OUTPATIENT
Start: 2023-12-28 | End: 2023-12-31

## 2023-12-28 RX ORDER — ACETAMINOPHEN 325 MG/1
650 TABLET ORAL EVERY 6 HOURS PRN
Status: DISCONTINUED | OUTPATIENT
Start: 2023-12-28 | End: 2024-01-10 | Stop reason: HOSPADM

## 2023-12-28 RX ORDER — SODIUM CHLORIDE, SODIUM LACTATE, POTASSIUM CHLORIDE, CALCIUM CHLORIDE 600; 310; 30; 20 MG/100ML; MG/100ML; MG/100ML; MG/100ML
INJECTION, SOLUTION INTRAVENOUS CONTINUOUS
Status: DISCONTINUED | OUTPATIENT
Start: 2023-12-28 | End: 2023-12-28

## 2023-12-28 RX ORDER — CLINDAMYCIN PHOSPHATE 900 MG/50ML
900 INJECTION, SOLUTION INTRAVENOUS ONCE
Status: COMPLETED | OUTPATIENT
Start: 2023-12-28 | End: 2023-12-28

## 2023-12-28 RX ORDER — NITROGLYCERIN 0.4 MG/1
0.4 TABLET SUBLINGUAL EVERY 5 MIN PRN
Status: DISCONTINUED | OUTPATIENT
Start: 2023-12-28 | End: 2024-01-10 | Stop reason: HOSPADM

## 2023-12-28 RX ORDER — SODIUM CHLORIDE 9 MG/ML
INJECTION, SOLUTION INTRAVENOUS PRN
Status: DISCONTINUED | OUTPATIENT
Start: 2023-12-28 | End: 2024-01-10 | Stop reason: HOSPADM

## 2023-12-28 RX ORDER — HEPARIN SODIUM 1000 [USP'U]/ML
80 INJECTION, SOLUTION INTRAVENOUS; SUBCUTANEOUS PRN
Status: DISCONTINUED | OUTPATIENT
Start: 2023-12-28 | End: 2024-01-10

## 2023-12-28 RX ORDER — ONDANSETRON 4 MG/1
4 TABLET, ORALLY DISINTEGRATING ORAL EVERY 8 HOURS PRN
Status: DISCONTINUED | OUTPATIENT
Start: 2023-12-28 | End: 2024-01-10 | Stop reason: HOSPADM

## 2023-12-28 RX ORDER — SODIUM CHLORIDE 0.9 % (FLUSH) 0.9 %
5-40 SYRINGE (ML) INJECTION EVERY 12 HOURS SCHEDULED
Status: DISCONTINUED | OUTPATIENT
Start: 2023-12-28 | End: 2024-01-10 | Stop reason: HOSPADM

## 2023-12-28 RX ORDER — DOXYCYCLINE HYCLATE 100 MG/1
100 CAPSULE ORAL 2 TIMES DAILY
Status: ON HOLD | COMMUNITY
End: 2024-01-10 | Stop reason: HOSPADM

## 2023-12-28 RX ORDER — TRAZODONE HYDROCHLORIDE 50 MG/1
50 TABLET ORAL NIGHTLY
Status: DISCONTINUED | OUTPATIENT
Start: 2023-12-28 | End: 2024-01-10 | Stop reason: HOSPADM

## 2023-12-28 RX ORDER — HEPARIN SODIUM 1000 [USP'U]/ML
60 INJECTION, SOLUTION INTRAVENOUS; SUBCUTANEOUS PRN
Status: DISCONTINUED | OUTPATIENT
Start: 2023-12-28 | End: 2023-12-28

## 2023-12-28 RX ORDER — EZETIMIBE 10 MG/1
10 TABLET ORAL NIGHTLY
Status: DISCONTINUED | OUTPATIENT
Start: 2023-12-28 | End: 2024-01-10 | Stop reason: HOSPADM

## 2023-12-28 RX ORDER — HEPARIN SODIUM 10000 [USP'U]/100ML
5-30 INJECTION, SOLUTION INTRAVENOUS CONTINUOUS
Status: DISCONTINUED | OUTPATIENT
Start: 2023-12-28 | End: 2023-12-28

## 2023-12-28 RX ORDER — POLYETHYLENE GLYCOL 3350 17 G/17G
17 POWDER, FOR SOLUTION ORAL DAILY PRN
Status: DISCONTINUED | OUTPATIENT
Start: 2023-12-28 | End: 2024-01-10 | Stop reason: HOSPADM

## 2023-12-28 RX ORDER — ROPINIROLE 0.25 MG/1
0.75 TABLET, FILM COATED ORAL NIGHTLY
Status: DISCONTINUED | OUTPATIENT
Start: 2023-12-28 | End: 2024-01-10 | Stop reason: HOSPADM

## 2023-12-28 RX ORDER — ACETAMINOPHEN 650 MG/1
650 SUPPOSITORY RECTAL EVERY 6 HOURS PRN
Status: DISCONTINUED | OUTPATIENT
Start: 2023-12-28 | End: 2024-01-10 | Stop reason: HOSPADM

## 2023-12-28 RX ORDER — HEPARIN SODIUM 1000 [USP'U]/ML
30 INJECTION, SOLUTION INTRAVENOUS; SUBCUTANEOUS PRN
Status: DISCONTINUED | OUTPATIENT
Start: 2023-12-28 | End: 2023-12-28

## 2023-12-28 RX ORDER — HEPARIN SODIUM 10000 [USP'U]/100ML
5-30 INJECTION, SOLUTION INTRAVENOUS CONTINUOUS
Status: DISCONTINUED | OUTPATIENT
Start: 2023-12-28 | End: 2024-01-10

## 2023-12-28 RX ORDER — POTASSIUM CHLORIDE 20 MEQ/1
20 TABLET, EXTENDED RELEASE ORAL ONCE
Status: COMPLETED | OUTPATIENT
Start: 2023-12-28 | End: 2023-12-28

## 2023-12-28 RX ORDER — SODIUM CHLORIDE 0.9 % (FLUSH) 0.9 %
5-40 SYRINGE (ML) INJECTION PRN
Status: DISCONTINUED | OUTPATIENT
Start: 2023-12-28 | End: 2024-01-10 | Stop reason: HOSPADM

## 2023-12-28 RX ORDER — ONDANSETRON 2 MG/ML
4 INJECTION INTRAMUSCULAR; INTRAVENOUS EVERY 6 HOURS PRN
Status: DISCONTINUED | OUTPATIENT
Start: 2023-12-28 | End: 2024-01-10 | Stop reason: HOSPADM

## 2023-12-28 RX ORDER — HEPARIN SODIUM 1000 [USP'U]/ML
60 INJECTION, SOLUTION INTRAVENOUS; SUBCUTANEOUS ONCE
Status: DISCONTINUED | OUTPATIENT
Start: 2023-12-28 | End: 2023-12-28

## 2023-12-28 RX ORDER — CLINDAMYCIN PHOSPHATE 900 MG/50ML
900 INJECTION, SOLUTION INTRAVENOUS EVERY 8 HOURS
Status: DISCONTINUED | OUTPATIENT
Start: 2023-12-28 | End: 2023-12-29

## 2023-12-28 RX ORDER — HEPARIN SODIUM 1000 [USP'U]/ML
40 INJECTION, SOLUTION INTRAVENOUS; SUBCUTANEOUS PRN
Status: DISCONTINUED | OUTPATIENT
Start: 2023-12-28 | End: 2024-01-10

## 2023-12-28 RX ORDER — METOPROLOL SUCCINATE 25 MG/1
12.5 TABLET, EXTENDED RELEASE ORAL DAILY
Status: DISCONTINUED | OUTPATIENT
Start: 2023-12-28 | End: 2024-01-10 | Stop reason: HOSPADM

## 2023-12-28 RX ORDER — SODIUM CHLORIDE, SODIUM LACTATE, POTASSIUM CHLORIDE, AND CALCIUM CHLORIDE .6; .31; .03; .02 G/100ML; G/100ML; G/100ML; G/100ML
1000 INJECTION, SOLUTION INTRAVENOUS ONCE
Status: COMPLETED | OUTPATIENT
Start: 2023-12-28 | End: 2023-12-28

## 2023-12-28 RX ADMIN — CLINDAMYCIN IN 5 PERCENT DEXTROSE 900 MG: 18 INJECTION, SOLUTION INTRAVENOUS at 15:23

## 2023-12-28 RX ADMIN — ROPINIROLE HYDROCHLORIDE 0.75 MG: 0.25 TABLET, FILM COATED ORAL at 21:17

## 2023-12-28 RX ADMIN — HEPARIN SODIUM 6530 UNITS: 1000 INJECTION INTRAVENOUS; SUBCUTANEOUS at 01:22

## 2023-12-28 RX ADMIN — POTASSIUM CHLORIDE 20 MEQ: 1500 TABLET, EXTENDED RELEASE ORAL at 11:25

## 2023-12-28 RX ADMIN — SODIUM CHLORIDE, POTASSIUM CHLORIDE, SODIUM LACTATE AND CALCIUM CHLORIDE 1000 ML: 600; 310; 30; 20 INJECTION, SOLUTION INTRAVENOUS at 06:38

## 2023-12-28 RX ADMIN — SODIUM CHLORIDE, POTASSIUM CHLORIDE, SODIUM LACTATE AND CALCIUM CHLORIDE: 600; 310; 30; 20 INJECTION, SOLUTION INTRAVENOUS at 23:10

## 2023-12-28 RX ADMIN — TRAZODONE HYDROCHLORIDE 50 MG: 50 TABLET ORAL at 21:17

## 2023-12-28 RX ADMIN — HEPARIN SODIUM 18 UNITS/KG/HR: 10000 INJECTION, SOLUTION INTRAVENOUS at 01:27

## 2023-12-28 RX ADMIN — SODIUM CHLORIDE, POTASSIUM CHLORIDE, SODIUM LACTATE AND CALCIUM CHLORIDE: 600; 310; 30; 20 INJECTION, SOLUTION INTRAVENOUS at 11:25

## 2023-12-28 RX ADMIN — CLINDAMYCIN PHOSPHATE 900 MG: 900 INJECTION, SOLUTION INTRAVENOUS at 05:07

## 2023-12-28 RX ADMIN — CEFEPIME 1000 MG: 1 INJECTION, POWDER, FOR SOLUTION INTRAMUSCULAR; INTRAVENOUS at 23:07

## 2023-12-28 RX ADMIN — EZETIMIBE 10 MG: 10 TABLET ORAL at 21:17

## 2023-12-28 RX ADMIN — HEPARIN SODIUM 18 UNITS/KG/HR: 10000 INJECTION, SOLUTION INTRAVENOUS at 18:34

## 2023-12-28 RX ADMIN — SODIUM CHLORIDE 25 ML: 9 INJECTION, SOLUTION INTRAVENOUS at 15:21

## 2023-12-28 RX ADMIN — SODIUM CHLORIDE, PRESERVATIVE FREE 10 ML: 5 INJECTION INTRAVENOUS at 21:18

## 2023-12-28 RX ADMIN — CLINDAMYCIN IN 5 PERCENT DEXTROSE 900 MG: 18 INJECTION, SOLUTION INTRAVENOUS at 21:20

## 2023-12-28 RX ADMIN — SODIUM CHLORIDE, POTASSIUM CHLORIDE, SODIUM LACTATE AND CALCIUM CHLORIDE: 600; 310; 30; 20 INJECTION, SOLUTION INTRAVENOUS at 09:28
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
## 2023-12-28 NOTE — ED PROVIDER NOTES
Transfer of Care Note:   Physician Signing out: Dr Frazier   Receiving Physician: Robe Luna MD  Sign out time: 1211a      Brief history:  89-year-old male presenting to the ED for wound check.  Had difficult wound to right lower extremity with purulent discharge expressed.  Neurovascularly intact.  Patient says been ongoing for about 4 weeks.  Denies any fevers.  Antibiotics given.  Labs and exam findings concerning for sepsis.    Items pending that need to be checked:   UltraSound and CT      Additional Assessment and results:   I have personally performed a face to face diagnostic evaluation on this patient. The patient's initial evaluation and plan have been discussed with the prior physician who initially evaluated the patient. Nursing Notes, Past Medical Hx, Past Surgical Hx, Social Hx, Allergies, vital signs and Family Hx were all reviewed.    Vitals:    12/28/23 0232   BP: 124/60   Pulse: 71   Resp: 22   Temp:    SpO2:        Physical Exam  Constitutional:       General: He is not in acute distress.     Appearance: Normal appearance. He is not ill-appearing, toxic-appearing or diaphoretic.   HENT:      Head: Normocephalic and atraumatic.      Right Ear: External ear normal.      Left Ear: External ear normal.   Eyes:      General: No scleral icterus.        Right eye: No discharge.         Left eye: No discharge.   Cardiovascular:      Rate and Rhythm: Normal rate.   Pulmonary:      Effort: Pulmonary effort is normal. No respiratory distress.   Musculoskeletal:         General: Tenderness and deformity present.      Cervical back: Neck supple.      Right lower leg: Edema present.      Left lower leg: No edema.      Comments: Erythematous lesion to right lower extremity tenderness warmth drainage and some swelling.  Sensation equal intact bilateral lower extremities.  Bilateral DP pulses equal.   Skin:     General: Skin is warm and dry.   Neurological:      Mental Status: He is alert and oriented to 
 Order Specific Question:   Other Abx Indication     Answer:   Suspected Sepsis of Skin or Soft Tissue Origin    oxyCODONE-acetaminophen (PERCOCET) 5-325 MG per tablet 1 tablet    DISCONTD: heparin (porcine) injection 4,900 Units    DISCONTD: heparin (porcine) injection 4,900 Units    DISCONTD: heparin (porcine) injection 2,450 Units    DISCONTD: heparin 25,000 unit in sodium chloride 0.45% 250 mL (premix) infusion    heparin (porcine) injection 6,530 Units    heparin (porcine) injection 6,530 Units    heparin (porcine) injection 3,260 Units    heparin 25,000 unit in sodium chloride 0.45% 250 mL (premix) infusion    DISCONTD: clindamycin (CLEOCIN) 900 mg in dextrose 5% 50 mL IVPB     Order Specific Question:   Antimicrobial Indications     Answer:   Skin and Soft Tissue Infection    clindamycin (CLEOCIN) 900 mg in dextrose 5 % 50 mL IVPB     Order Specific Question:   Antimicrobial Indications     Answer:   Skin and Soft Tissue Infection    lactated ringers bolus bolus 1,000 mL    ezetimibe (ZETIA) tablet 10 mg    nitroGLYCERIN (NITROSTAT) SL tablet 0.4 mg    traZODone (DESYREL) tablet 50 mg    rOPINIRole (REQUIP) tablet 0.75 mg    metoprolol succinate (TOPROL XL) extended release tablet 12.5 mg    sodium chloride flush 0.9 % injection 5-40 mL    sodium chloride flush 0.9 % injection 5-40 mL    0.9 % sodium chloride infusion    OR Linked Order Group     ondansetron (ZOFRAN-ODT) disintegrating tablet 4 mg     ondansetron (ZOFRAN) injection 4 mg    polyethylene glycol (GLYCOLAX) packet 17 g    OR Linked Order Group     acetaminophen (TYLENOL) tablet 650 mg     acetaminophen (TYLENOL) suppository 650 mg    DISCONTD: lactated ringers IV soln infusion    DISCONTD: ceFEPIme (MAXIPIME) 2,000 mg in sodium chloride 0.9 % 100 mL IVPB (mini-bag)     Order Specific Question:   Antimicrobial Indications     Answer:   Skin and Soft Tissue Infection     Order Specific Question:   Skin duration of therapy     Answer:   5 days
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
## 2023-12-28 NOTE — ED NOTES
Patient bed alarm sounded, patient at the end of the bed, IV in left hand removed. Reinforced importance of remaining in bed to patient. Patient verbalized understanding, patient requested to speak to daughter, advised patient of time and that daughter knows he is here as she was here earlier.

## 2023-12-28 NOTE — ACP (ADVANCE CARE PLANNING)
Patient does not have any ACP documents/Medical Power of .     LSW notes hospital will follow [place] Next of Kin hierarchy in the following descending order for priority:    Guardian  Spouse  Majority of adult Children  Parents  Majority of adult Siblings  Nearest Relative not described above    Per [place] Next of Kin hierarchy: Patients' spouse will be Primary Healthcare Decision Maker.

## 2023-12-28 NOTE — H&P
noted within the right peroneal   vein, as well as superficial thrombus within the right profundus femoris vein.   2. No evidence of DVT within the visualized left lower extremity.             Aakash Murphy MD  12/28/23 4:32 AM

## 2023-12-29 ENCOUNTER — ANESTHESIA (OUTPATIENT)
Dept: OPERATING ROOM | Age: 88
End: 2023-12-29
Payer: MEDICARE

## 2023-12-29 ENCOUNTER — ANESTHESIA EVENT (OUTPATIENT)
Dept: OPERATING ROOM | Age: 88
End: 2023-12-29
Payer: MEDICARE

## 2023-12-29 PROBLEM — L02.415 CELLULITIS AND ABSCESS OF RIGHT LOWER EXTREMITY: Status: ACTIVE | Noted: 2023-12-29

## 2023-12-29 PROBLEM — L03.115 CELLULITIS AND ABSCESS OF RIGHT LOWER EXTREMITY: Status: ACTIVE | Noted: 2023-12-29

## 2023-12-29 LAB
ALBUMIN SERPL-MCNC: 2.4 GM/DL (ref 3.4–5)
ALP BLD-CCNC: 111 IU/L (ref 40–129)
ALT SERPL-CCNC: 14 U/L (ref 10–40)
ANION GAP SERPL CALCULATED.3IONS-SCNC: 12 MMOL/L (ref 7–16)
ANISOCYTOSIS: ABNORMAL
ANTI-XA UNFRAC HEPARIN: 0.41 IU/ML (ref 0.3–0.7)
AST SERPL-CCNC: 29 IU/L (ref 15–37)
BANDED NEUTROPHILS ABSOLUTE COUNT: 1.6 K/CU MM
BANDED NEUTROPHILS RELATIVE PERCENT: 9 % (ref 5–11)
BASOPHILS ABSOLUTE: 0.2 K/CU MM
BASOPHILS RELATIVE PERCENT: 1 % (ref 0–1)
BILIRUB SERPL-MCNC: 0.6 MG/DL (ref 0–1)
BUN SERPL-MCNC: 30 MG/DL (ref 6–23)
CALCIUM SERPL-MCNC: 8 MG/DL (ref 8.3–10.6)
CHLORIDE BLD-SCNC: 102 MMOL/L (ref 99–110)
CO2: 23 MMOL/L (ref 21–32)
CREAT SERPL-MCNC: 1.9 MG/DL (ref 0.9–1.3)
DIFFERENTIAL TYPE: ABNORMAL
DOSE AMOUNT: NORMAL
DOSE TIME: NORMAL
EOSINOPHILS ABSOLUTE: 0.4 K/CU MM
EOSINOPHILS RELATIVE PERCENT: 2 % (ref 0–3)
GFR SERPL CREATININE-BSD FRML MDRD: 32 ML/MIN/1.73M2
GLUCOSE SERPL-MCNC: 109 MG/DL (ref 70–99)
HCT VFR BLD CALC: 26.4 % (ref 42–52)
HCT VFR BLD CALC: 30.2 % (ref 42–52)
HEMOGLOBIN: 7.8 GM/DL (ref 13.5–18)
HEMOGLOBIN: 8.6 GM/DL (ref 13.5–18)
LYMPHOCYTES ABSOLUTE: 2 K/CU MM
LYMPHOCYTES RELATIVE PERCENT: 11 % (ref 24–44)
MCH RBC QN AUTO: 27.5 PG (ref 27–31)
MCHC RBC AUTO-ENTMCNC: 28.5 % (ref 32–36)
MCV RBC AUTO: 96.5 FL (ref 78–100)
METAMYELOCYTES ABSOLUTE COUNT: 0.53 K/CU MM
METAMYELOCYTES PERCENT: 3 %
MONOCYTES ABSOLUTE: 0.5 K/CU MM
MONOCYTES RELATIVE PERCENT: 3 % (ref 0–4)
PDW BLD-RTO: 17.3 % (ref 11.7–14.9)
PLATELET # BLD: 356 K/CU MM (ref 140–440)
PMV BLD AUTO: 9.8 FL (ref 7.5–11.1)
POLYCHROMASIA: ABNORMAL
POTASSIUM SERPL-SCNC: 4.1 MMOL/L (ref 3.5–5.1)
RBC # BLD: 3.13 M/CU MM (ref 4.6–6.2)
SEGMENTED NEUTROPHILS ABSOLUTE COUNT: 12.6 K/CU MM
SEGMENTED NEUTROPHILS RELATIVE PERCENT: 71 % (ref 36–66)
SODIUM BLD-SCNC: 137 MMOL/L (ref 135–145)
TOTAL PROTEIN: 5.7 GM/DL (ref 6.4–8.2)
VANCOMYCIN RANDOM: 9.3 UG/ML
WBC # BLD: 17.8 K/CU MM (ref 4–10.5)

## 2023-12-29 PROCEDURE — 2580000003 HC RX 258: Performed by: SURGERY

## 2023-12-29 PROCEDURE — 6360000002 HC RX W HCPCS: Performed by: STUDENT IN AN ORGANIZED HEALTH CARE EDUCATION/TRAINING PROGRAM

## 2023-12-29 PROCEDURE — 7100000000 HC PACU RECOVERY - FIRST 15 MIN: Performed by: SURGERY

## 2023-12-29 PROCEDURE — 2140000000 HC CCU INTERMEDIATE R&B

## 2023-12-29 PROCEDURE — 87075 CULTR BACTERIA EXCEPT BLOOD: CPT

## 2023-12-29 PROCEDURE — 3700000000 HC ANESTHESIA ATTENDED CARE: Performed by: SURGERY

## 2023-12-29 PROCEDURE — 87205 SMEAR GRAM STAIN: CPT

## 2023-12-29 PROCEDURE — 0KBS0ZZ EXCISION OF RIGHT LOWER LEG MUSCLE, OPEN APPROACH: ICD-10-PCS | Performed by: SURGERY

## 2023-12-29 PROCEDURE — 6370000000 HC RX 637 (ALT 250 FOR IP): Performed by: SURGERY

## 2023-12-29 PROCEDURE — 2500000003 HC RX 250 WO HCPCS: Performed by: NURSE ANESTHETIST, CERTIFIED REGISTERED

## 2023-12-29 PROCEDURE — 6360000002 HC RX W HCPCS: Performed by: SURGERY

## 2023-12-29 PROCEDURE — 85027 COMPLETE CBC AUTOMATED: CPT

## 2023-12-29 PROCEDURE — 80053 COMPREHEN METABOLIC PANEL: CPT

## 2023-12-29 PROCEDURE — 80202 ASSAY OF VANCOMYCIN: CPT

## 2023-12-29 PROCEDURE — 99232 SBSQ HOSP IP/OBS MODERATE 35: CPT | Performed by: SURGERY

## 2023-12-29 PROCEDURE — 6360000002 HC RX W HCPCS: Performed by: NURSE ANESTHETIST, CERTIFIED REGISTERED

## 2023-12-29 PROCEDURE — 85018 HEMOGLOBIN: CPT

## 2023-12-29 PROCEDURE — 36415 COLL VENOUS BLD VENIPUNCTURE: CPT

## 2023-12-29 PROCEDURE — 85007 BL SMEAR W/DIFF WBC COUNT: CPT

## 2023-12-29 PROCEDURE — 3600000002 HC SURGERY LEVEL 2 BASE: Performed by: SURGERY

## 2023-12-29 PROCEDURE — 2700000000 HC OXYGEN THERAPY PER DAY

## 2023-12-29 PROCEDURE — 85014 HEMATOCRIT: CPT

## 2023-12-29 PROCEDURE — 85520 HEPARIN ASSAY: CPT

## 2023-12-29 PROCEDURE — 3700000001 HC ADD 15 MINUTES (ANESTHESIA): Performed by: SURGERY

## 2023-12-29 PROCEDURE — 6360000002 HC RX W HCPCS: Performed by: ANESTHESIOLOGY

## 2023-12-29 PROCEDURE — 3600000012 HC SURGERY LEVEL 2 ADDTL 15MIN: Performed by: SURGERY

## 2023-12-29 PROCEDURE — 7100000001 HC PACU RECOVERY - ADDTL 15 MIN: Performed by: SURGERY

## 2023-12-29 PROCEDURE — 99211 OFF/OP EST MAY X REQ PHY/QHP: CPT

## 2023-12-29 PROCEDURE — 94761 N-INVAS EAR/PLS OXIMETRY MLT: CPT

## 2023-12-29 PROCEDURE — 87070 CULTURE OTHR SPECIMN AEROBIC: CPT

## 2023-12-29 PROCEDURE — 11046 DBRDMT MUSC&/FSCA EA ADDL: CPT | Performed by: SURGERY

## 2023-12-29 PROCEDURE — 11043 DBRDMT MUSC&/FSCA 1ST 20/<: CPT | Performed by: SURGERY

## 2023-12-29 PROCEDURE — 87077 CULTURE AEROBIC IDENTIFY: CPT

## 2023-12-29 PROCEDURE — 2500000003 HC RX 250 WO HCPCS: Performed by: SURGERY

## 2023-12-29 PROCEDURE — 2709999900 HC NON-CHARGEABLE SUPPLY: Performed by: SURGERY

## 2023-12-29 RX ORDER — SODIUM CHLORIDE, SODIUM LACTATE, POTASSIUM CHLORIDE, CALCIUM CHLORIDE 600; 310; 30; 20 MG/100ML; MG/100ML; MG/100ML; MG/100ML
INJECTION, SOLUTION INTRAVENOUS CONTINUOUS
Status: DISCONTINUED | OUTPATIENT
Start: 2023-12-29 | End: 2023-12-29

## 2023-12-29 RX ORDER — LIDOCAINE HYDROCHLORIDE 20 MG/ML
INJECTION, SOLUTION EPIDURAL; INFILTRATION; INTRACAUDAL; PERINEURAL PRN
Status: DISCONTINUED | OUTPATIENT
Start: 2023-12-29 | End: 2023-12-29 | Stop reason: SDUPTHER

## 2023-12-29 RX ORDER — SODIUM CHLORIDE 0.9 % (FLUSH) 0.9 %
5-40 SYRINGE (ML) INJECTION EVERY 12 HOURS SCHEDULED
Status: DISCONTINUED | OUTPATIENT
Start: 2023-12-29 | End: 2023-12-29 | Stop reason: HOSPADM

## 2023-12-29 RX ORDER — DROPERIDOL 2.5 MG/ML
0.62 INJECTION, SOLUTION INTRAMUSCULAR; INTRAVENOUS EVERY 10 MIN PRN
Status: DISCONTINUED | OUTPATIENT
Start: 2023-12-29 | End: 2023-12-29 | Stop reason: HOSPADM

## 2023-12-29 RX ORDER — OXYCODONE HYDROCHLORIDE 5 MG/1
5 TABLET ORAL
Status: DISCONTINUED | OUTPATIENT
Start: 2023-12-29 | End: 2023-12-29 | Stop reason: HOSPADM

## 2023-12-29 RX ORDER — CEFAZOLIN SODIUM 1 G/3ML
INJECTION, POWDER, FOR SOLUTION INTRAMUSCULAR; INTRAVENOUS PRN
Status: DISCONTINUED | OUTPATIENT
Start: 2023-12-29 | End: 2023-12-29 | Stop reason: SDUPTHER

## 2023-12-29 RX ORDER — SODIUM CHLORIDE, SODIUM LACTATE, POTASSIUM CHLORIDE, CALCIUM CHLORIDE 600; 310; 30; 20 MG/100ML; MG/100ML; MG/100ML; MG/100ML
INJECTION, SOLUTION INTRAVENOUS CONTINUOUS PRN
Status: DISCONTINUED | OUTPATIENT
Start: 2023-12-29 | End: 2023-12-29 | Stop reason: SDUPTHER

## 2023-12-29 RX ORDER — ONDANSETRON 2 MG/ML
4 INJECTION INTRAMUSCULAR; INTRAVENOUS
Status: DISCONTINUED | OUTPATIENT
Start: 2023-12-29 | End: 2023-12-29 | Stop reason: HOSPADM

## 2023-12-29 RX ORDER — FENTANYL CITRATE 50 UG/ML
25 INJECTION, SOLUTION INTRAMUSCULAR; INTRAVENOUS EVERY 5 MIN PRN
Status: DISCONTINUED | OUTPATIENT
Start: 2023-12-29 | End: 2023-12-29 | Stop reason: HOSPADM

## 2023-12-29 RX ORDER — LABETALOL HYDROCHLORIDE 5 MG/ML
10 INJECTION, SOLUTION INTRAVENOUS
Status: DISCONTINUED | OUTPATIENT
Start: 2023-12-29 | End: 2023-12-29 | Stop reason: HOSPADM

## 2023-12-29 RX ORDER — DIPHENHYDRAMINE HYDROCHLORIDE 50 MG/ML
12.5 INJECTION INTRAMUSCULAR; INTRAVENOUS
Status: DISCONTINUED | OUTPATIENT
Start: 2023-12-29 | End: 2023-12-29 | Stop reason: HOSPADM

## 2023-12-29 RX ORDER — HYDROCODONE BITARTRATE AND ACETAMINOPHEN 5; 325 MG/1; MG/1
1 TABLET ORAL EVERY 6 HOURS PRN
Status: DISCONTINUED | OUTPATIENT
Start: 2023-12-29 | End: 2024-01-10 | Stop reason: HOSPADM

## 2023-12-29 RX ORDER — SODIUM CHLORIDE 0.9 % (FLUSH) 0.9 %
5-40 SYRINGE (ML) INJECTION PRN
Status: DISCONTINUED | OUTPATIENT
Start: 2023-12-29 | End: 2023-12-29 | Stop reason: HOSPADM

## 2023-12-29 RX ORDER — HYDRALAZINE HYDROCHLORIDE 20 MG/ML
10 INJECTION INTRAMUSCULAR; INTRAVENOUS
Status: DISCONTINUED | OUTPATIENT
Start: 2023-12-29 | End: 2023-12-29 | Stop reason: HOSPADM

## 2023-12-29 RX ORDER — PROPOFOL 10 MG/ML
INJECTION, EMULSION INTRAVENOUS PRN
Status: DISCONTINUED | OUTPATIENT
Start: 2023-12-29 | End: 2023-12-29 | Stop reason: SDUPTHER

## 2023-12-29 RX ADMIN — SODIUM CHLORIDE, PRESERVATIVE FREE 10 ML: 5 INJECTION INTRAVENOUS at 21:17

## 2023-12-29 RX ADMIN — LIDOCAINE HYDROCHLORIDE 100 MG: 20 INJECTION, SOLUTION EPIDURAL; INFILTRATION; INTRACAUDAL; PERINEURAL at 05:35

## 2023-12-29 RX ADMIN — PHENYLEPHRINE HYDROCHLORIDE 50 MCG: 10 INJECTION INTRAVENOUS at 05:57

## 2023-12-29 RX ADMIN — VANCOMYCIN HYDROCHLORIDE 1250 MG: 1.25 INJECTION, POWDER, LYOPHILIZED, FOR SOLUTION INTRAVENOUS at 12:55

## 2023-12-29 RX ADMIN — HYDROCODONE BITARTRATE AND ACETAMINOPHEN 1 TABLET: 5; 325 TABLET ORAL at 18:59

## 2023-12-29 RX ADMIN — EZETIMIBE 10 MG: 10 TABLET ORAL at 21:17

## 2023-12-29 RX ADMIN — SODIUM CHLORIDE, PRESERVATIVE FREE 10 ML: 5 INJECTION INTRAVENOUS at 09:15

## 2023-12-29 RX ADMIN — CEFAZOLIN 2000 MG: 2 INJECTION, POWDER, FOR SOLUTION INTRAMUSCULAR; INTRAVENOUS at 17:42

## 2023-12-29 RX ADMIN — CLINDAMYCIN IN 5 PERCENT DEXTROSE 900 MG: 18 INJECTION, SOLUTION INTRAVENOUS at 05:00

## 2023-12-29 RX ADMIN — PROPOFOL 250 MG: 10 INJECTION, EMULSION INTRAVENOUS at 05:35

## 2023-12-29 RX ADMIN — HYDROMORPHONE HYDROCHLORIDE 0.5 MG: 1 INJECTION, SOLUTION INTRAMUSCULAR; INTRAVENOUS; SUBCUTANEOUS at 06:22

## 2023-12-29 RX ADMIN — SODIUM CHLORIDE, SODIUM LACTATE, POTASSIUM CHLORIDE, CALCIUM CHLORIDE: 600; 310; 30; 20 INJECTION, SOLUTION INTRAVENOUS at 05:30

## 2023-12-29 RX ADMIN — ROPINIROLE HYDROCHLORIDE 0.75 MG: 0.25 TABLET, FILM COATED ORAL at 21:17

## 2023-12-29 RX ADMIN — CEFEPIME 1000 MG: 1 INJECTION, POWDER, FOR SOLUTION INTRAMUSCULAR; INTRAVENOUS at 21:21

## 2023-12-29 RX ADMIN — HEPARIN SODIUM 18 UNITS/KG/HR: 10000 INJECTION, SOLUTION INTRAVENOUS at 13:18

## 2023-12-29 RX ADMIN — TRAZODONE HYDROCHLORIDE 50 MG: 50 TABLET ORAL at 21:17

## 2023-12-29 RX ADMIN — CEFAZOLIN 2 G: 1 INJECTION, POWDER, FOR SOLUTION INTRAMUSCULAR; INTRAVENOUS; PARENTERAL at 05:38

## 2023-12-29 RX ADMIN — SODIUM CHLORIDE, POTASSIUM CHLORIDE, SODIUM LACTATE AND CALCIUM CHLORIDE: 600; 310; 30; 20 INJECTION, SOLUTION INTRAVENOUS at 09:14

## 2023-12-29 ASSESSMENT — PAIN SCALES - GENERAL
PAINLEVEL_OUTOF10: 8
PAINLEVEL_OUTOF10: 2
PAINLEVEL_OUTOF10: 3
PAINLEVEL_OUTOF10: 7

## 2023-12-29 ASSESSMENT — PAIN DESCRIPTION - PAIN TYPE: TYPE: SURGICAL PAIN

## 2023-12-29 ASSESSMENT — PAIN - FUNCTIONAL ASSESSMENT
PAIN_FUNCTIONAL_ASSESSMENT: PREVENTS OR INTERFERES SOME ACTIVE ACTIVITIES AND ADLS
PAIN_FUNCTIONAL_ASSESSMENT: PREVENTS OR INTERFERES SOME ACTIVE ACTIVITIES AND ADLS
PAIN_FUNCTIONAL_ASSESSMENT: 0-10
PAIN_FUNCTIONAL_ASSESSMENT: PREVENTS OR INTERFERES SOME ACTIVE ACTIVITIES AND ADLS
PAIN_FUNCTIONAL_ASSESSMENT: PREVENTS OR INTERFERES SOME ACTIVE ACTIVITIES AND ADLS
PAIN_FUNCTIONAL_ASSESSMENT: 0-10

## 2023-12-29 ASSESSMENT — PAIN SCALES - WONG BAKER: WONGBAKER_NUMERICALRESPONSE: 0

## 2023-12-29 ASSESSMENT — PAIN DESCRIPTION - FREQUENCY: FREQUENCY: CONTINUOUS

## 2023-12-29 ASSESSMENT — PAIN DESCRIPTION - DESCRIPTORS
DESCRIPTORS: DISCOMFORT
DESCRIPTORS: ACHING
DESCRIPTORS: ACHING

## 2023-12-29 ASSESSMENT — PAIN DESCRIPTION - LOCATION
LOCATION: LEG
LOCATION: LEG

## 2023-12-29 ASSESSMENT — PAIN DESCRIPTION - ORIENTATION
ORIENTATION: RIGHT
ORIENTATION: LEFT

## 2023-12-29 ASSESSMENT — PAIN DESCRIPTION - ONSET: ONSET: ON-GOING

## 2023-12-29 NOTE — CARE COORDINATION
Chart reviewed.  Noted that pt was discharged to [facility] on 12/01 and lives in [place] at [facility].  PT/OT ordered & requested via wb. CM will meet with pt for d/c planning.  TE

## 2023-12-29 NOTE — OP NOTE
Operating Report    Patient ID:  Westley Rios  [id number]  89 y.o.  6/20/1926    Date of Procedure: 12/29/23    Surgeon: Aristides Pacheco MD, [facility], [facility]    Assistant: Prem Beltran    Anesthesia: MAC    IVF: Per anesthesia  mL IV crystalloid    EBL: Less than 50 mL    Specimen(s): 1. Culture of purulent fluid    2. Tissue for culture and specimen    Complications:  None apparent    Indication for Procedure: Worsening right lower extremity pain and increasing drainage    Pre-operative Diagnosis: Right lower extremity abscess    Post-operative Diagnosis: Right lower extremity soft tissue infection    Operation:  Excisional debridement of right lower extremity down to muscle  Total square cm area 300 square cm (20 x 15 cm = 300 sq cm)    Findings:   Consistent with post-operative diagnosis    Procedure Details:  The risk, benefits, expected outcome, and alternative to the recommended procedure have been discussed with the patient.  Patient understands and wants to proceed with the procedure.     Procedure:    The risks, benefits, alternatives were discussed with the patient. After agreeing to proceed, the patient was transported to the operating room.    The patient was transferred to the operating room table and positioned appropriately with carefull attention to all pressure points.     Safety straps were placed.     Anesthesia was induced without complication.     An approved timeout was held with all members of the team present and in agreement.     The patient had appropriate antibiotics administered in accordance with national protocols.     The above wound was cleansed with betadine and draped in a sterile fashion.     An Excisional Debridement down to muscle was accomplished.     Curette and bovie were use and pressure was applied for hemostasis.      The wound was packed with betadine moistened gauze and covered with gauze, ABD, kerlex, and ACE wrap.    At the end of the case, the patient was and transported to

## 2023-12-29 NOTE — PLAN OF CARE
Problem: Discharge Planning  Goal: Discharge to home or other facility with appropriate resources  Outcome: Progressing     Problem: Safety - Adult  Goal: Free from fall injury  Outcome: Progressing     Problem: ABCDS Injury Assessment  Goal: Absence of physical injury  Outcome: Progressing     Problem: Skin/Tissue Integrity  Goal: Absence of new skin breakdown  Description: 1.  Monitor for areas of redness and/or skin breakdown  2.  Assess vascular access sites hourly  3.  Every 4-6 hours minimum:  Change oxygen saturation probe site  4.  Every 4-6 hours:  If on nasal continuous positive airway pressure, respiratory therapy assess nares and determine need for appliance change or resting period.  Outcome: Progressing     Problem: Pain  Goal: Verbalizes/displays adequate comfort level or baseline comfort level  Outcome: Progressing     Problem: Nutrition Deficit:  Goal: Optimize nutritional status  Outcome: Progressing

## 2023-12-29 NOTE — PLAN OF CARE
Problem: Discharge Planning  Goal: Discharge to home or other facility with appropriate resources  Outcome: Progressing     Problem: Safety - Adult  Goal: Free from fall injury  Outcome: Progressing     Problem: ABCDS Injury Assessment  Goal: Absence of physical injury  Outcome: Progressing     Problem: Skin/Tissue Integrity  Goal: Absence of new skin breakdown  Description: 1.  Monitor for areas of redness and/or skin breakdown  2.  Assess vascular access sites hourly  3.  Every 4-6 hours minimum:  Change oxygen saturation probe site  4.  Every 4-6 hours:  If on nasal continuous positive airway pressure, respiratory therapy assess nares and determine need for appliance change or resting period.  Outcome: Progressing

## 2023-12-29 NOTE — CARE COORDINATION
.CM met with pt for d/c planning.  Introduced self and updated white board. Pt was discharged from [facility] on 12/25/23.  [place] [facility] and is independent with ADL's.  Pt drives, has a PCP, has insurance, and is able to afford his medication.  HHC & SNF offered and pt refused.  Pt denies any d/c needs at this time.   D/c plan is home with spouse, no needs.  Notify CM if any d/c needs arise.  TE     12/29/23 1202   Service Assessment   Patient Orientation Alert and Oriented   Cognition Alert   History Provided By Patient   Primary Caregiver Self   Support Systems Family Members;Spouse/Significant Other   Patient's Healthcare Decision Maker is:   (SELF)   PCP Verified by CM Yes   Last Visit to PCP Within last 3 months   Prior Functional Level Independent in ADLs/IADLs   Can patient return to prior living arrangement Unknown at present   Ability to make needs known: Good   Family able to assist with home care needs: Yes   Financial Resources Medicare   Social/Functional History   Lives With Spouse   Type of Home Condo  ([place] AT [facility])   Home Layout One level   Bathroom Shower/Tub Walk-in shower   Bathroom Toilet Handicap height   Bathroom Equipment Grab bars in shower;Shower chair   Bathroom Accessibility Accessible   Home Equipment Alert Button;Cane;Rollator   Receives Help From Family   Ambulation Assistance Independent   Transfer Assistance Independent   Active  Yes   Mode of Transportation Car   Discharge Planning   Type of Residence Independent Living   Living Arrangements Spouse/Significant Other   DME Ordered? No   Potential Assistance Purchasing Medications No   Type of Home Care Services None   Patient expects to be discharged to:   (INDEPENDENT LIVING AT [facility])   Services At/After Discharge   Transition of Care Consult (CM Consult) N/A   Services At/After Discharge None   Condition of Participation: Discharge Planning   The Patient and/or Patient Representative was

## 2023-12-30 PROBLEM — L03.119 CELLULITIS AND ABSCESS OF LEG: Status: ACTIVE | Noted: 2023-12-29

## 2023-12-30 PROBLEM — L02.419 CELLULITIS AND ABSCESS OF LEG: Status: ACTIVE | Noted: 2023-12-29

## 2023-12-30 LAB
ALBUMIN SERPL-MCNC: 2.6 GM/DL (ref 3.4–5)
ALP BLD-CCNC: 97 IU/L (ref 40–128)
ALT SERPL-CCNC: 6 U/L (ref 10–40)
ANION GAP SERPL CALCULATED.3IONS-SCNC: 17 MMOL/L (ref 7–16)
ANISOCYTOSIS: ABNORMAL
ANTI-XA UNFRAC HEPARIN: 0.59 IU/ML (ref 0.3–0.7)
ANTI-XA UNFRAC HEPARIN: 0.78 IU/ML (ref 0.3–0.7)
AST SERPL-CCNC: 18 IU/L (ref 15–37)
BANDED NEUTROPHILS ABSOLUTE COUNT: 1.39 K/CU MM
BANDED NEUTROPHILS RELATIVE PERCENT: 6 % (ref 5–11)
BILIRUB SERPL-MCNC: 0.5 MG/DL (ref 0–1)
BUN SERPL-MCNC: 25 MG/DL (ref 6–23)
CALCIUM SERPL-MCNC: 7.9 MG/DL (ref 8.3–10.6)
CHLORIDE BLD-SCNC: 100 MMOL/L (ref 99–110)
CO2: 20 MMOL/L (ref 21–32)
CREAT SERPL-MCNC: 1.7 MG/DL (ref 0.9–1.3)
DIFFERENTIAL TYPE: ABNORMAL
GFR SERPL CREATININE-BSD FRML MDRD: 36 ML/MIN/1.73M2
GLUCOSE SERPL-MCNC: 146 MG/DL (ref 70–99)
HCT VFR BLD CALC: 20.1 % (ref 42–52)
HCT VFR BLD CALC: 24.6 % (ref 42–52)
HEMOGLOBIN: 6 GM/DL (ref 13.5–18)
HEMOGLOBIN: 7.3 GM/DL (ref 13.5–18)
HYPOCHROMIA: ABNORMAL
LYMPHOCYTES ABSOLUTE: 2.5 K/CU MM
LYMPHOCYTES RELATIVE PERCENT: 11 % (ref 24–44)
MCH RBC QN AUTO: 28.6 PG (ref 27–31)
MCHC RBC AUTO-ENTMCNC: 29.9 % (ref 32–36)
MCV RBC AUTO: 95.7 FL (ref 78–100)
METAMYELOCYTES ABSOLUTE COUNT: 0.69 K/CU MM
METAMYELOCYTES PERCENT: 3 %
MONOCYTES ABSOLUTE: 0.7 K/CU MM
MONOCYTES RELATIVE PERCENT: 3 % (ref 0–4)
MYELOCYTE PERCENT: 2 %
MYELOCYTES ABSOLUTE COUNT: 0.46 K/CU MM
PDW BLD-RTO: 17.4 % (ref 11.7–14.9)
PLATELET # BLD: 387 K/CU MM (ref 140–440)
PLT MORPHOLOGY: ABNORMAL
PMV BLD AUTO: 10.2 FL (ref 7.5–11.1)
POTASSIUM SERPL-SCNC: 4.2 MMOL/L (ref 3.5–5.1)
RBC # BLD: 2.1 M/CU MM (ref 4.6–6.2)
SEGMENTED NEUTROPHILS ABSOLUTE COUNT: 17.4 K/CU MM
SEGMENTED NEUTROPHILS RELATIVE PERCENT: 75 % (ref 36–66)
SODIUM BLD-SCNC: 137 MMOL/L (ref 135–145)
TOTAL PROTEIN: 5.2 GM/DL (ref 6.4–8.2)
WBC # BLD: 23.1 K/CU MM (ref 4–10.5)

## 2023-12-30 PROCEDURE — 86850 RBC ANTIBODY SCREEN: CPT

## 2023-12-30 PROCEDURE — 86901 BLOOD TYPING SEROLOGIC RH(D): CPT

## 2023-12-30 PROCEDURE — 86900 BLOOD TYPING SEROLOGIC ABO: CPT

## 2023-12-30 PROCEDURE — 36415 COLL VENOUS BLD VENIPUNCTURE: CPT

## 2023-12-30 PROCEDURE — 85007 BL SMEAR W/DIFF WBC COUNT: CPT

## 2023-12-30 PROCEDURE — 36430 TRANSFUSION BLD/BLD COMPNT: CPT

## 2023-12-30 PROCEDURE — 11046 DBRDMT MUSC&/FSCA EA ADDL: CPT | Performed by: SURGERY

## 2023-12-30 PROCEDURE — 85027 COMPLETE CBC AUTOMATED: CPT

## 2023-12-30 PROCEDURE — 6370000000 HC RX 637 (ALT 250 FOR IP): Performed by: SURGERY

## 2023-12-30 PROCEDURE — 80053 COMPREHEN METABOLIC PANEL: CPT

## 2023-12-30 PROCEDURE — 85018 HEMOGLOBIN: CPT

## 2023-12-30 PROCEDURE — 99232 SBSQ HOSP IP/OBS MODERATE 35: CPT | Performed by: SURGERY

## 2023-12-30 PROCEDURE — 85014 HEMATOCRIT: CPT

## 2023-12-30 PROCEDURE — 2580000003 HC RX 258: Performed by: SURGERY

## 2023-12-30 PROCEDURE — P9016 RBC LEUKOCYTES REDUCED: HCPCS

## 2023-12-30 PROCEDURE — 94761 N-INVAS EAR/PLS OXIMETRY MLT: CPT

## 2023-12-30 PROCEDURE — 85520 HEPARIN ASSAY: CPT

## 2023-12-30 PROCEDURE — 80202 ASSAY OF VANCOMYCIN: CPT

## 2023-12-30 PROCEDURE — 2140000000 HC CCU INTERMEDIATE R&B

## 2023-12-30 PROCEDURE — 2500000003 HC RX 250 WO HCPCS: Performed by: SURGERY

## 2023-12-30 PROCEDURE — 86922 COMPATIBILITY TEST ANTIGLOB: CPT

## 2023-12-30 PROCEDURE — 6360000002 HC RX W HCPCS: Performed by: SURGERY

## 2023-12-30 RX ORDER — MORPHINE SULFATE 2 MG/ML
2 INJECTION, SOLUTION INTRAMUSCULAR; INTRAVENOUS
Status: DISCONTINUED | OUTPATIENT
Start: 2023-12-30 | End: 2024-01-10 | Stop reason: HOSPADM

## 2023-12-30 RX ORDER — SODIUM CHLORIDE 9 MG/ML
500 INJECTION, SOLUTION INTRAVENOUS PRN
Status: DISCONTINUED | OUTPATIENT
Start: 2023-12-30 | End: 2024-01-09

## 2023-12-30 RX ADMIN — ROPINIROLE HYDROCHLORIDE 0.75 MG: 0.25 TABLET, FILM COATED ORAL at 20:26

## 2023-12-30 RX ADMIN — SODIUM CHLORIDE, PRESERVATIVE FREE 10 ML: 5 INJECTION INTRAVENOUS at 08:47

## 2023-12-30 RX ADMIN — HEPARIN SODIUM 1470 UNITS/HR: 10000 INJECTION, SOLUTION INTRAVENOUS at 07:21

## 2023-12-30 RX ADMIN — HYDROCODONE BITARTRATE AND ACETAMINOPHEN 1 TABLET: 5; 325 TABLET ORAL at 13:17

## 2023-12-30 RX ADMIN — SODIUM CHLORIDE, PRESERVATIVE FREE 10 ML: 5 INJECTION INTRAVENOUS at 20:26

## 2023-12-30 RX ADMIN — EZETIMIBE 10 MG: 10 TABLET ORAL at 20:26

## 2023-12-30 RX ADMIN — TRAZODONE HYDROCHLORIDE 50 MG: 50 TABLET ORAL at 20:26

## 2023-12-30 RX ADMIN — HYDROMORPHONE HYDROCHLORIDE 0.5 MG: 1 INJECTION, SOLUTION INTRAMUSCULAR; INTRAVENOUS; SUBCUTANEOUS at 09:54

## 2023-12-30 RX ADMIN — SODIUM CHLORIDE, POTASSIUM CHLORIDE, SODIUM LACTATE AND CALCIUM CHLORIDE: 600; 310; 30; 20 INJECTION, SOLUTION INTRAVENOUS at 04:34

## 2023-12-30 ASSESSMENT — PAIN SCALES - GENERAL
PAINLEVEL_OUTOF10: 0
PAINLEVEL_OUTOF10: 0
PAINLEVEL_OUTOF10: 8
PAINLEVEL_OUTOF10: 0
PAINLEVEL_OUTOF10: 9

## 2023-12-30 ASSESSMENT — PAIN DESCRIPTION - DESCRIPTORS
DESCRIPTORS: ACHING
DESCRIPTORS: ACHING

## 2023-12-30 ASSESSMENT — PAIN DESCRIPTION - ORIENTATION: ORIENTATION: RIGHT

## 2023-12-30 ASSESSMENT — PAIN SCALES - WONG BAKER: WONGBAKER_NUMERICALRESPONSE: 0

## 2023-12-30 ASSESSMENT — PAIN DESCRIPTION - LOCATION
LOCATION: BACK
LOCATION: LEG

## 2023-12-30 NOTE — ANESTHESIA POSTPROCEDURE EVALUATION
Department of Anesthesiology  Postprocedure Note    Patient: Miguel Cuevas  MRN: [id number]  YOB: 1926  Date of evaluation: 12/30/2023    Procedure Summary       Date: 12/29/23 Room / Location: [facility] OR [address] [place] [address]    Anesthesia Start: [address] Anesthesia Stop: 6912    Procedure: LEG DEBRIDEMENT INCISION AND DRAINAGE (Right: Leg Lower) Diagnosis:       Abscess      (Abscess [L02.91])    Surgeons: Jeff Gutierrez MD Responsible Provider: Joey Ferrell MD    Anesthesia Type: MAC ASA Status: 3            Anesthesia Type: No value filed. Cristina Phase I: Cristina Score: 8    Cristina Phase II:      Anesthesia Post Evaluation    Patient location during evaluation: PACU  Patient participation: complete - patient participated  Level of consciousness: awake and alert  Pain score: 2  Airway patency: patent  Nausea & Vomiting: no nausea and no vomiting  Cardiovascular status: hemodynamically stable  Respiratory status: acceptable  Hydration status: euvolemic  Multimodal analgesia pain management approach  Pain management: adequate        No notable events documented.

## 2023-12-30 NOTE — CONSENT
Informed Consent for Blood Component Transfusion Note    I have discussed with the patient the rationale for blood component transfusion; its benefits in treating or preventing fatigue, organ damage, or death; and its risk which includes mild transfusion reactions, rare risk of blood borne infection, or more serious but rare reactions. I have discussed the alternatives to transfusion, including the risk and consequences of not receiving transfusion. The patient had an opportunity to ask questions and had agreed to proceed with transfusion of blood components.    Electronically signed by Carlos Snow MD on 12/30/23 at 2:35 PM EST

## 2023-12-31 ENCOUNTER — ANESTHESIA EVENT (OUTPATIENT)
Dept: OPERATING ROOM | Age: 88
End: 2023-12-31
Payer: MEDICARE

## 2023-12-31 LAB
ALBUMIN SERPL-MCNC: 2.1 GM/DL (ref 3.4–5)
ALP BLD-CCNC: 74 IU/L (ref 40–128)
ALT SERPL-CCNC: <5 U/L (ref 10–40)
ANION GAP SERPL CALCULATED.3IONS-SCNC: 8 MMOL/L (ref 7–16)
ANISOCYTOSIS: ABNORMAL
ANTI-XA UNFRAC HEPARIN: 0.7 IU/ML (ref 0.3–0.7)
ANTI-XA UNFRAC HEPARIN: 0.73 IU/ML (ref 0.3–0.7)
AST SERPL-CCNC: 13 IU/L (ref 15–37)
BANDED NEUTROPHILS ABSOLUTE COUNT: 1.32 K/CU MM
BANDED NEUTROPHILS RELATIVE PERCENT: 8 % (ref 5–11)
BILIRUB SERPL-MCNC: 0.6 MG/DL (ref 0–1)
BUN SERPL-MCNC: 20 MG/DL (ref 6–23)
BURR CELLS: ABNORMAL
CALCIUM SERPL-MCNC: 7.3 MG/DL (ref 8.3–10.6)
CHLORIDE BLD-SCNC: 102 MMOL/L (ref 99–110)
CO2: 24 MMOL/L (ref 21–32)
CREAT SERPL-MCNC: 1.4 MG/DL (ref 0.9–1.3)
DIFFERENTIAL TYPE: ABNORMAL
DOSE AMOUNT: NORMAL
DOSE TIME: NORMAL
EOSINOPHILS ABSOLUTE: 0.2 K/CU MM
EOSINOPHILS RELATIVE PERCENT: 1 % (ref 0–3)
GFR SERPL CREATININE-BSD FRML MDRD: 46 ML/MIN/1.73M2
GLUCOSE SERPL-MCNC: 97 MG/DL (ref 70–99)
HCT VFR BLD CALC: 16.3 % (ref 42–52)
HCT VFR BLD CALC: 25.9 % (ref 42–52)
HEMOGLOBIN: 5 GM/DL (ref 13.5–18)
HEMOGLOBIN: 8.4 GM/DL (ref 13.5–18)
HYPOCHROMIA: ABNORMAL
LYMPHOCYTES ABSOLUTE: 0.7 K/CU MM
LYMPHOCYTES RELATIVE PERCENT: 4 % (ref 24–44)
MCH RBC QN AUTO: 28.1 PG (ref 27–31)
MCHC RBC AUTO-ENTMCNC: 30.7 % (ref 32–36)
MCV RBC AUTO: 91.6 FL (ref 78–100)
METAMYELOCYTES ABSOLUTE COUNT: 0.99 K/CU MM
METAMYELOCYTES PERCENT: 6 %
MONOCYTES ABSOLUTE: 0.3 K/CU MM
MONOCYTES RELATIVE PERCENT: 2 % (ref 0–4)
MYELOCYTE PERCENT: 2 %
MYELOCYTES ABSOLUTE COUNT: 0.33 K/CU MM
OVALOCYTES: ABNORMAL
PDW BLD-RTO: 18.4 % (ref 11.7–14.9)
PLATELET # BLD: 250 K/CU MM (ref 140–440)
PMV BLD AUTO: 9.8 FL (ref 7.5–11.1)
POLYCHROMASIA: ABNORMAL
POTASSIUM SERPL-SCNC: 3.9 MMOL/L (ref 3.5–5.1)
RBC # BLD: 1.78 M/CU MM (ref 4.6–6.2)
REASON FOR REJECTION: NORMAL
REJECTED TEST: NORMAL
SEGMENTED NEUTROPHILS ABSOLUTE COUNT: 12.7 K/CU MM
SEGMENTED NEUTROPHILS RELATIVE PERCENT: 77 % (ref 36–66)
SODIUM BLD-SCNC: 134 MMOL/L (ref 135–145)
TOTAL PROTEIN: 4.5 GM/DL (ref 6.4–8.2)
VANCOMYCIN RANDOM: 13.4 UG/ML
WBC # BLD: 16.5 K/CU MM (ref 4–10.5)

## 2023-12-31 PROCEDURE — 2580000003 HC RX 258: Performed by: SURGERY

## 2023-12-31 PROCEDURE — 80053 COMPREHEN METABOLIC PANEL: CPT

## 2023-12-31 PROCEDURE — 85027 COMPLETE CBC AUTOMATED: CPT

## 2023-12-31 PROCEDURE — 6370000000 HC RX 637 (ALT 250 FOR IP): Performed by: SURGERY

## 2023-12-31 PROCEDURE — 85014 HEMATOCRIT: CPT

## 2023-12-31 PROCEDURE — 85520 HEPARIN ASSAY: CPT

## 2023-12-31 PROCEDURE — 6360000002 HC RX W HCPCS: Performed by: STUDENT IN AN ORGANIZED HEALTH CARE EDUCATION/TRAINING PROGRAM

## 2023-12-31 PROCEDURE — 2580000003 HC RX 258: Performed by: STUDENT IN AN ORGANIZED HEALTH CARE EDUCATION/TRAINING PROGRAM

## 2023-12-31 PROCEDURE — 2140000000 HC CCU INTERMEDIATE R&B

## 2023-12-31 PROCEDURE — 2500000003 HC RX 250 WO HCPCS: Performed by: SURGERY

## 2023-12-31 PROCEDURE — 80202 ASSAY OF VANCOMYCIN: CPT

## 2023-12-31 PROCEDURE — 94761 N-INVAS EAR/PLS OXIMETRY MLT: CPT

## 2023-12-31 PROCEDURE — 6360000002 HC RX W HCPCS: Performed by: SURGERY

## 2023-12-31 PROCEDURE — P9016 RBC LEUKOCYTES REDUCED: HCPCS

## 2023-12-31 PROCEDURE — 85007 BL SMEAR W/DIFF WBC COUNT: CPT

## 2023-12-31 PROCEDURE — 85018 HEMOGLOBIN: CPT

## 2023-12-31 PROCEDURE — 36430 TRANSFUSION BLD/BLD COMPNT: CPT

## 2023-12-31 PROCEDURE — 36415 COLL VENOUS BLD VENIPUNCTURE: CPT

## 2023-12-31 PROCEDURE — 99024 POSTOP FOLLOW-UP VISIT: CPT | Performed by: SURGERY

## 2023-12-31 RX ORDER — SODIUM CHLORIDE, SODIUM LACTATE, POTASSIUM CHLORIDE, CALCIUM CHLORIDE 600; 310; 30; 20 MG/100ML; MG/100ML; MG/100ML; MG/100ML
1000 INJECTION, SOLUTION INTRAVENOUS CONTINUOUS
Status: DISCONTINUED | OUTPATIENT
Start: 2024-01-01 | End: 2024-01-03

## 2023-12-31 RX ORDER — SODIUM CHLORIDE 9 MG/ML
INJECTION, SOLUTION INTRAVENOUS PRN
Status: DISCONTINUED | OUTPATIENT
Start: 2023-12-31 | End: 2024-01-09

## 2023-12-31 RX ADMIN — ROPINIROLE HYDROCHLORIDE 0.75 MG: 0.25 TABLET, FILM COATED ORAL at 21:22

## 2023-12-31 RX ADMIN — HYDROMORPHONE HYDROCHLORIDE 0.5 MG: 1 INJECTION, SOLUTION INTRAMUSCULAR; INTRAVENOUS; SUBCUTANEOUS at 09:00

## 2023-12-31 RX ADMIN — EZETIMIBE 10 MG: 10 TABLET ORAL at 21:22

## 2023-12-31 RX ADMIN — SODIUM CHLORIDE, PRESERVATIVE FREE 10 ML: 5 INJECTION INTRAVENOUS at 08:19

## 2023-12-31 RX ADMIN — HEPARIN SODIUM 17 UNITS/KG/HR: 10000 INJECTION, SOLUTION INTRAVENOUS at 04:27

## 2023-12-31 RX ADMIN — VANCOMYCIN HYDROCHLORIDE 1250 MG: 1.25 INJECTION, POWDER, LYOPHILIZED, FOR SOLUTION INTRAVENOUS at 11:51

## 2023-12-31 RX ADMIN — TRAZODONE HYDROCHLORIDE 50 MG: 50 TABLET ORAL at 21:22

## 2023-12-31 ASSESSMENT — PAIN DESCRIPTION - DESCRIPTORS: DESCRIPTORS: ACHING

## 2023-12-31 ASSESSMENT — PAIN DESCRIPTION - ORIENTATION: ORIENTATION: RIGHT

## 2023-12-31 ASSESSMENT — PAIN SCALES - GENERAL
PAINLEVEL_OUTOF10: 10
PAINLEVEL_OUTOF10: 0

## 2023-12-31 ASSESSMENT — PAIN DESCRIPTION - LOCATION: LOCATION: LEG

## 2024-01-01 ENCOUNTER — ANESTHESIA (OUTPATIENT)
Dept: OPERATING ROOM | Age: 89
End: 2024-01-01
Payer: MEDICARE

## 2024-01-01 LAB
ANTI-XA UNFRAC HEPARIN: 0.58 IU/ML (ref 0.3–0.7)
CULTURE: ABNORMAL
DOSE AMOUNT: NORMAL
DOSE TIME: NORMAL
HCT VFR BLD CALC: 22.4 % (ref 42–52)
HEMOGLOBIN: 7.5 GM/DL (ref 13.5–18)
Lab: ABNORMAL
MCH RBC QN AUTO: 29.9 PG (ref 27–31)
MCHC RBC AUTO-ENTMCNC: 33.5 % (ref 32–36)
MCV RBC AUTO: 89.2 FL (ref 78–100)
PDW BLD-RTO: 16.3 % (ref 11.7–14.9)
PLATELET # BLD: 250 K/CU MM (ref 140–440)
PMV BLD AUTO: 10.1 FL (ref 7.5–11.1)
RBC # BLD: 2.51 M/CU MM (ref 4.6–6.2)
SPECIMEN: ABNORMAL
VANCOMYCIN RANDOM: 18.8 UG/ML
WBC # BLD: 21.3 K/CU MM (ref 4–10.5)

## 2024-01-01 PROCEDURE — 85027 COMPLETE CBC AUTOMATED: CPT

## 2024-01-01 PROCEDURE — 3600000012 HC SURGERY LEVEL 2 ADDTL 15MIN: Performed by: SURGERY

## 2024-01-01 PROCEDURE — 11046 DBRDMT MUSC&/FSCA EA ADDL: CPT | Performed by: SURGERY

## 2024-01-01 PROCEDURE — 3700000001 HC ADD 15 MINUTES (ANESTHESIA): Performed by: SURGERY

## 2024-01-01 PROCEDURE — 2500000003 HC RX 250 WO HCPCS: Performed by: SURGERY

## 2024-01-01 PROCEDURE — 11043 DBRDMT MUSC&/FSCA 1ST 20/<: CPT | Performed by: SURGERY

## 2024-01-01 PROCEDURE — 2140000000 HC CCU INTERMEDIATE R&B

## 2024-01-01 PROCEDURE — 80202 ASSAY OF VANCOMYCIN: CPT

## 2024-01-01 PROCEDURE — 2580000003 HC RX 258: Performed by: STUDENT IN AN ORGANIZED HEALTH CARE EDUCATION/TRAINING PROGRAM

## 2024-01-01 PROCEDURE — 3700000000 HC ANESTHESIA ATTENDED CARE: Performed by: SURGERY

## 2024-01-01 PROCEDURE — 2580000003 HC RX 258: Performed by: SURGERY

## 2024-01-01 PROCEDURE — 6360000002 HC RX W HCPCS: Performed by: SURGERY

## 2024-01-01 PROCEDURE — 6360000002 HC RX W HCPCS

## 2024-01-01 PROCEDURE — 2709999900 HC NON-CHARGEABLE SUPPLY: Performed by: SURGERY

## 2024-01-01 PROCEDURE — 6370000000 HC RX 637 (ALT 250 FOR IP): Performed by: SURGERY

## 2024-01-01 PROCEDURE — 0KBS0ZZ EXCISION OF RIGHT LOWER LEG MUSCLE, OPEN APPROACH: ICD-10-PCS | Performed by: SURGERY

## 2024-01-01 PROCEDURE — 94761 N-INVAS EAR/PLS OXIMETRY MLT: CPT

## 2024-01-01 PROCEDURE — 85520 HEPARIN ASSAY: CPT

## 2024-01-01 PROCEDURE — 3600000002 HC SURGERY LEVEL 2 BASE: Performed by: SURGERY

## 2024-01-01 PROCEDURE — 36415 COLL VENOUS BLD VENIPUNCTURE: CPT

## 2024-01-01 RX ORDER — SODIUM CHLORIDE, SODIUM LACTATE, POTASSIUM CHLORIDE, CALCIUM CHLORIDE 600; 310; 30; 20 MG/100ML; MG/100ML; MG/100ML; MG/100ML
INJECTION, SOLUTION INTRAVENOUS CONTINUOUS PRN
Status: DISCONTINUED | OUTPATIENT
Start: 2024-01-01 | End: 2024-01-01 | Stop reason: SDUPTHER

## 2024-01-01 RX ORDER — FENTANYL CITRATE 50 UG/ML
INJECTION, SOLUTION INTRAMUSCULAR; INTRAVENOUS PRN
Status: DISCONTINUED | OUTPATIENT
Start: 2024-01-01 | End: 2024-01-01 | Stop reason: SDUPTHER

## 2024-01-01 RX ORDER — PROPOFOL 10 MG/ML
INJECTION, EMULSION INTRAVENOUS CONTINUOUS PRN
Status: DISCONTINUED | OUTPATIENT
Start: 2024-01-01 | End: 2024-01-01 | Stop reason: SDUPTHER

## 2024-01-01 RX ORDER — PHENYLEPHRINE HCL IN 0.9% NACL 1 MG/10 ML
SYRINGE (ML) INTRAVENOUS PRN
Status: DISCONTINUED | OUTPATIENT
Start: 2024-01-01 | End: 2024-01-01 | Stop reason: SDUPTHER

## 2024-01-01 RX ADMIN — PROPOFOL 100 MCG/KG/MIN: 10 INJECTION, EMULSION INTRAVENOUS at 10:51

## 2024-01-01 RX ADMIN — SODIUM CHLORIDE, POTASSIUM CHLORIDE, SODIUM LACTATE AND CALCIUM CHLORIDE 1000 ML: 600; 310; 30; 20 INJECTION, SOLUTION INTRAVENOUS at 11:54

## 2024-01-01 RX ADMIN — TRAZODONE HYDROCHLORIDE 50 MG: 50 TABLET ORAL at 23:05

## 2024-01-01 RX ADMIN — SODIUM CHLORIDE, POTASSIUM CHLORIDE, SODIUM LACTATE AND CALCIUM CHLORIDE 1000 ML: 600; 310; 30; 20 INJECTION, SOLUTION INTRAVENOUS at 23:16

## 2024-01-01 RX ADMIN — SODIUM CHLORIDE, POTASSIUM CHLORIDE, SODIUM LACTATE AND CALCIUM CHLORIDE 1000 ML: 600; 310; 30; 20 INJECTION, SOLUTION INTRAVENOUS at 00:17

## 2024-01-01 RX ADMIN — Medication 0.1 MG: at 11:17

## 2024-01-01 RX ADMIN — PROPOFOL 50 MG: 10 INJECTION, EMULSION INTRAVENOUS at 10:50

## 2024-01-01 RX ADMIN — EZETIMIBE 10 MG: 10 TABLET ORAL at 23:05

## 2024-01-01 RX ADMIN — MORPHINE SULFATE 2 MG: 2 INJECTION, SOLUTION INTRAMUSCULAR; INTRAVENOUS at 15:06

## 2024-01-01 RX ADMIN — HYDROCODONE BITARTRATE AND ACETAMINOPHEN 1 TABLET: 5; 325 TABLET ORAL at 11:58

## 2024-01-01 RX ADMIN — SODIUM CHLORIDE, SODIUM LACTATE, POTASSIUM CHLORIDE, CALCIUM CHLORIDE: 600; 310; 30; 20 INJECTION, SOLUTION INTRAVENOUS at 10:45

## 2024-01-01 RX ADMIN — SODIUM CHLORIDE, POTASSIUM CHLORIDE, SODIUM LACTATE AND CALCIUM CHLORIDE 1000 ML: 600; 310; 30; 20 INJECTION, SOLUTION INTRAVENOUS at 04:22

## 2024-01-01 RX ADMIN — ROPINIROLE HYDROCHLORIDE 0.75 MG: 0.25 TABLET, FILM COATED ORAL at 23:05

## 2024-01-01 RX ADMIN — HEPARIN SODIUM 16 UNITS/KG/HR: 10000 INJECTION, SOLUTION INTRAVENOUS at 00:54

## 2024-01-01 RX ADMIN — FENTANYL CITRATE 100 MCG: 50 INJECTION, SOLUTION INTRAMUSCULAR; INTRAVENOUS at 11:38

## 2024-01-01 ASSESSMENT — PAIN SCALES - GENERAL
PAINLEVEL_OUTOF10: 0
PAINLEVEL_OUTOF10: 0
PAINLEVEL_OUTOF10: 8
PAINLEVEL_OUTOF10: 0
PAINLEVEL_OUTOF10: 0
PAINLEVEL_OUTOF10: 9

## 2024-01-01 ASSESSMENT — PAIN DESCRIPTION - FREQUENCY: FREQUENCY: CONTINUOUS

## 2024-01-01 ASSESSMENT — PAIN DESCRIPTION - LOCATION
LOCATION: LEG
LOCATION: LEG

## 2024-01-01 ASSESSMENT — PAIN DESCRIPTION - DESCRIPTORS
DESCRIPTORS: ACHING
DESCRIPTORS: ACHING

## 2024-01-01 ASSESSMENT — PAIN DESCRIPTION - ONSET: ONSET: ON-GOING

## 2024-01-01 ASSESSMENT — PAIN DESCRIPTION - PAIN TYPE: TYPE: SURGICAL PAIN

## 2024-01-01 ASSESSMENT — PAIN DESCRIPTION - ORIENTATION
ORIENTATION: RIGHT
ORIENTATION: RIGHT

## 2024-01-01 NOTE — OP NOTE
Operative Note      Patient: Westley Rios  YOB: 1926  MRN: [id number]    Date of Procedure: 1/1/2024    Pre-Op Diagnosis Codes:     * Abscess [L02.91]    Post-Op Diagnosis: Same       Procedure(s):  EXCISIONAL DEBRIDEMENT RIGHT LOWER EXTREMITY WOUND AND WOUND VAC PLACEMENT    Surgeon(s):  Cristian Bermudez DO    Assistant:   * No surgical staff found *    Anesthesia: General    Estimated Blood Loss (mL): Minimal    Complications: None    Specimens:   * No specimens in log *    Implants:  * No implants in log *      Drains:   Negative Pressure Wound Therapy Leg Lower;Posterior;Right (Active)   Wound Type Acute/Traumatic;Surgical 01/01/24 1125   Dressing Type Black Foam 01/01/24 1125   Number of pieces used 1 01/01/24 1125   Cycle Continuous 01/01/24 1125   Target Pressure (mmHg) 125 01/01/24 1125   Canister changed? Yes 01/01/24 1125   Dressing Status New dressing applied;Clean, dry & intact 01/01/24 1125   Dressing Changed Changed/New 01/01/24 1125   Drainage Amount Small 01/01/24 1125       External Urinary Catheter (Active)   Site Assessment Clean,dry & intact 01/01/24 0800   Placement Replaced 12/31/23 0545   Securement Method Securing device (Describe) 12/31/23 0545   Catheter Care Catheter/Wick replaced;Suction Canister/Tubing changed 12/31/23 0545   Perineal Care Yes 12/31/23 0545   Suction 40 mmgHg continuous 12/31/23 0545   Urine Color Yellow 12/31/23 0545   Urine Appearance Clear 12/31/23 0545   Urine Odor Malodorous 12/31/23 0545   Output (mL) 525 mL 12/30/23 0356       [REMOVED] External Urinary Catheter (Removed)   Site Assessment Clean,dry & intact 12/01/23 1650   Placement Replaced 11/29/23 0400   Securement Method Securing device (Describe) 11/29/23 0400   Catheter Care Catheter/Wick replaced;Suction Canister/Tubing changed 11/29/23 0400   Perineal Care Yes 11/29/23 0400   Suction 40 mmgHg continuous 11/29/23 0400   Urine Color Yellow 12/01/23 1650   Urine Appearance Clear 12/01/23

## 2024-01-01 NOTE — ANESTHESIA PRE PROCEDURE
2026   • rOPINIRole (REQUIP) tablet 0.75 mg  0.75 mg Oral Nightly Aristides Pacheco II, MD   0.75 mg at 12/30/23 2026   • [Held by provider] metoprolol succinate (TOPROL XL) extended release tablet 12.5 mg  12.5 mg Oral Daily Aakash Murphy MD       • sodium chloride flush 0.9 % injection 5-40 mL  5-40 mL IntraVENous 2 times per day Aristides Pacheco II, MD   10 mL at 12/31/23 0819   • sodium chloride flush 0.9 % injection 5-40 mL  5-40 mL IntraVENous PRN Aristides Pacheco II, MD       • 0.9 % sodium chloride infusion   IntraVENous PRN Aristides Pacheco II, MD 25 mL/hr at 12/28/23 1521 25 mL at 12/28/23 1521   • ondansetron (ZOFRAN-ODT) disintegrating tablet 4 mg  4 mg Oral Q8H PRN Aristides Pacheco II, MD        Or   • ondansetron (ZOFRAN) injection 4 mg  4 mg IntraVENous Q6H PRN Aristides Pacheco II, MD       • polyethylene glycol (GLYCOLAX) packet 17 g  17 g Oral Daily PRN Aristides Pacheco II, MD       • acetaminophen (TYLENOL) tablet 650 mg  650 mg Oral Q6H PRN Aristides Pacheco II, MD        Or   • acetaminophen (TYLENOL) suppository 650 mg  650 mg Rectal Q6H PRN Aristides Pacheco II, MD       • vancomycin (VANCOCIN) intermittent dosing (placeholder)   Other RX Placeholder Aristides Pacheco II, MD           Allergies:    Allergies   Allergen Reactions   • Penicillins    • Sulfa Antibiotics    • Adhesive Tape    • Diatrizoate      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   • Imdur [Isosorbide Nitrate] Rash   • Statins Myalgia     Leg cramping.        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

## 2024-01-01 NOTE — ANESTHESIA POSTPROCEDURE EVALUATION
Department of Anesthesiology  Postprocedure Note    Patient: Westley Rios  MRN: [id number]  YOB: 1926  Date of evaluation: 1/1/2024    Procedure Summary       Date: 01/01/24 Room / Location: [address]    Anesthesia Start: 1045 Anesthesia Stop: 1132    Procedure: EXCISIONAL DEBRIDEMENT RIGHT LOWER EXTREMITY WOUND AND WOUND VAC PLACEMENT (Right: Leg Lower) Diagnosis:       Abscess      (Abscess [L02.91])    Surgeons: Cristian Bermudez DO Responsible Provider: Dima Conley MD    Anesthesia Type: MAC ASA Status: 4            Anesthesia Type: MAC    Cristina Phase I: Cristina Score: 8    Cristina Phase II:      Anesthesia Post Evaluation    Patient location during evaluation: bedside  Patient participation: complete - patient participated  Level of consciousness: awake  Pain score: 0  Airway patency: patent  Nausea & Vomiting: no vomiting and no nausea  Cardiovascular status: hemodynamically stable  Respiratory status: acceptable, airway suctioned, spontaneous ventilation and face mask  Hydration status: stable  Pain management: adequate    No notable events documented.

## 2024-01-01 NOTE — PLAN OF CARE
Attempt-defer PT eval today.  Patient in OR for procedure this morning.  Will f/u on different day.  John Poole, PT [id number]  10:09 AM 1/1/2024

## 2024-01-02 LAB
ANION GAP SERPL CALCULATED.3IONS-SCNC: 10 MMOL/L (ref 7–16)
ANISOCYTOSIS: ABNORMAL
ANTI-XA UNFRAC HEPARIN: 0.51 IU/ML (ref 0.3–0.7)
BANDED NEUTROPHILS ABSOLUTE COUNT: 0.51 K/CU MM
BANDED NEUTROPHILS RELATIVE PERCENT: 3 % (ref 5–11)
BASOPHILS ABSOLUTE: 0.3 K/CU MM
BASOPHILS RELATIVE PERCENT: 2 % (ref 0–1)
BUN SERPL-MCNC: 18 MG/DL (ref 6–23)
CALCIUM SERPL-MCNC: 7.6 MG/DL (ref 8.3–10.6)
CHLORIDE BLD-SCNC: 105 MMOL/L (ref 99–110)
CO2: 22 MMOL/L (ref 21–32)
CREAT SERPL-MCNC: 1.5 MG/DL (ref 0.9–1.3)
CULTURE: ABNORMAL
CULTURE: ABNORMAL
CULTURE: NORMAL
CULTURE: NORMAL
DIFFERENTIAL TYPE: ABNORMAL
DOSE AMOUNT: NORMAL
DOSE TIME: NORMAL
EOSINOPHILS ABSOLUTE: 0.2 K/CU MM
EOSINOPHILS RELATIVE PERCENT: 1 % (ref 0–3)
GFR SERPL CREATININE-BSD FRML MDRD: 42 ML/MIN/1.73M2
GLUCOSE SERPL-MCNC: 93 MG/DL (ref 70–99)
HCT VFR BLD CALC: 20.7 % (ref 42–52)
HCT VFR BLD CALC: 24.9 % (ref 42–52)
HEMOGLOBIN: 6.4 GM/DL (ref 13.5–18)
HEMOGLOBIN: 8 GM/DL (ref 13.5–18)
HYPOCHROMIA: ABNORMAL
LYMPHOCYTES ABSOLUTE: 2.2 K/CU MM
LYMPHOCYTES RELATIVE PERCENT: 13 % (ref 24–44)
Lab: ABNORMAL
Lab: NORMAL
Lab: NORMAL
MCH RBC QN AUTO: 29.9 PG (ref 27–31)
MCHC RBC AUTO-ENTMCNC: 30.9 % (ref 32–36)
MCV RBC AUTO: 96.7 FL (ref 78–100)
PDW BLD-RTO: 17.1 % (ref 11.7–14.9)
PLATELET # BLD: 272 K/CU MM (ref 140–440)
PMV BLD AUTO: 10.8 FL (ref 7.5–11.1)
POTASSIUM SERPL-SCNC: 4.2 MMOL/L (ref 3.5–5.1)
RBC # BLD: 2.14 M/CU MM (ref 4.6–6.2)
SEGMENTED NEUTROPHILS ABSOLUTE COUNT: 13.7 K/CU MM
SEGMENTED NEUTROPHILS RELATIVE PERCENT: 81 % (ref 36–66)
SODIUM BLD-SCNC: 137 MMOL/L (ref 135–145)
SPECIMEN: ABNORMAL
SPECIMEN: NORMAL
SPECIMEN: NORMAL
VANCOMYCIN RANDOM: 12.9 UG/ML
WBC # BLD: 16.9 K/CU MM (ref 4–10.5)

## 2024-01-02 PROCEDURE — 85018 HEMOGLOBIN: CPT

## 2024-01-02 PROCEDURE — 80048 BASIC METABOLIC PNL TOTAL CA: CPT

## 2024-01-02 PROCEDURE — 93005 ELECTROCARDIOGRAM TRACING: CPT | Performed by: STUDENT IN AN ORGANIZED HEALTH CARE EDUCATION/TRAINING PROGRAM

## 2024-01-02 PROCEDURE — 99232 SBSQ HOSP IP/OBS MODERATE 35: CPT | Performed by: SURGERY

## 2024-01-02 PROCEDURE — 2580000003 HC RX 258: Performed by: SURGERY

## 2024-01-02 PROCEDURE — 6360000002 HC RX W HCPCS: Performed by: STUDENT IN AN ORGANIZED HEALTH CARE EDUCATION/TRAINING PROGRAM

## 2024-01-02 PROCEDURE — 2500000003 HC RX 250 WO HCPCS: Performed by: SURGERY

## 2024-01-02 PROCEDURE — 2580000003 HC RX 258: Performed by: STUDENT IN AN ORGANIZED HEALTH CARE EDUCATION/TRAINING PROGRAM

## 2024-01-02 PROCEDURE — 6370000000 HC RX 637 (ALT 250 FOR IP): Performed by: SURGERY

## 2024-01-02 PROCEDURE — 2700000000 HC OXYGEN THERAPY PER DAY

## 2024-01-02 PROCEDURE — 80202 ASSAY OF VANCOMYCIN: CPT

## 2024-01-02 PROCEDURE — 85014 HEMATOCRIT: CPT

## 2024-01-02 PROCEDURE — 36430 TRANSFUSION BLD/BLD COMPNT: CPT

## 2024-01-02 PROCEDURE — 2140000000 HC CCU INTERMEDIATE R&B

## 2024-01-02 PROCEDURE — 85025 COMPLETE CBC W/AUTO DIFF WBC: CPT

## 2024-01-02 PROCEDURE — 85520 HEPARIN ASSAY: CPT

## 2024-01-02 PROCEDURE — P9016 RBC LEUKOCYTES REDUCED: HCPCS

## 2024-01-02 PROCEDURE — 94761 N-INVAS EAR/PLS OXIMETRY MLT: CPT

## 2024-01-02 PROCEDURE — 76937 US GUIDE VASCULAR ACCESS: CPT

## 2024-01-02 PROCEDURE — 36415 COLL VENOUS BLD VENIPUNCTURE: CPT

## 2024-01-02 RX ORDER — SODIUM CHLORIDE 9 MG/ML
INJECTION, SOLUTION INTRAVENOUS PRN
Status: DISCONTINUED | OUTPATIENT
Start: 2024-01-02 | End: 2024-01-09

## 2024-01-02 RX ADMIN — TRAZODONE HYDROCHLORIDE 50 MG: 50 TABLET ORAL at 20:21

## 2024-01-02 RX ADMIN — ROPINIROLE HYDROCHLORIDE 0.75 MG: 0.25 TABLET, FILM COATED ORAL at 20:21

## 2024-01-02 RX ADMIN — EZETIMIBE 10 MG: 10 TABLET ORAL at 20:21

## 2024-01-02 RX ADMIN — VANCOMYCIN HYDROCHLORIDE 1250 MG: 1.25 INJECTION, POWDER, LYOPHILIZED, FOR SOLUTION INTRAVENOUS at 14:35

## 2024-01-02 RX ADMIN — HEPARIN SODIUM 16 UNITS/KG/HR: 10000 INJECTION, SOLUTION INTRAVENOUS at 07:24

## 2024-01-02 RX ADMIN — SODIUM CHLORIDE, PRESERVATIVE FREE 10 ML: 5 INJECTION INTRAVENOUS at 20:21

## 2024-01-02 RX ADMIN — SODIUM CHLORIDE, PRESERVATIVE FREE 10 ML: 5 INJECTION INTRAVENOUS at 10:09

## 2024-01-02 ASSESSMENT — PAIN SCALES - GENERAL
PAINLEVEL_OUTOF10: 0

## 2024-01-02 NOTE — CONSENT
Informed Consent for Blood Component Transfusion Note    I have discussed with the patient the rationale for blood component transfusion; its benefits in treating or preventing fatigue, organ damage, or death; and its risk which includes mild transfusion reactions, rare risk of blood borne infection, or more serious but rare reactions. I have discussed the alternatives to transfusion, including the risk and consequences of not receiving transfusion. The patient had an opportunity to ask questions and had agreed to proceed with transfusion of blood components.    Electronically signed by Siddharth Ryder MD on 1/2/24 at 4:51 PM EST

## 2024-01-02 NOTE — PLAN OF CARE
Problem: Discharge Planning  Goal: Discharge to home or other facility with appropriate resources  1/2/2024 1358 by Sammi Hi RN  Outcome: Progressing  1/2/2024 0354 by Tye Florez RN  Outcome: Progressing     Problem: Safety - Adult  Goal: Free from fall injury  1/2/2024 1358 by Sammi Hi RN  Outcome: Progressing  1/2/2024 0354 by Tye Florez, RN  Outcome: Progressing     Problem: ABCDS Injury Assessment  Goal: Absence of physical injury  1/2/2024 1358 by Sammi Hi RN  Outcome: Progressing  1/2/2024 0354 by Tye Florez RN  Outcome: Progressing     Problem: Skin/Tissue Integrity  Goal: Absence of new skin breakdown  Description: 1.  Monitor for areas of redness and/or skin breakdown  2.  Assess vascular access sites hourly  3.  Every 4-6 hours minimum:  Change oxygen saturation probe site  4.  Every 4-6 hours:  If on nasal continuous positive airway pressure, respiratory therapy assess nares and determine need for appliance change or resting period.  1/2/2024 1358 by Sammi Hi RN  Outcome: Progressing  1/2/2024 0354 by Tye Florez RN  Outcome: Progressing     Problem: Pain  Goal: Verbalizes/displays adequate comfort level or baseline comfort level  1/2/2024 1358 by Sammi Hi RN  Outcome: Progressing  1/2/2024 0354 by Tye Florez, RN  Outcome: Progressing     Problem: Nutrition Deficit:  Goal: Optimize nutritional status  1/2/2024 1358 by Sammi Hi RN  Outcome: Progressing  1/2/2024 1203 by Rosalie Fontaine RD, LD  Outcome: Progressing  Flowsheets (Taken 1/2/2024 1203)  Nutrient intake appropriate for improving, restoring, or maintaining nutritional needs:   Assess nutritional status and recommend course of action   Monitor oral intake, labs, and treatment plans   Recommend appropriate diets, oral nutritional supplements, and vitamin/mineral supplements  1/2/2024 0354 by Tye Florez

## 2024-01-02 NOTE — CARE COORDINATION
Met with pt for a f/u visit.  Pt states that he is still planning to return home.  Pt has visitors at bedside and they informed CM that pt's daughter/Elizabeth makes his decisions.  CM called pt's daughter/Elizabeth to discuss concern of pt wanting to go home d/t he is weak.  She informed CM that pt will be going to skilled at [facility].  [place] from [place] at [facility].  CM called Anh/[facility] and she informed CM that they will be able to accept pt once a pre-cert is obtained.  D/c plan is for skilled at [facility].  Will require a pre-cert when medically stable enough to work with PT/OT.  D/c instructions are on front of packet located with the soft chart.  Requested PT/OT to see pt when medically stable so the pre-cert can be started.  TE

## 2024-01-02 NOTE — DISCHARGE INSTR - COC
Dressing Status Other (Comment) 12/29/23 0800   Wound Cleansed Cleansed with saline 12/29/23 0800   Dressing/Treatment Silicone border 01/02/24 1000   Wound Length (cm) 2.5 cm 12/29/23 0800   Wound Width (cm) 3 cm 12/29/23 0800   Wound Depth (cm) 0 cm 12/29/23 0800   Wound Surface Area (cm^2) 7.5 cm^2 12/29/23 0800   Wound Volume (cm^3) 0 cm^3 12/29/23 0800   Distance Tunneling (cm) 0 cm 12/29/23 0800   Tunneling Position ___ O'Clock 0 12/29/23 0800   Undermining Starts ___ O'Clock 0 12/29/23 0800   Undermining Ends___ O'Clock 0 12/29/23 0800   Undermining Maxium Distance (cm) 0 12/29/23 0800   Wound Assessment Eschar dry 01/02/24 1000   Drainage Amount None (dry) 01/02/24 1000   Odor None 01/02/24 1000   Meron-wound Assessment Hyperpigmented;Non-blanchable erythema 12/29/23 0800   Margins Attached edges 12/29/23 0800   Number of days: 5       Wound Ankle Right lateral (Active)   Wound Image   12/29/23 0800   Wound Etiology Traumatic 12/29/23 0800   Dressing Status Intact;Clean;Dry 01/02/24 1000   Wound Cleansed Not Cleansed 01/02/24 1000   Dressing/Treatment Silicone border 01/02/24 1000   Wound Length (cm) 1.5 cm 12/29/23 0800   Wound Width (cm) 0.9 cm 12/29/23 0800   Wound Depth (cm) 0.1 cm 12/29/23 0800   Wound Surface Area (cm^2) 1.35 cm^2 12/29/23 0800   Wound Volume (cm^3) 0.135 cm^3 12/29/23 0800   Distance Tunneling (cm) 0 cm 12/29/23 0800   Tunneling Position ___ O'Clock 0 12/29/23 0800   Undermining Starts ___ O'Clock 0 12/29/23 0800   Undermining Ends___ O'Clock 0 12/29/23 0800   Undermining Maxium Distance (cm) 0 12/29/23 0800   Wound Assessment Fluid filled blister 01/02/24 1000   Drainage Amount None (dry) 01/02/24 1000   Odor None 01/02/24 1000   Meron-wound Assessment Intact 01/02/24 1000   Margins Attached edges 01/02/24 1000   Wound Thickness Description not for Pressure Injury Partial thickness 01/02/24 1000   Number of days:        Incision 11/24/23 Hip Right;Lateral (Active)   Wound Image

## 2024-01-02 NOTE — PLAN OF CARE
Problem: Discharge Planning  Goal: Discharge to home or other facility with appropriate resources  1/2/2024 0354 by Tye Florez RN  Outcome: Progressing  1/1/2024 1758 by Ramo Quiros RN  Outcome: Progressing     Problem: Safety - Adult  Goal: Free from fall injury  1/2/2024 0354 by Tye Florez RN  Outcome: Progressing  1/1/2024 1758 by Ramo Quiros RN  Outcome: Progressing     Problem: ABCDS Injury Assessment  Goal: Absence of physical injury  1/2/2024 0354 by Tye Florez RN  Outcome: Progressing  1/1/2024 1758 by Ramo Quiros RN  Outcome: Progressing     Problem: Skin/Tissue Integrity  Goal: Absence of new skin breakdown  Description: 1.  Monitor for areas of redness and/or skin breakdown  2.  Assess vascular access sites hourly  3.  Every 4-6 hours minimum:  Change oxygen saturation probe site  4.  Every 4-6 hours:  If on nasal continuous positive airway pressure, respiratory therapy assess nares and determine need for appliance change or resting period.  1/2/2024 0354 by Tye Florez RN  Outcome: Progressing  1/1/2024 1758 by Ramo Quiros RN  Outcome: Progressing     Problem: Pain  Goal: Verbalizes/displays adequate comfort level or baseline comfort level  1/2/2024 0354 by Tye Florez RN  Outcome: Progressing  1/1/2024 1758 by Ramo Quiros RN  Outcome: Progressing     Problem: Nutrition Deficit:  Goal: Optimize nutritional status  1/2/2024 0354 by Tye Florez RN  Outcome: Progressing  1/1/2024 1758 by Ramo Quiros RN  Outcome: Progressing

## 2024-01-03 ENCOUNTER — APPOINTMENT (OUTPATIENT)
Dept: INTERVENTIONAL RADIOLOGY/VASCULAR | Age: 89
DRG: 854 | End: 2024-01-03
Payer: MEDICARE

## 2024-01-03 LAB
ABO/RH: NORMAL
ANION GAP SERPL CALCULATED.3IONS-SCNC: 9 MMOL/L (ref 7–16)
ANISOCYTOSIS: ABNORMAL
ANTI-XA UNFRAC HEPARIN: <0.1 IU/ML (ref 0.3–0.7)
ANTIBODY SCREEN: NEGATIVE
BANDED NEUTROPHILS ABSOLUTE COUNT: 0.58 K/CU MM
BANDED NEUTROPHILS RELATIVE PERCENT: 5 % (ref 5–11)
BASOPHILS ABSOLUTE: 0.1 K/CU MM
BASOPHILS RELATIVE PERCENT: 1 % (ref 0–1)
BUN SERPL-MCNC: 16 MG/DL (ref 6–23)
CALCIUM SERPL-MCNC: 7.6 MG/DL (ref 8.3–10.6)
CHLORIDE BLD-SCNC: 108 MMOL/L (ref 99–110)
CO2: 24 MMOL/L (ref 21–32)
COMPONENT: NORMAL
CREAT SERPL-MCNC: 1.5 MG/DL (ref 0.9–1.3)
CROSSMATCH RESULT: NORMAL
CULTURE: ABNORMAL
CULTURE: ABNORMAL
DIFFERENTIAL TYPE: ABNORMAL
EKG ATRIAL RATE: 87 BPM
EKG DIAGNOSIS: NORMAL
EKG P AXIS: 90 DEGREES
EKG P-R INTERVAL: 160 MS
EKG Q-T INTERVAL: 470 MS
EKG QRS DURATION: 198 MS
EKG QTC CALCULATION (BAZETT): 565 MS
EKG R AXIS: -65 DEGREES
EKG T AXIS: 94 DEGREES
EKG VENTRICULAR RATE: 87 BPM
EOSINOPHILS ABSOLUTE: 0.3 K/CU MM
EOSINOPHILS RELATIVE PERCENT: 3 % (ref 0–3)
GFR SERPL CREATININE-BSD FRML MDRD: 42 ML/MIN/1.73M2
GLUCOSE SERPL-MCNC: 103 MG/DL (ref 70–99)
GRAM SMEAR: ABNORMAL
HCT VFR BLD CALC: 24.1 % (ref 42–52)
HEMOGLOBIN: 7.5 GM/DL (ref 13.5–18)
LYMPHOCYTES ABSOLUTE: 1.4 K/CU MM
LYMPHOCYTES RELATIVE PERCENT: 12 % (ref 24–44)
Lab: ABNORMAL
MCH RBC QN AUTO: 30.6 PG (ref 27–31)
MCHC RBC AUTO-ENTMCNC: 31.1 % (ref 32–36)
MCV RBC AUTO: 98.4 FL (ref 78–100)
METAMYELOCYTES ABSOLUTE COUNT: 0.23 K/CU MM
METAMYELOCYTES PERCENT: 2 %
MONOCYTES ABSOLUTE: 0.3 K/CU MM
MONOCYTES RELATIVE PERCENT: 3 % (ref 0–4)
PDW BLD-RTO: 18.5 % (ref 11.7–14.9)
PLATELET # BLD: 250 K/CU MM (ref 140–440)
PMV BLD AUTO: 10 FL (ref 7.5–11.1)
POTASSIUM SERPL-SCNC: 4.4 MMOL/L (ref 3.5–5.1)
RBC # BLD: 2.45 M/CU MM (ref 4.6–6.2)
RBC # BLD: ABNORMAL 10*6/UL
SEGMENTED NEUTROPHILS ABSOLUTE COUNT: 8.7 K/CU MM
SEGMENTED NEUTROPHILS RELATIVE PERCENT: 74 % (ref 36–66)
SODIUM BLD-SCNC: 141 MMOL/L (ref 135–145)
SPECIMEN: ABNORMAL
STATUS: NORMAL
TRANSFUSION STATUS: NORMAL
UNIT DIVISION: 0
UNIT NUMBER: NORMAL
WBC # BLD: 11.6 K/CU MM (ref 4–10.5)

## 2024-01-03 PROCEDURE — 6360000004 HC RX CONTRAST MEDICATION

## 2024-01-03 PROCEDURE — 6370000000 HC RX 637 (ALT 250 FOR IP): Performed by: SURGERY

## 2024-01-03 PROCEDURE — 6360000002 HC RX W HCPCS

## 2024-01-03 PROCEDURE — 6360000002 HC RX W HCPCS: Performed by: SURGERY

## 2024-01-03 PROCEDURE — 97535 SELF CARE MNGMENT TRAINING: CPT

## 2024-01-03 PROCEDURE — 99232 SBSQ HOSP IP/OBS MODERATE 35: CPT | Performed by: SURGERY

## 2024-01-03 PROCEDURE — C1769 GUIDE WIRE: HCPCS

## 2024-01-03 PROCEDURE — 2580000003 HC RX 258: Performed by: SURGERY

## 2024-01-03 PROCEDURE — 97162 PT EVAL MOD COMPLEX 30 MIN: CPT

## 2024-01-03 PROCEDURE — 85520 HEPARIN ASSAY: CPT

## 2024-01-03 PROCEDURE — 85007 BL SMEAR W/DIFF WBC COUNT: CPT

## 2024-01-03 PROCEDURE — 80048 BASIC METABOLIC PNL TOTAL CA: CPT

## 2024-01-03 PROCEDURE — 2700000000 HC OXYGEN THERAPY PER DAY

## 2024-01-03 PROCEDURE — 36415 COLL VENOUS BLD VENIPUNCTURE: CPT

## 2024-01-03 PROCEDURE — 06H03DZ INSERTION OF INTRALUMINAL DEVICE INTO INFERIOR VENA CAVA, PERCUTANEOUS APPROACH: ICD-10-PCS | Performed by: RADIOLOGY

## 2024-01-03 PROCEDURE — 2500000003 HC RX 250 WO HCPCS: Performed by: RADIOLOGY

## 2024-01-03 PROCEDURE — 2140000000 HC CCU INTERMEDIATE R&B

## 2024-01-03 PROCEDURE — 94761 N-INVAS EAR/PLS OXIMETRY MLT: CPT

## 2024-01-03 PROCEDURE — 37191 INS ENDOVAS VENA CAVA FILTR: CPT

## 2024-01-03 PROCEDURE — 97166 OT EVAL MOD COMPLEX 45 MIN: CPT

## 2024-01-03 PROCEDURE — 97530 THERAPEUTIC ACTIVITIES: CPT

## 2024-01-03 PROCEDURE — 6360000004 HC RX CONTRAST MEDICATION: Performed by: RADIOLOGY

## 2024-01-03 PROCEDURE — 93010 ELECTROCARDIOGRAM REPORT: CPT | Performed by: INTERNAL MEDICINE

## 2024-01-03 PROCEDURE — 85027 COMPLETE CBC AUTOMATED: CPT

## 2024-01-03 PROCEDURE — 97606 NEG PRS WND THER DME>50 SQCM: CPT

## 2024-01-03 RX ORDER — LIDOCAINE HYDROCHLORIDE 10 MG/ML
INJECTION, SOLUTION EPIDURAL; INFILTRATION; INTRACAUDAL; PERINEURAL PRN
Status: COMPLETED | OUTPATIENT
Start: 2024-01-03 | End: 2024-01-03

## 2024-01-03 RX ADMIN — ROPINIROLE HYDROCHLORIDE 0.75 MG: 0.25 TABLET, FILM COATED ORAL at 20:35

## 2024-01-03 RX ADMIN — SODIUM CHLORIDE, PRESERVATIVE FREE 10 ML: 5 INJECTION INTRAVENOUS at 08:39

## 2024-01-03 RX ADMIN — MORPHINE SULFATE 2 MG: 2 INJECTION, SOLUTION INTRAMUSCULAR; INTRAVENOUS at 14:13

## 2024-01-03 RX ADMIN — EZETIMIBE 10 MG: 10 TABLET ORAL at 20:34

## 2024-01-03 RX ADMIN — IOPAMIDOL 16 ML: 755 INJECTION, SOLUTION INTRAVENOUS at 11:09

## 2024-01-03 RX ADMIN — TRAZODONE HYDROCHLORIDE 50 MG: 50 TABLET ORAL at 20:34

## 2024-01-03 RX ADMIN — SODIUM CHLORIDE, PRESERVATIVE FREE 10 ML: 5 INJECTION INTRAVENOUS at 20:35

## 2024-01-03 RX ADMIN — HYDROCODONE BITARTRATE AND ACETAMINOPHEN 1 TABLET: 5; 325 TABLET ORAL at 15:12

## 2024-01-03 RX ADMIN — LIDOCAINE HYDROCHLORIDE 5 ML: 10 INJECTION, SOLUTION EPIDURAL; INFILTRATION; INTRACAUDAL; PERINEURAL at 10:56

## 2024-01-03 ASSESSMENT — PAIN SCALES - WONG BAKER
WONGBAKER_NUMERICALRESPONSE: 2
WONGBAKER_NUMERICALRESPONSE: 2

## 2024-01-03 ASSESSMENT — PAIN DESCRIPTION - DESCRIPTORS
DESCRIPTORS: ACHING;THROBBING
DESCRIPTORS: ACHING;THROBBING

## 2024-01-03 ASSESSMENT — PAIN SCALES - GENERAL
PAINLEVEL_OUTOF10: 8
PAINLEVEL_OUTOF10: 8

## 2024-01-03 ASSESSMENT — PAIN DESCRIPTION - LOCATION
LOCATION: LEG
LOCATION: LEG

## 2024-01-03 ASSESSMENT — PAIN DESCRIPTION - ORIENTATION
ORIENTATION: RIGHT;POSTERIOR
ORIENTATION: RIGHT;POSTERIOR

## 2024-01-03 NOTE — CARE COORDINATION
Discussed pt in IDR.   states pt may be medically ready in 2 days.  INGRIS/Teri to start pre-cert when PT/OT notes are in.  PT/OT is signed up to see pt today.  TE

## 2024-01-03 NOTE — CARE COORDINATION
Submitted precert via [employer] portal/ pending at this time  Certificate Information  Reference Number  [id number]  Status  PENDED    Review Reason 1  Requires Medical Review

## 2024-01-04 LAB
ANION GAP SERPL CALCULATED.3IONS-SCNC: 10 MMOL/L (ref 7–16)
ANISOCYTOSIS: ABNORMAL
ANTI-XA UNFRAC HEPARIN: <0.1 IU/ML (ref 0.3–0.7)
ANTI-XA UNFRAC HEPARIN: <0.1 IU/ML (ref 0.3–0.7)
BASOPHILS ABSOLUTE: 0.1 K/CU MM
BASOPHILS RELATIVE PERCENT: 1 % (ref 0–1)
BUN SERPL-MCNC: 15 MG/DL (ref 6–23)
CALCIUM SERPL-MCNC: 7.9 MG/DL (ref 8.3–10.6)
CHLORIDE BLD-SCNC: 106 MMOL/L (ref 99–110)
CO2: 25 MMOL/L (ref 21–32)
CREAT SERPL-MCNC: 1.6 MG/DL (ref 0.9–1.3)
CRP SERPL HS-MCNC: 9.3 MG/L
DIFFERENTIAL TYPE: ABNORMAL
DOSE AMOUNT: NORMAL
DOSE TIME: NORMAL
EOSINOPHILS ABSOLUTE: 0.1 K/CU MM
EOSINOPHILS RELATIVE PERCENT: 1 % (ref 0–3)
GFR SERPL CREATININE-BSD FRML MDRD: 39 ML/MIN/1.73M2
GLUCOSE SERPL-MCNC: 103 MG/DL (ref 70–99)
HCT VFR BLD CALC: 26.4 % (ref 42–52)
HEMOGLOBIN: 8.3 GM/DL (ref 13.5–18)
LYMPHOCYTES ABSOLUTE: 1.5 K/CU MM
LYMPHOCYTES RELATIVE PERCENT: 13 % (ref 24–44)
MCH RBC QN AUTO: 30.5 PG (ref 27–31)
MCHC RBC AUTO-ENTMCNC: 31.4 % (ref 32–36)
MCV RBC AUTO: 97.1 FL (ref 78–100)
METAMYELOCYTES ABSOLUTE COUNT: 0.12 K/CU MM
METAMYELOCYTES PERCENT: 1 %
MONOCYTES ABSOLUTE: 0.7 K/CU MM
MONOCYTES RELATIVE PERCENT: 6 % (ref 0–4)
MYELOCYTE PERCENT: 1 %
MYELOCYTES ABSOLUTE COUNT: 0.12 K/CU MM
PDW BLD-RTO: 19.3 % (ref 11.7–14.9)
PLATELET # BLD: 256 K/CU MM (ref 140–440)
PMV BLD AUTO: 10 FL (ref 7.5–11.1)
POTASSIUM SERPL-SCNC: 4.2 MMOL/L (ref 3.5–5.1)
PROCALCITONIN SERPL-MCNC: 0.1 NG/ML
RBC # BLD: 2.72 M/CU MM (ref 4.6–6.2)
RBC # BLD: ABNORMAL 10*6/UL
SEGMENTED NEUTROPHILS ABSOLUTE COUNT: 9.3 K/CU MM
SEGMENTED NEUTROPHILS RELATIVE PERCENT: 77 % (ref 36–66)
SODIUM BLD-SCNC: 141 MMOL/L (ref 135–145)
VANCOMYCIN RANDOM: 16.6 UG/ML
WBC # BLD: 11.9 K/CU MM (ref 4–10.5)

## 2024-01-04 PROCEDURE — 80202 ASSAY OF VANCOMYCIN: CPT

## 2024-01-04 PROCEDURE — 97112 NEUROMUSCULAR REEDUCATION: CPT

## 2024-01-04 PROCEDURE — 99232 SBSQ HOSP IP/OBS MODERATE 35: CPT | Performed by: NURSE PRACTITIONER

## 2024-01-04 PROCEDURE — 97530 THERAPEUTIC ACTIVITIES: CPT

## 2024-01-04 PROCEDURE — 2580000003 HC RX 258: Performed by: SURGERY

## 2024-01-04 PROCEDURE — 80048 BASIC METABOLIC PNL TOTAL CA: CPT

## 2024-01-04 PROCEDURE — 85007 BL SMEAR W/DIFF WBC COUNT: CPT

## 2024-01-04 PROCEDURE — 6370000000 HC RX 637 (ALT 250 FOR IP): Performed by: SURGERY

## 2024-01-04 PROCEDURE — 99223 1ST HOSP IP/OBS HIGH 75: CPT | Performed by: INTERNAL MEDICINE

## 2024-01-04 PROCEDURE — 6360000002 HC RX W HCPCS: Performed by: SURGERY

## 2024-01-04 PROCEDURE — 86140 C-REACTIVE PROTEIN: CPT

## 2024-01-04 PROCEDURE — 85520 HEPARIN ASSAY: CPT

## 2024-01-04 PROCEDURE — 97535 SELF CARE MNGMENT TRAINING: CPT

## 2024-01-04 PROCEDURE — 2140000000 HC CCU INTERMEDIATE R&B

## 2024-01-04 PROCEDURE — APPSS60 APP SPLIT SHARED TIME 46-60 MINUTES: Performed by: NURSE PRACTITIONER

## 2024-01-04 PROCEDURE — APPNB45 APP NON BILLABLE 31-45 MINUTES: Performed by: NURSE PRACTITIONER

## 2024-01-04 PROCEDURE — 85027 COMPLETE CBC AUTOMATED: CPT

## 2024-01-04 PROCEDURE — 94761 N-INVAS EAR/PLS OXIMETRY MLT: CPT

## 2024-01-04 PROCEDURE — 2500000003 HC RX 250 WO HCPCS: Performed by: SURGERY

## 2024-01-04 PROCEDURE — 36415 COLL VENOUS BLD VENIPUNCTURE: CPT

## 2024-01-04 PROCEDURE — 84145 PROCALCITONIN (PCT): CPT

## 2024-01-04 RX ADMIN — TRAZODONE HYDROCHLORIDE 50 MG: 50 TABLET ORAL at 22:32

## 2024-01-04 RX ADMIN — ROPINIROLE HYDROCHLORIDE 0.75 MG: 0.25 TABLET, FILM COATED ORAL at 22:32

## 2024-01-04 RX ADMIN — SODIUM CHLORIDE, PRESERVATIVE FREE 5 ML: 5 INJECTION INTRAVENOUS at 09:00

## 2024-01-04 RX ADMIN — METOPROLOL SUCCINATE 12.5 MG: 25 TABLET, EXTENDED RELEASE ORAL at 08:57

## 2024-01-04 RX ADMIN — EZETIMIBE 10 MG: 10 TABLET ORAL at 22:32

## 2024-01-04 RX ADMIN — HEPARIN SODIUM 18 UNITS/KG/HR: 10000 INJECTION, SOLUTION INTRAVENOUS at 16:59

## 2024-01-04 RX ADMIN — VANCOMYCIN HYDROCHLORIDE 750 MG: 750 INJECTION, POWDER, LYOPHILIZED, FOR SOLUTION INTRAVENOUS at 15:35

## 2024-01-04 NOTE — CARE COORDINATION
Pre-cert pending for Kristen.  INGRIS/Teri to notify CM when pre-cert received.  Rapid covid required on day of d/c.  D/c instructions are on front of packet located with the soft chart.  TE

## 2024-01-05 PROBLEM — A49.02 MRSA INFECTION: Status: ACTIVE | Noted: 2024-01-05

## 2024-01-05 LAB
ANION GAP SERPL CALCULATED.3IONS-SCNC: 8 MMOL/L (ref 7–16)
ANISOCYTOSIS: ABNORMAL
ANTI-XA UNFRAC HEPARIN: 0.4 IU/ML (ref 0.3–0.7)
ANTI-XA UNFRAC HEPARIN: 0.41 IU/ML (ref 0.3–0.7)
ANTI-XA UNFRAC HEPARIN: <0.1 IU/ML (ref 0.3–0.7)
ANTI-XA UNFRAC HEPARIN: <0.1 IU/ML (ref 0.3–0.7)
BUN SERPL-MCNC: 14 MG/DL (ref 6–23)
CALCIUM SERPL-MCNC: 7.5 MG/DL (ref 8.3–10.6)
CHLORIDE BLD-SCNC: 106 MMOL/L (ref 99–110)
CO2: 24 MMOL/L (ref 21–32)
CREAT SERPL-MCNC: 1.5 MG/DL (ref 0.9–1.3)
CRP SERPL HS-MCNC: 9.6 MG/L
DIFFERENTIAL TYPE: ABNORMAL
EOSINOPHILS ABSOLUTE: 0.1 K/CU MM
EOSINOPHILS RELATIVE PERCENT: 1 % (ref 0–3)
GFR SERPL CREATININE-BSD FRML MDRD: 42 ML/MIN/1.73M2
GLUCOSE SERPL-MCNC: 117 MG/DL (ref 70–99)
HCT VFR BLD CALC: 25.7 % (ref 42–52)
HEMOGLOBIN: 7.7 GM/DL (ref 13.5–18)
LYMPHOCYTES ABSOLUTE: 1.9 K/CU MM
LYMPHOCYTES RELATIVE PERCENT: 15 % (ref 24–44)
MCH RBC QN AUTO: 30.3 PG (ref 27–31)
MCHC RBC AUTO-ENTMCNC: 30 % (ref 32–36)
MCV RBC AUTO: 101.2 FL (ref 78–100)
METAMYELOCYTES ABSOLUTE COUNT: 0.13 K/CU MM
METAMYELOCYTES PERCENT: 1 %
MONOCYTES ABSOLUTE: 0.4 K/CU MM
MONOCYTES RELATIVE PERCENT: 3 % (ref 0–4)
PDW BLD-RTO: 19.5 % (ref 11.7–14.9)
PLATELET # BLD: 257 K/CU MM (ref 140–440)
PMV BLD AUTO: 9.6 FL (ref 7.5–11.1)
POTASSIUM SERPL-SCNC: 4.5 MMOL/L (ref 3.5–5.1)
RBC # BLD: 2.54 M/CU MM (ref 4.6–6.2)
SEGMENTED NEUTROPHILS ABSOLUTE COUNT: 10.3 K/CU MM
SEGMENTED NEUTROPHILS RELATIVE PERCENT: 80 % (ref 36–66)
SODIUM BLD-SCNC: 138 MMOL/L (ref 135–145)
WBC # BLD: 12.8 K/CU MM (ref 4–10.5)

## 2024-01-05 PROCEDURE — 6370000000 HC RX 637 (ALT 250 FOR IP): Performed by: SURGERY

## 2024-01-05 PROCEDURE — 36415 COLL VENOUS BLD VENIPUNCTURE: CPT

## 2024-01-05 PROCEDURE — 2580000003 HC RX 258: Performed by: SURGERY

## 2024-01-05 PROCEDURE — 85007 BL SMEAR W/DIFF WBC COUNT: CPT

## 2024-01-05 PROCEDURE — 80048 BASIC METABOLIC PNL TOTAL CA: CPT

## 2024-01-05 PROCEDURE — 94761 N-INVAS EAR/PLS OXIMETRY MLT: CPT

## 2024-01-05 PROCEDURE — 85520 HEPARIN ASSAY: CPT

## 2024-01-05 PROCEDURE — 2140000000 HC CCU INTERMEDIATE R&B

## 2024-01-05 PROCEDURE — 99232 SBSQ HOSP IP/OBS MODERATE 35: CPT | Performed by: SURGERY

## 2024-01-05 PROCEDURE — 97606 NEG PRS WND THER DME>50 SQCM: CPT

## 2024-01-05 PROCEDURE — 99232 SBSQ HOSP IP/OBS MODERATE 35: CPT | Performed by: NURSE PRACTITIONER

## 2024-01-05 PROCEDURE — 85027 COMPLETE CBC AUTOMATED: CPT

## 2024-01-05 PROCEDURE — 86140 C-REACTIVE PROTEIN: CPT

## 2024-01-05 RX ADMIN — METOPROLOL SUCCINATE 12.5 MG: 25 TABLET, EXTENDED RELEASE ORAL at 09:21

## 2024-01-05 RX ADMIN — EZETIMIBE 10 MG: 10 TABLET ORAL at 20:21

## 2024-01-05 RX ADMIN — SODIUM CHLORIDE, PRESERVATIVE FREE 10 ML: 5 INJECTION INTRAVENOUS at 20:21

## 2024-01-05 RX ADMIN — TRAZODONE HYDROCHLORIDE 50 MG: 50 TABLET ORAL at 20:21

## 2024-01-05 RX ADMIN — ROPINIROLE HYDROCHLORIDE 0.75 MG: 0.25 TABLET, FILM COATED ORAL at 20:21

## 2024-01-05 RX ADMIN — HYDROCODONE BITARTRATE AND ACETAMINOPHEN 1 TABLET: 5; 325 TABLET ORAL at 09:21

## 2024-01-05 RX ADMIN — SODIUM CHLORIDE, PRESERVATIVE FREE 10 ML: 5 INJECTION INTRAVENOUS at 09:26

## 2024-01-05 ASSESSMENT — PAIN - FUNCTIONAL ASSESSMENT: PAIN_FUNCTIONAL_ASSESSMENT: ACTIVITIES ARE NOT PREVENTED

## 2024-01-05 ASSESSMENT — PAIN SCALES - GENERAL: PAINLEVEL_OUTOF10: 8

## 2024-01-05 ASSESSMENT — PAIN DESCRIPTION - DESCRIPTORS: DESCRIPTORS: SHARP

## 2024-01-05 ASSESSMENT — PAIN DESCRIPTION - ORIENTATION: ORIENTATION: RIGHT

## 2024-01-05 ASSESSMENT — PAIN DESCRIPTION - LOCATION: LOCATION: LEG

## 2024-01-05 NOTE — SIGNIFICANT EVENT
Rapid respone called due to patient pulling IV out with blood pooling and saturating the sheets. Patient currently on a heparin gtt provoked DVT and was found to have pulled out IV. Heparin gtt stopped and vitals obtained which were stable. Morning labs ordered, will hold heparin gtt pending repeat H/H and anti-Xa      Aakash Murphy MD  Night Physician

## 2024-01-06 LAB
ANION GAP SERPL CALCULATED.3IONS-SCNC: 11 MMOL/L (ref 7–16)
ANTI-XA UNFRAC HEPARIN: 0.1 IU/ML (ref 0.3–0.7)
ANTI-XA UNFRAC HEPARIN: 0.6 IU/ML (ref 0.3–0.7)
ANTI-XA UNFRAC HEPARIN: 0.69 IU/ML (ref 0.3–0.7)
ANTI-XA UNFRAC HEPARIN: <0.1 IU/ML (ref 0.3–0.7)
ANTI-XA UNFRAC HEPARIN: <0.1 IU/ML (ref 0.3–0.7)
BASOPHILS ABSOLUTE: 0.1 K/CU MM
BASOPHILS RELATIVE PERCENT: 0.8 % (ref 0–1)
BUN SERPL-MCNC: 12 MG/DL (ref 6–23)
CALCIUM SERPL-MCNC: 7.5 MG/DL (ref 8.3–10.6)
CHLORIDE BLD-SCNC: 106 MMOL/L (ref 99–110)
CO2: 22 MMOL/L (ref 21–32)
CREAT SERPL-MCNC: 1.6 MG/DL (ref 0.9–1.3)
CRP SERPL HS-MCNC: 9.5 MG/L
DIFFERENTIAL TYPE: ABNORMAL
DOSE AMOUNT: NORMAL
DOSE TIME: NORMAL
EOSINOPHILS ABSOLUTE: 0.3 K/CU MM
EOSINOPHILS RELATIVE PERCENT: 3.3 % (ref 0–3)
GFR SERPL CREATININE-BSD FRML MDRD: 39 ML/MIN/1.73M2
GLUCOSE BLD-MCNC: 113 MG/DL (ref 70–99)
GLUCOSE SERPL-MCNC: 102 MG/DL (ref 70–99)
HCT VFR BLD CALC: 25 % (ref 42–52)
HEMOGLOBIN: 7.6 GM/DL (ref 13.5–18)
IMMATURE NEUTROPHIL %: 2.4 % (ref 0–0.43)
LYMPHOCYTES ABSOLUTE: 1.3 K/CU MM
LYMPHOCYTES RELATIVE PERCENT: 12.8 % (ref 24–44)
MCH RBC QN AUTO: 30.2 PG (ref 27–31)
MCHC RBC AUTO-ENTMCNC: 30.4 % (ref 32–36)
MCV RBC AUTO: 99.2 FL (ref 78–100)
MONOCYTES ABSOLUTE: 0.8 K/CU MM
MONOCYTES RELATIVE PERCENT: 7.8 % (ref 0–4)
NUCLEATED RBC %: 0 %
PDW BLD-RTO: 19.7 % (ref 11.7–14.9)
PLATELET # BLD: 244 K/CU MM (ref 140–440)
PMV BLD AUTO: 9.8 FL (ref 7.5–11.1)
POTASSIUM SERPL-SCNC: 4.4 MMOL/L (ref 3.5–5.1)
RBC # BLD: 2.52 M/CU MM (ref 4.6–6.2)
SEGMENTED NEUTROPHILS ABSOLUTE COUNT: 7.3 K/CU MM
SEGMENTED NEUTROPHILS RELATIVE PERCENT: 72.9 % (ref 36–66)
SODIUM BLD-SCNC: 139 MMOL/L (ref 135–145)
TOTAL IMMATURE NEUTOROPHIL: 0.24 K/CU MM
TOTAL NUCLEATED RBC: 0 K/CU MM
VANCOMYCIN RANDOM: 11 UG/ML
WBC # BLD: 10.1 K/CU MM (ref 4–10.5)

## 2024-01-06 PROCEDURE — 76937 US GUIDE VASCULAR ACCESS: CPT

## 2024-01-06 PROCEDURE — 85520 HEPARIN ASSAY: CPT

## 2024-01-06 PROCEDURE — 80202 ASSAY OF VANCOMYCIN: CPT

## 2024-01-06 PROCEDURE — 2500000003 HC RX 250 WO HCPCS: Performed by: SURGERY

## 2024-01-06 PROCEDURE — 36415 COLL VENOUS BLD VENIPUNCTURE: CPT

## 2024-01-06 PROCEDURE — 86140 C-REACTIVE PROTEIN: CPT

## 2024-01-06 PROCEDURE — 99024 POSTOP FOLLOW-UP VISIT: CPT | Performed by: NURSE PRACTITIONER

## 2024-01-06 PROCEDURE — 6370000000 HC RX 637 (ALT 250 FOR IP): Performed by: SURGERY

## 2024-01-06 PROCEDURE — APPNB15 APP NON BILLABLE TIME 0-15 MINS: Performed by: NURSE PRACTITIONER

## 2024-01-06 PROCEDURE — 94761 N-INVAS EAR/PLS OXIMETRY MLT: CPT

## 2024-01-06 PROCEDURE — 2580000003 HC RX 258: Performed by: NURSE PRACTITIONER

## 2024-01-06 PROCEDURE — 6360000002 HC RX W HCPCS: Performed by: NURSE PRACTITIONER

## 2024-01-06 PROCEDURE — 2140000000 HC CCU INTERMEDIATE R&B

## 2024-01-06 PROCEDURE — 85025 COMPLETE CBC W/AUTO DIFF WBC: CPT

## 2024-01-06 PROCEDURE — 82962 GLUCOSE BLOOD TEST: CPT

## 2024-01-06 PROCEDURE — 80048 BASIC METABOLIC PNL TOTAL CA: CPT

## 2024-01-06 RX ADMIN — ROPINIROLE HYDROCHLORIDE 0.75 MG: 0.25 TABLET, FILM COATED ORAL at 21:26

## 2024-01-06 RX ADMIN — METOPROLOL SUCCINATE 12.5 MG: 25 TABLET, EXTENDED RELEASE ORAL at 08:39

## 2024-01-06 RX ADMIN — HYDROCODONE BITARTRATE AND ACETAMINOPHEN 1 TABLET: 5; 325 TABLET ORAL at 08:41

## 2024-01-06 RX ADMIN — TRAZODONE HYDROCHLORIDE 50 MG: 50 TABLET ORAL at 21:26

## 2024-01-06 RX ADMIN — HEPARIN SODIUM 18 UNITS/KG/HR: 10000 INJECTION, SOLUTION INTRAVENOUS at 11:02

## 2024-01-06 RX ADMIN — VANCOMYCIN HYDROCHLORIDE 1000 MG: 1 INJECTION, POWDER, LYOPHILIZED, FOR SOLUTION INTRAVENOUS at 21:12

## 2024-01-06 RX ADMIN — EZETIMIBE 10 MG: 10 TABLET ORAL at 21:26

## 2024-01-06 ASSESSMENT — PAIN SCALES - GENERAL: PAINLEVEL_OUTOF10: 7

## 2024-01-06 ASSESSMENT — PAIN DESCRIPTION - DIRECTION: RADIATING_TOWARDS: KNEE

## 2024-01-06 ASSESSMENT — PAIN DESCRIPTION - FREQUENCY: FREQUENCY: CONTINUOUS

## 2024-01-06 ASSESSMENT — PAIN DESCRIPTION - PAIN TYPE: TYPE: SURGICAL PAIN

## 2024-01-06 ASSESSMENT — PAIN - FUNCTIONAL ASSESSMENT: PAIN_FUNCTIONAL_ASSESSMENT: PREVENTS OR INTERFERES SOME ACTIVE ACTIVITIES AND ADLS

## 2024-01-06 ASSESSMENT — PAIN DESCRIPTION - DESCRIPTORS: DESCRIPTORS: SHARP;ACHING

## 2024-01-06 ASSESSMENT — PAIN DESCRIPTION - ORIENTATION: ORIENTATION: RIGHT

## 2024-01-06 ASSESSMENT — PAIN DESCRIPTION - ONSET: ONSET: ON-GOING

## 2024-01-06 ASSESSMENT — PAIN DESCRIPTION - LOCATION: LOCATION: LEG

## 2024-01-06 ASSESSMENT — PAIN SCALES - WONG BAKER: WONGBAKER_NUMERICALRESPONSE: 2

## 2024-01-07 LAB
ANION GAP SERPL CALCULATED.3IONS-SCNC: 15 MMOL/L (ref 7–16)
ANTI-XA UNFRAC HEPARIN: 0.64 IU/ML (ref 0.3–0.7)
ANTI-XA UNFRAC HEPARIN: <0.1 IU/ML (ref 0.3–0.7)
BASOPHILS ABSOLUTE: 0.1 K/CU MM
BASOPHILS RELATIVE PERCENT: 1.3 % (ref 0–1)
BUN SERPL-MCNC: 12 MG/DL (ref 6–23)
CALCIUM SERPL-MCNC: 7.4 MG/DL (ref 8.3–10.6)
CHLORIDE BLD-SCNC: 106 MMOL/L (ref 99–110)
CO2: 22 MMOL/L (ref 21–32)
CREAT SERPL-MCNC: 1.4 MG/DL (ref 0.9–1.3)
DIFFERENTIAL TYPE: ABNORMAL
EOSINOPHILS ABSOLUTE: 0.4 K/CU MM
EOSINOPHILS RELATIVE PERCENT: 4.6 % (ref 0–3)
GFR SERPL CREATININE-BSD FRML MDRD: 46 ML/MIN/1.73M2
GLUCOSE SERPL-MCNC: 100 MG/DL (ref 70–99)
HCT VFR BLD CALC: 23 % (ref 42–52)
HCT VFR BLD CALC: 25.8 % (ref 42–52)
HEMOGLOBIN: 6.8 GM/DL (ref 13.5–18)
HEMOGLOBIN: 7.9 GM/DL (ref 13.5–18)
IMMATURE NEUTROPHIL %: 1.4 % (ref 0–0.43)
LYMPHOCYTES ABSOLUTE: 1.4 K/CU MM
LYMPHOCYTES RELATIVE PERCENT: 17 % (ref 24–44)
MCH RBC QN AUTO: 30.2 PG (ref 27–31)
MCHC RBC AUTO-ENTMCNC: 29.6 % (ref 32–36)
MCV RBC AUTO: 102.2 FL (ref 78–100)
MONOCYTES ABSOLUTE: 0.6 K/CU MM
MONOCYTES RELATIVE PERCENT: 7.1 % (ref 0–4)
NUCLEATED RBC %: 0 %
PDW BLD-RTO: 19.7 % (ref 11.7–14.9)
PLATELET # BLD: 230 K/CU MM (ref 140–440)
PMV BLD AUTO: 9.5 FL (ref 7.5–11.1)
POTASSIUM SERPL-SCNC: 4.4 MMOL/L (ref 3.5–5.1)
RBC # BLD: 2.25 M/CU MM (ref 4.6–6.2)
SEGMENTED NEUTROPHILS ABSOLUTE COUNT: 5.7 K/CU MM
SEGMENTED NEUTROPHILS RELATIVE PERCENT: 68.6 % (ref 36–66)
SODIUM BLD-SCNC: 143 MMOL/L (ref 135–145)
TOTAL IMMATURE NEUTOROPHIL: 0.12 K/CU MM
TOTAL NUCLEATED RBC: 0 K/CU MM
WBC # BLD: 8.3 K/CU MM (ref 4–10.5)

## 2024-01-07 PROCEDURE — 6370000000 HC RX 637 (ALT 250 FOR IP): Performed by: SURGERY

## 2024-01-07 PROCEDURE — 86901 BLOOD TYPING SEROLOGIC RH(D): CPT

## 2024-01-07 PROCEDURE — 2580000003 HC RX 258: Performed by: SURGERY

## 2024-01-07 PROCEDURE — 85014 HEMATOCRIT: CPT

## 2024-01-07 PROCEDURE — 86900 BLOOD TYPING SEROLOGIC ABO: CPT

## 2024-01-07 PROCEDURE — 36410 VNPNXR 3YR/> PHY/QHP DX/THER: CPT

## 2024-01-07 PROCEDURE — 94761 N-INVAS EAR/PLS OXIMETRY MLT: CPT

## 2024-01-07 PROCEDURE — 85025 COMPLETE CBC W/AUTO DIFF WBC: CPT

## 2024-01-07 PROCEDURE — 76937 US GUIDE VASCULAR ACCESS: CPT

## 2024-01-07 PROCEDURE — 80048 BASIC METABOLIC PNL TOTAL CA: CPT

## 2024-01-07 PROCEDURE — 36415 COLL VENOUS BLD VENIPUNCTURE: CPT

## 2024-01-07 PROCEDURE — 36430 TRANSFUSION BLD/BLD COMPNT: CPT

## 2024-01-07 PROCEDURE — 85520 HEPARIN ASSAY: CPT

## 2024-01-07 PROCEDURE — 2500000003 HC RX 250 WO HCPCS: Performed by: SURGERY

## 2024-01-07 PROCEDURE — P9016 RBC LEUKOCYTES REDUCED: HCPCS

## 2024-01-07 PROCEDURE — 85018 HEMOGLOBIN: CPT

## 2024-01-07 PROCEDURE — 86850 RBC ANTIBODY SCREEN: CPT

## 2024-01-07 PROCEDURE — 05HA33Z INSERTION OF INFUSION DEVICE INTO LEFT BRACHIAL VEIN, PERCUTANEOUS APPROACH: ICD-10-PCS | Performed by: STUDENT IN AN ORGANIZED HEALTH CARE EDUCATION/TRAINING PROGRAM

## 2024-01-07 PROCEDURE — 86922 COMPATIBILITY TEST ANTIGLOB: CPT

## 2024-01-07 PROCEDURE — 2140000000 HC CCU INTERMEDIATE R&B

## 2024-01-07 RX ORDER — SODIUM CHLORIDE 9 MG/ML
INJECTION, SOLUTION INTRAVENOUS PRN
Status: DISCONTINUED | OUTPATIENT
Start: 2024-01-07 | End: 2024-01-10 | Stop reason: HOSPADM

## 2024-01-07 RX ADMIN — ROPINIROLE HYDROCHLORIDE 0.75 MG: 0.25 TABLET, FILM COATED ORAL at 19:50

## 2024-01-07 RX ADMIN — EZETIMIBE 10 MG: 10 TABLET ORAL at 19:50

## 2024-01-07 RX ADMIN — SODIUM CHLORIDE, PRESERVATIVE FREE 10 ML: 5 INJECTION INTRAVENOUS at 19:50

## 2024-01-07 RX ADMIN — HEPARIN SODIUM 18 UNITS/KG/HR: 10000 INJECTION, SOLUTION INTRAVENOUS at 03:53

## 2024-01-07 RX ADMIN — METOPROLOL SUCCINATE 12.5 MG: 25 TABLET, EXTENDED RELEASE ORAL at 08:30

## 2024-01-07 RX ADMIN — TRAZODONE HYDROCHLORIDE 50 MG: 50 TABLET ORAL at 19:50

## 2024-01-07 ASSESSMENT — PAIN SCALES - GENERAL
PAINLEVEL_OUTOF10: 0
PAINLEVEL_OUTOF10: 0

## 2024-01-07 ASSESSMENT — PAIN SCALES - WONG BAKER
WONGBAKER_NUMERICALRESPONSE: 0
WONGBAKER_NUMERICALRESPONSE: 0

## 2024-01-07 NOTE — PLAN OF CARE
Problem: Discharge Planning  Goal: Discharge to home or other facility with appropriate resources  1/7/2024 0835 by Sammi Hi RN  Outcome: Progressing  1/7/2024 0445 by Amy Stovall RN  Outcome: Progressing     Problem: Safety - Adult  Goal: Free from fall injury  1/7/2024 0835 by Sammi Hi RN  Outcome: Progressing  1/7/2024 0445 by Amy Stovall RN  Outcome: Progressing     Problem: ABCDS Injury Assessment  Goal: Absence of physical injury  1/7/2024 0835 by Sammi Hi RN  Outcome: Progressing  1/7/2024 0445 by Amy Stovall RN  Outcome: Progressing     Problem: Skin/Tissue Integrity  Goal: Absence of new skin breakdown  Description: 1.  Monitor for areas of redness and/or skin breakdown  2.  Assess vascular access sites hourly  3.  Every 4-6 hours minimum:  Change oxygen saturation probe site  4.  Every 4-6 hours:  If on nasal continuous positive airway pressure, respiratory therapy assess nares and determine need for appliance change or resting period.  1/7/2024 0835 by Sammi Hi RN  Outcome: Progressing  1/7/2024 0445 by Amy Stovall RN  Outcome: Progressing     Problem: Pain  Goal: Verbalizes/displays adequate comfort level or baseline comfort level  1/7/2024 0835 by Sammi Hi RN  Outcome: Progressing  1/7/2024 0445 by Amy Stovall RN  Outcome: Progressing     Problem: Nutrition Deficit:  Goal: Optimize nutritional status  1/7/2024 0835 by Sammi Hi RN  Outcome: Progressing  1/7/2024 0445 by Amy Stovall RN  Outcome: Progressing

## 2024-01-08 PROBLEM — D62 ACUTE BLOOD LOSS ANEMIA: Status: ACTIVE | Noted: 2024-01-08

## 2024-01-08 PROBLEM — I82.401 ACUTE DEEP VEIN THROMBOSIS (DVT) OF RIGHT LOWER EXTREMITY (HCC): Status: ACTIVE | Noted: 2024-01-08

## 2024-01-08 LAB
ABO/RH: NORMAL
ANION GAP SERPL CALCULATED.3IONS-SCNC: 10 MMOL/L (ref 7–16)
ANTI-XA UNFRAC HEPARIN: 0.35 IU/ML (ref 0.3–0.7)
ANTI-XA UNFRAC HEPARIN: <0.1 IU/ML (ref 0.3–0.7)
ANTIBODY SCREEN: NEGATIVE
BASOPHILS ABSOLUTE: 0.1 K/CU MM
BASOPHILS RELATIVE PERCENT: 1.2 % (ref 0–1)
BUN SERPL-MCNC: 10 MG/DL (ref 6–23)
CALCIUM SERPL-MCNC: 7.5 MG/DL (ref 8.3–10.6)
CHLORIDE BLD-SCNC: 105 MMOL/L (ref 99–110)
CO2: 23 MMOL/L (ref 21–32)
COMPONENT: NORMAL
CREAT SERPL-MCNC: 1.5 MG/DL (ref 0.9–1.3)
CROSSMATCH RESULT: NORMAL
DIFFERENTIAL TYPE: ABNORMAL
DOSE AMOUNT: NORMAL
DOSE TIME: NORMAL
EOSINOPHILS ABSOLUTE: 0.3 K/CU MM
EOSINOPHILS RELATIVE PERCENT: 4.4 % (ref 0–3)
FERRITIN: 340 NG/ML (ref 30–400)
FOLATE SERPL-MCNC: 18.2 NG/ML (ref 3.1–17.5)
GFR SERPL CREATININE-BSD FRML MDRD: 42 ML/MIN/1.73M2
GLUCOSE SERPL-MCNC: 88 MG/DL (ref 70–99)
HCT VFR BLD CALC: 27.6 % (ref 42–52)
HEMOGLOBIN: 8.4 GM/DL (ref 13.5–18)
IMMATURE NEUTROPHIL %: 1.6 % (ref 0–0.43)
IRON: 51 UG/DL (ref 59–158)
LYMPHOCYTES ABSOLUTE: 1.3 K/CU MM
LYMPHOCYTES RELATIVE PERCENT: 17.8 % (ref 24–44)
MCH RBC QN AUTO: 29.3 PG (ref 27–31)
MCHC RBC AUTO-ENTMCNC: 30.4 % (ref 32–36)
MCV RBC AUTO: 96.2 FL (ref 78–100)
MONOCYTES ABSOLUTE: 0.6 K/CU MM
MONOCYTES RELATIVE PERCENT: 8.3 % (ref 0–4)
NUCLEATED RBC %: 0 %
PCT TRANSFERRIN: 28 % (ref 10–44)
PDW BLD-RTO: 21.7 % (ref 11.7–14.9)
PLATELET # BLD: 231 K/CU MM (ref 140–440)
PMV BLD AUTO: 9.5 FL (ref 7.5–11.1)
POTASSIUM SERPL-SCNC: 4.4 MMOL/L (ref 3.5–5.1)
RBC # BLD: 2.87 M/CU MM (ref 4.6–6.2)
SEGMENTED NEUTROPHILS ABSOLUTE COUNT: 5 K/CU MM
SEGMENTED NEUTROPHILS RELATIVE PERCENT: 66.7 % (ref 36–66)
SODIUM BLD-SCNC: 138 MMOL/L (ref 135–145)
STATUS: NORMAL
TOTAL IMMATURE NEUTOROPHIL: 0.12 K/CU MM
TOTAL IRON BINDING CAPACITY: 183 UG/DL (ref 250–450)
TOTAL NUCLEATED RBC: 0 K/CU MM
TRANSFUSION STATUS: NORMAL
UNIT DIVISION: 0
UNIT NUMBER: NORMAL
UNSATURATED IRON BINDING CAPACITY: 132 UG/DL (ref 110–370)
VANCOMYCIN RANDOM: 12.8 UG/ML
VITAMIN B-12: 1393 PG/ML (ref 211–911)
WBC # BLD: 7.5 K/CU MM (ref 4–10.5)

## 2024-01-08 PROCEDURE — 6370000000 HC RX 637 (ALT 250 FOR IP): Performed by: SURGERY

## 2024-01-08 PROCEDURE — 85025 COMPLETE CBC W/AUTO DIFF WBC: CPT

## 2024-01-08 PROCEDURE — 99222 1ST HOSP IP/OBS MODERATE 55: CPT | Performed by: INTERNAL MEDICINE

## 2024-01-08 PROCEDURE — 83540 ASSAY OF IRON: CPT

## 2024-01-08 PROCEDURE — 82746 ASSAY OF FOLIC ACID SERUM: CPT

## 2024-01-08 PROCEDURE — 97112 NEUROMUSCULAR REEDUCATION: CPT

## 2024-01-08 PROCEDURE — 99232 SBSQ HOSP IP/OBS MODERATE 35: CPT | Performed by: SURGERY

## 2024-01-08 PROCEDURE — 2580000003 HC RX 258: Performed by: SURGERY

## 2024-01-08 PROCEDURE — 83550 IRON BINDING TEST: CPT

## 2024-01-08 PROCEDURE — 80202 ASSAY OF VANCOMYCIN: CPT

## 2024-01-08 PROCEDURE — 97606 NEG PRS WND THER DME>50 SQCM: CPT

## 2024-01-08 PROCEDURE — 2140000000 HC CCU INTERMEDIATE R&B

## 2024-01-08 PROCEDURE — APPNB60 APP NON BILLABLE TIME 46-60 MINS: Performed by: PHYSICIAN ASSISTANT

## 2024-01-08 PROCEDURE — 36415 COLL VENOUS BLD VENIPUNCTURE: CPT

## 2024-01-08 PROCEDURE — 82728 ASSAY OF FERRITIN: CPT

## 2024-01-08 PROCEDURE — 2500000003 HC RX 250 WO HCPCS: Performed by: SURGERY

## 2024-01-08 PROCEDURE — 80048 BASIC METABOLIC PNL TOTAL CA: CPT

## 2024-01-08 PROCEDURE — 99233 SBSQ HOSP IP/OBS HIGH 50: CPT | Performed by: NURSE PRACTITIONER

## 2024-01-08 PROCEDURE — 82607 VITAMIN B-12: CPT

## 2024-01-08 PROCEDURE — 6370000000 HC RX 637 (ALT 250 FOR IP): Performed by: NURSE PRACTITIONER

## 2024-01-08 PROCEDURE — 85520 HEPARIN ASSAY: CPT

## 2024-01-08 PROCEDURE — 94761 N-INVAS EAR/PLS OXIMETRY MLT: CPT

## 2024-01-08 PROCEDURE — 97530 THERAPEUTIC ACTIVITIES: CPT

## 2024-01-08 RX ORDER — LINEZOLID 600 MG/1
600 TABLET, FILM COATED ORAL EVERY 12 HOURS SCHEDULED
Status: DISCONTINUED | OUTPATIENT
Start: 2024-01-08 | End: 2024-01-10 | Stop reason: HOSPADM

## 2024-01-08 RX ADMIN — SODIUM CHLORIDE, PRESERVATIVE FREE 10 ML: 5 INJECTION INTRAVENOUS at 22:08

## 2024-01-08 RX ADMIN — LINEZOLID 600 MG: 600 TABLET, FILM COATED ORAL at 22:08

## 2024-01-08 RX ADMIN — HYDROCODONE BITARTRATE AND ACETAMINOPHEN 1 TABLET: 5; 325 TABLET ORAL at 16:38

## 2024-01-08 RX ADMIN — EZETIMIBE 10 MG: 10 TABLET ORAL at 22:08

## 2024-01-08 RX ADMIN — METOPROLOL SUCCINATE 12.5 MG: 25 TABLET, EXTENDED RELEASE ORAL at 09:11

## 2024-01-08 RX ADMIN — LINEZOLID 600 MG: 600 TABLET, FILM COATED ORAL at 11:11

## 2024-01-08 RX ADMIN — SODIUM CHLORIDE, PRESERVATIVE FREE 10 ML: 5 INJECTION INTRAVENOUS at 09:12

## 2024-01-08 RX ADMIN — ROPINIROLE HYDROCHLORIDE 0.75 MG: 0.25 TABLET, FILM COATED ORAL at 22:08

## 2024-01-08 RX ADMIN — HEPARIN SODIUM 18 UNITS/KG/HR: 10000 INJECTION, SOLUTION INTRAVENOUS at 14:35

## 2024-01-08 ASSESSMENT — PAIN DESCRIPTION - PAIN TYPE
TYPE: SURGICAL PAIN
TYPE: SURGICAL PAIN
TYPE: ACUTE PAIN;CHRONIC PAIN

## 2024-01-08 ASSESSMENT — PAIN SCALES - GENERAL
PAINLEVEL_OUTOF10: 4
PAINLEVEL_OUTOF10: 3
PAINLEVEL_OUTOF10: 8
PAINLEVEL_OUTOF10: 0
PAINLEVEL_OUTOF10: 0

## 2024-01-08 ASSESSMENT — PAIN SCALES - WONG BAKER
WONGBAKER_NUMERICALRESPONSE: 2
WONGBAKER_NUMERICALRESPONSE: 2

## 2024-01-08 ASSESSMENT — PAIN DESCRIPTION - ORIENTATION
ORIENTATION: RIGHT;LEFT
ORIENTATION: RIGHT;LOWER
ORIENTATION: RIGHT;LOWER

## 2024-01-08 ASSESSMENT — PAIN DESCRIPTION - DESCRIPTORS
DESCRIPTORS: ACHING
DESCRIPTORS: ACHING
DESCRIPTORS: ACHING;THROBBING

## 2024-01-08 ASSESSMENT — PAIN DESCRIPTION - DIRECTION
RADIATING_TOWARDS: KNEE
RADIATING_TOWARDS: KNEE

## 2024-01-08 ASSESSMENT — PAIN DESCRIPTION - ONSET
ONSET: ON-GOING
ONSET: ON-GOING

## 2024-01-08 ASSESSMENT — PAIN - FUNCTIONAL ASSESSMENT
PAIN_FUNCTIONAL_ASSESSMENT: PREVENTS OR INTERFERES SOME ACTIVE ACTIVITIES AND ADLS
PAIN_FUNCTIONAL_ASSESSMENT: ACTIVITIES ARE NOT PREVENTED
PAIN_FUNCTIONAL_ASSESSMENT: PREVENTS OR INTERFERES SOME ACTIVE ACTIVITIES AND ADLS

## 2024-01-08 ASSESSMENT — PAIN DESCRIPTION - FREQUENCY
FREQUENCY: CONTINUOUS
FREQUENCY: CONTINUOUS

## 2024-01-08 ASSESSMENT — PAIN DESCRIPTION - LOCATION
LOCATION: LEG

## 2024-01-08 NOTE — PLAN OF CARE
Problem: Discharge Planning  Goal: Discharge to home or other facility with appropriate resources  Outcome: Progressing  Flowsheets (Taken 1/8/2024 0941)  Discharge to home or other facility with appropriate resources:   Identify barriers to discharge with patient and caregiver   Arrange for needed discharge resources and transportation as appropriate   Identify discharge learning needs (meds, wound care, etc)   Arrange for interpreters to assist at discharge as needed   Refer to discharge planning if patient needs post-hospital services based on physician order or complex needs related to functional status, cognitive ability or social support system     Problem: Safety - Adult  Goal: Free from fall injury  Outcome: Progressing     Problem: ABCDS Injury Assessment  Goal: Absence of physical injury  Outcome: Progressing     Problem: Skin/Tissue Integrity  Goal: Absence of new skin breakdown  Description: 1.  Monitor for areas of redness and/or skin breakdown  2.  Assess vascular access sites hourly  3.  Every 4-6 hours minimum:  Change oxygen saturation probe site  4.  Every 4-6 hours:  If on nasal continuous positive airway pressure, respiratory therapy assess nares and determine need for appliance change or resting period.  Outcome: Progressing     Problem: Pain  Goal: Verbalizes/displays adequate comfort level or baseline comfort level  Outcome: Progressing     Problem: Nutrition Deficit:  Goal: Optimize nutritional status  Outcome: Progressing

## 2024-01-08 NOTE — PLAN OF CARE
Problem: Discharge Planning  Goal: Discharge to home or other facility with appropriate resources  1/7/2024 2144 by Kandis Conti RN  Outcome: Progressing  1/7/2024 0835 by Sammi Hi RN  Outcome: Progressing     Problem: Safety - Adult  Goal: Free from fall injury  1/7/2024 2144 by Kandis Conti RN  Outcome: Progressing  1/7/2024 0835 by Sammi Hi RN  Outcome: Progressing     Problem: ABCDS Injury Assessment  Goal: Absence of physical injury  1/7/2024 2144 by Kandis Conti RN  Outcome: Progressing  1/7/2024 0835 by Sammi Hi RN  Outcome: Progressing     Problem: Skin/Tissue Integrity  Goal: Absence of new skin breakdown  Description: 1.  Monitor for areas of redness and/or skin breakdown  2.  Assess vascular access sites hourly  3.  Every 4-6 hours minimum:  Change oxygen saturation probe site  4.  Every 4-6 hours:  If on nasal continuous positive airway pressure, respiratory therapy assess nares and determine need for appliance change or resting period.  1/7/2024 2144 by Kandis Conti RN  Outcome: Progressing  1/7/2024 0835 by Sammi Hi RN  Outcome: Progressing     Problem: Pain  Goal: Verbalizes/displays adequate comfort level or baseline comfort level  1/7/2024 2144 by Kandis Conti RN  Outcome: Progressing  1/7/2024 0835 by Sammi Hi RN  Outcome: Progressing     Problem: Nutrition Deficit:  Goal: Optimize nutritional status  1/7/2024 2144 by Kandis Conti RN  Outcome: Progressing  1/7/2024 0835 by Sammi Hi RN  Outcome: Progressing

## 2024-01-08 NOTE — CARE COORDINATION
CM in to see Pt to follow up on discharge planning.  Plan remains the [facility].    Pt denies any needs at this time.  Precert remains pending.      CM following

## 2024-01-08 NOTE — CARE COORDINATION
Per [facility] portal precert remains pending at this time 12:22 PM 2:39 PM 3:26 PM 4:25 PM   Message receive:   Message  RECEIVED CLINICAL INFORMATION NOW PENDING CLINICAL REVIEW DECISION WILL BE MADE WITHIN 14 DAYS OF THE RECEIPT OF THE REQUEST  Submitted 1/3/2024    Updates submitted via portal     Electronic PAS completed

## 2024-01-09 PROBLEM — E44.0 MODERATE MALNUTRITION (HCC): Status: ACTIVE | Noted: 2024-01-09

## 2024-01-09 LAB
ANION GAP SERPL CALCULATED.3IONS-SCNC: 12 MMOL/L (ref 7–16)
ANTI-XA UNFRAC HEPARIN: 0.69 IU/ML (ref 0.3–0.7)
BASOPHILS ABSOLUTE: 0.1 K/CU MM
BASOPHILS RELATIVE PERCENT: 1.4 % (ref 0–1)
BUN SERPL-MCNC: 10 MG/DL (ref 6–23)
CALCIUM SERPL-MCNC: 7.9 MG/DL (ref 8.3–10.6)
CHLORIDE BLD-SCNC: 104 MMOL/L (ref 99–110)
CO2: 21 MMOL/L (ref 21–32)
CREAT SERPL-MCNC: 1.5 MG/DL (ref 0.9–1.3)
DIFFERENTIAL TYPE: ABNORMAL
EOSINOPHILS ABSOLUTE: 0.2 K/CU MM
EOSINOPHILS RELATIVE PERCENT: 3.1 % (ref 0–3)
GFR SERPL CREATININE-BSD FRML MDRD: 42 ML/MIN/1.73M2
GLUCOSE SERPL-MCNC: 103 MG/DL (ref 70–99)
HCT VFR BLD CALC: 28.6 % (ref 42–52)
HEMOGLOBIN: 8.3 GM/DL (ref 13.5–18)
IMMATURE NEUTROPHIL %: 0.9 % (ref 0–0.43)
LYMPHOCYTES ABSOLUTE: 1.4 K/CU MM
LYMPHOCYTES RELATIVE PERCENT: 18.5 % (ref 24–44)
MCH RBC QN AUTO: 29.2 PG (ref 27–31)
MCHC RBC AUTO-ENTMCNC: 29 % (ref 32–36)
MCV RBC AUTO: 100.7 FL (ref 78–100)
MONOCYTES ABSOLUTE: 0.6 K/CU MM
MONOCYTES RELATIVE PERCENT: 8.3 % (ref 0–4)
NUCLEATED RBC %: 0 %
PDW BLD-RTO: 21 % (ref 11.7–14.9)
PLATELET # BLD: 227 K/CU MM (ref 140–440)
PMV BLD AUTO: 9.8 FL (ref 7.5–11.1)
POTASSIUM SERPL-SCNC: 4.5 MMOL/L (ref 3.5–5.1)
RBC # BLD: 2.84 M/CU MM (ref 4.6–6.2)
SEGMENTED NEUTROPHILS ABSOLUTE COUNT: 5 K/CU MM
SEGMENTED NEUTROPHILS RELATIVE PERCENT: 67.8 % (ref 36–66)
SODIUM BLD-SCNC: 137 MMOL/L (ref 135–145)
TOTAL IMMATURE NEUTOROPHIL: 0.07 K/CU MM
TOTAL NUCLEATED RBC: 0 K/CU MM
WBC # BLD: 7.4 K/CU MM (ref 4–10.5)

## 2024-01-09 PROCEDURE — 6370000000 HC RX 637 (ALT 250 FOR IP): Performed by: SURGERY

## 2024-01-09 PROCEDURE — 99232 SBSQ HOSP IP/OBS MODERATE 35: CPT | Performed by: NURSE PRACTITIONER

## 2024-01-09 PROCEDURE — 6370000000 HC RX 637 (ALT 250 FOR IP): Performed by: NURSE PRACTITIONER

## 2024-01-09 PROCEDURE — 2140000000 HC CCU INTERMEDIATE R&B

## 2024-01-09 PROCEDURE — 94761 N-INVAS EAR/PLS OXIMETRY MLT: CPT

## 2024-01-09 PROCEDURE — 36415 COLL VENOUS BLD VENIPUNCTURE: CPT

## 2024-01-09 PROCEDURE — 85025 COMPLETE CBC W/AUTO DIFF WBC: CPT

## 2024-01-09 PROCEDURE — 80048 BASIC METABOLIC PNL TOTAL CA: CPT

## 2024-01-09 PROCEDURE — 2500000003 HC RX 250 WO HCPCS: Performed by: SURGERY

## 2024-01-09 PROCEDURE — 85520 HEPARIN ASSAY: CPT

## 2024-01-09 RX ADMIN — LINEZOLID 600 MG: 600 TABLET, FILM COATED ORAL at 20:21

## 2024-01-09 RX ADMIN — EZETIMIBE 10 MG: 10 TABLET ORAL at 20:20

## 2024-01-09 RX ADMIN — LINEZOLID 600 MG: 600 TABLET, FILM COATED ORAL at 09:19

## 2024-01-09 RX ADMIN — HEPARIN SODIUM 18 UNITS/KG/HR: 10000 INJECTION, SOLUTION INTRAVENOUS at 09:22

## 2024-01-09 RX ADMIN — ROPINIROLE HYDROCHLORIDE 0.75 MG: 0.25 TABLET, FILM COATED ORAL at 20:21

## 2024-01-09 RX ADMIN — TRAZODONE HYDROCHLORIDE 50 MG: 50 TABLET ORAL at 20:21

## 2024-01-09 RX ADMIN — METOPROLOL SUCCINATE 12.5 MG: 25 TABLET, EXTENDED RELEASE ORAL at 09:20

## 2024-01-09 ASSESSMENT — PAIN SCALES - WONG BAKER
WONGBAKER_NUMERICALRESPONSE: 2

## 2024-01-09 ASSESSMENT — PAIN SCALES - GENERAL
PAINLEVEL_OUTOF10: 0
PAINLEVEL_OUTOF10: 0

## 2024-01-09 NOTE — PLAN OF CARE
Problem: Discharge Planning  Goal: Discharge to home or other facility with appropriate resources  1/9/2024 0950 by Negar Palomo RN  Outcome: Progressing  1/9/2024 0950 by Negar Palomo RN  Outcome: Progressing  Flowsheets (Taken 1/9/2024 0914)  Discharge to home or other facility with appropriate resources:   Arrange for needed discharge resources and transportation as appropriate   Identify barriers to discharge with patient and caregiver   Identify discharge learning needs (meds, wound care, etc)   Arrange for interpreters to assist at discharge as needed   Refer to discharge planning if patient needs post-hospital services based on physician order or complex needs related to functional status, cognitive ability or social support system  1/8/2024 2228 by Mary Broderick RN  Outcome: Progressing     Problem: Safety - Adult  Goal: Free from fall injury  1/9/2024 0950 by Negar Palomo RN  Outcome: Progressing  1/9/2024 0950 by Negar Palomo RN  Outcome: Progressing  1/8/2024 2228 by Mary Broderick RN  Outcome: Progressing     Problem: ABCDS Injury Assessment  Goal: Absence of physical injury  1/9/2024 0950 by Negar Palomo RN  Outcome: Progressing  1/9/2024 0950 by Negar Palomo RN  Outcome: Progressing  1/8/2024 2228 by Mary Broderick RN  Outcome: Progressing     Problem: Skin/Tissue Integrity  Goal: Absence of new skin breakdown  Description: 1.  Monitor for areas of redness and/or skin breakdown  2.  Assess vascular access sites hourly  3.  Every 4-6 hours minimum:  Change oxygen saturation probe site  4.  Every 4-6 hours:  If on nasal continuous positive airway pressure, respiratory therapy assess nares and determine need for appliance change or resting period.  1/9/2024 0950 by Negar Palomo RN  Outcome: Progressing  1/9/2024 0950 by Negar Palomo RN  Outcome: Progressing  1/8/2024 2228 by Mary Broderick RN  Outcome: Progressing     Problem: Pain  Goal:

## 2024-01-09 NOTE — CARE COORDINATION
Pre-cert pending for [facility].  [facility]RIS/Teri to notify CM when she receives the pre-cert. PT/OT updated yesterday.  Rapid covid required on day of d/c.  D/c instructions are on front of packet located with the soft chart.  TE

## 2024-01-09 NOTE — CARE COORDINATION
TC to [facility] [phone] to check status of precert request that was initiated on 1/3/2024 .  I was advised that clinical has been received and will be reviewed \"soon\" and they have 14 days to respond to request.    Precert remains pending at this time 9:13 AM; 11:38 AM; 1:03 PM; 2:41 PM     Updated therapy and clinical added to the portal

## 2024-01-10 VITALS
HEIGHT: 69 IN | TEMPERATURE: 97.8 F | DIASTOLIC BLOOD PRESSURE: 55 MMHG | OXYGEN SATURATION: 96 % | HEART RATE: 72 BPM | SYSTOLIC BLOOD PRESSURE: 141 MMHG | WEIGHT: 163.14 LBS | BODY MASS INDEX: 24.16 KG/M2 | RESPIRATION RATE: 14 BRPM

## 2024-01-10 PROBLEM — L02.415 ABSCESS OF RIGHT LEG: Status: RESOLVED | Noted: 2023-12-28 | Resolved: 2024-01-10

## 2024-01-10 LAB
ANION GAP SERPL CALCULATED.3IONS-SCNC: 7 MMOL/L (ref 7–16)
ANTI-XA UNFRAC HEPARIN: 0.8 IU/ML (ref 0.3–0.7)
ANTI-XA UNFRAC HEPARIN: 1.02 IU/ML (ref 0.3–0.7)
BASOPHILS ABSOLUTE: 0.1 K/CU MM
BASOPHILS RELATIVE PERCENT: 1.4 % (ref 0–1)
BUN SERPL-MCNC: 8 MG/DL (ref 6–23)
CALCIUM SERPL-MCNC: 7.6 MG/DL (ref 8.3–10.6)
CHLORIDE BLD-SCNC: 106 MMOL/L (ref 99–110)
CO2: 23 MMOL/L (ref 21–32)
CREAT SERPL-MCNC: 1.6 MG/DL (ref 0.9–1.3)
DIFFERENTIAL TYPE: ABNORMAL
EOSINOPHILS ABSOLUTE: 0.3 K/CU MM
EOSINOPHILS RELATIVE PERCENT: 5.3 % (ref 0–3)
GFR SERPL CREATININE-BSD FRML MDRD: 39 ML/MIN/1.73M2
GLUCOSE BLD-MCNC: 103 MG/DL (ref 70–99)
GLUCOSE SERPL-MCNC: 98 MG/DL (ref 70–99)
HCT VFR BLD CALC: 26.4 % (ref 42–52)
HEMOGLOBIN: 8.1 GM/DL (ref 13.5–18)
IMMATURE NEUTROPHIL %: 0.9 % (ref 0–0.43)
LYMPHOCYTES ABSOLUTE: 1.3 K/CU MM
LYMPHOCYTES RELATIVE PERCENT: 19.5 % (ref 24–44)
MCH RBC QN AUTO: 29.2 PG (ref 27–31)
MCHC RBC AUTO-ENTMCNC: 30.7 % (ref 32–36)
MCV RBC AUTO: 95.3 FL (ref 78–100)
MONOCYTES ABSOLUTE: 0.6 K/CU MM
MONOCYTES RELATIVE PERCENT: 9.1 % (ref 0–4)
NUCLEATED RBC %: 0 %
PDW BLD-RTO: 20.4 % (ref 11.7–14.9)
PLATELET # BLD: 216 K/CU MM (ref 140–440)
PMV BLD AUTO: 9.3 FL (ref 7.5–11.1)
POTASSIUM SERPL-SCNC: 4.3 MMOL/L (ref 3.5–5.1)
RBC # BLD: 2.77 M/CU MM (ref 4.6–6.2)
SARS-COV-2 RDRP RESP QL NAA+PROBE: NOT DETECTED
SEGMENTED NEUTROPHILS ABSOLUTE COUNT: 4.1 K/CU MM
SEGMENTED NEUTROPHILS RELATIVE PERCENT: 63.8 % (ref 36–66)
SODIUM BLD-SCNC: 136 MMOL/L (ref 135–145)
SOURCE: NORMAL
TOTAL IMMATURE NEUTOROPHIL: 0.06 K/CU MM
TOTAL NUCLEATED RBC: 0 K/CU MM
WBC # BLD: 6.5 K/CU MM (ref 4–10.5)

## 2024-01-10 PROCEDURE — 2500000003 HC RX 250 WO HCPCS: Performed by: SURGERY

## 2024-01-10 PROCEDURE — 36415 COLL VENOUS BLD VENIPUNCTURE: CPT

## 2024-01-10 PROCEDURE — 87635 SARS-COV-2 COVID-19 AMP PRB: CPT

## 2024-01-10 PROCEDURE — 85520 HEPARIN ASSAY: CPT

## 2024-01-10 PROCEDURE — 94761 N-INVAS EAR/PLS OXIMETRY MLT: CPT

## 2024-01-10 PROCEDURE — 99211 OFF/OP EST MAY X REQ PHY/QHP: CPT

## 2024-01-10 PROCEDURE — 6370000000 HC RX 637 (ALT 250 FOR IP): Performed by: SURGERY

## 2024-01-10 PROCEDURE — 80048 BASIC METABOLIC PNL TOTAL CA: CPT

## 2024-01-10 PROCEDURE — 97530 THERAPEUTIC ACTIVITIES: CPT

## 2024-01-10 PROCEDURE — 6370000000 HC RX 637 (ALT 250 FOR IP): Performed by: NURSE PRACTITIONER

## 2024-01-10 PROCEDURE — 82962 GLUCOSE BLOOD TEST: CPT

## 2024-01-10 PROCEDURE — 97112 NEUROMUSCULAR REEDUCATION: CPT

## 2024-01-10 PROCEDURE — 85025 COMPLETE CBC W/AUTO DIFF WBC: CPT

## 2024-01-10 RX ORDER — LINEZOLID 600 MG/1
600 TABLET, FILM COATED ORAL EVERY 12 HOURS SCHEDULED
Qty: 9 TABLET | Refills: 0 | Status: SHIPPED | OUTPATIENT
Start: 2024-01-10 | End: 2024-01-15

## 2024-01-10 RX ORDER — OXYCODONE HYDROCHLORIDE AND ACETAMINOPHEN 5; 325 MG/1; MG/1
1 TABLET ORAL EVERY 8 HOURS PRN
Qty: 9 TABLET | Refills: 0 | Status: SHIPPED | OUTPATIENT
Start: 2024-01-10 | End: 2024-01-13

## 2024-01-10 RX ADMIN — METOPROLOL SUCCINATE 12.5 MG: 25 TABLET, EXTENDED RELEASE ORAL at 09:06

## 2024-01-10 RX ADMIN — HEPARIN SODIUM 18 UNITS/KG/HR: 10000 INJECTION, SOLUTION INTRAVENOUS at 04:23

## 2024-01-10 RX ADMIN — HYDROCODONE BITARTRATE AND ACETAMINOPHEN 1 TABLET: 5; 325 TABLET ORAL at 04:25

## 2024-01-10 RX ADMIN — LINEZOLID 600 MG: 600 TABLET, FILM COATED ORAL at 09:06

## 2024-01-10 RX ADMIN — HYDROCODONE BITARTRATE AND ACETAMINOPHEN 1 TABLET: 5; 325 TABLET ORAL at 09:26

## 2024-01-10 ASSESSMENT — PAIN DESCRIPTION - ORIENTATION
ORIENTATION: RIGHT;LOWER
ORIENTATION: RIGHT
ORIENTATION: RIGHT;LOWER

## 2024-01-10 ASSESSMENT — PAIN DESCRIPTION - PAIN TYPE
TYPE: SURGICAL PAIN
TYPE: SURGICAL PAIN

## 2024-01-10 ASSESSMENT — PAIN DESCRIPTION - LOCATION
LOCATION: LEG
LOCATION: ANKLE;LEG
LOCATION: ANKLE;LEG

## 2024-01-10 ASSESSMENT — PAIN DESCRIPTION - DESCRIPTORS
DESCRIPTORS: ACHING

## 2024-01-10 ASSESSMENT — PAIN - FUNCTIONAL ASSESSMENT
PAIN_FUNCTIONAL_ASSESSMENT: PREVENTS OR INTERFERES SOME ACTIVE ACTIVITIES AND ADLS

## 2024-01-10 ASSESSMENT — PAIN DESCRIPTION - ONSET
ONSET: ON-GOING
ONSET: ON-GOING

## 2024-01-10 ASSESSMENT — PAIN SCALES - GENERAL
PAINLEVEL_OUTOF10: 5
PAINLEVEL_OUTOF10: 3
PAINLEVEL_OUTOF10: 7
PAINLEVEL_OUTOF10: 8

## 2024-01-10 ASSESSMENT — PAIN DESCRIPTION - DIRECTION
RADIATING_TOWARDS: KNEE
RADIATING_TOWARDS: KNEE

## 2024-01-10 ASSESSMENT — PAIN DESCRIPTION - FREQUENCY
FREQUENCY: CONTINUOUS
FREQUENCY: CONTINUOUS

## 2024-01-10 NOTE — CARE COORDINATION
Pre-cert still pending for [facility].  Requested updated PT/OT notes via wb.  INGRIS/Teri to notify CM when pre-cert received.  TE    Rapid covid required on day of d/c.  D/c instructions are on front of packet located with the soft chart.

## 2024-01-10 NOTE — CARE COORDINATION
Pre-cert received today for [facility] and is Good through 01/16 per [facility]/Teri. Updated pt.  Notified Dr Loaiza via PS.  Rapid covid needed on day of d/c.  D/C INSTRUCTIONS ARE ON FRONT OF PACKET LOCATED WITH THE SOFT CHART.  TE    .Pt returning/new to [facility] at discharge. Please call report to [phone] and fax orders, AVS, and discharge summaries to [phone].

## 2024-01-10 NOTE — CARE COORDINATION
Pt's wife has requested that daughter transport pt to [facility] d/t she does not want another transport bill.  CM called pt's daughter/Elizabeth and she is agreeable to  pt today at 1630 and transport him to [facility].  CM notified pt's nurse and [facility].  TE

## 2024-01-10 NOTE — CARE COORDINATION
Received precert for [facility]:   Admission Service Details  Certification Number  [id number]  Status  CERTIFIED IN TOTAL    Service Type Code 1  AG - Skilled Nursing Care    Start Date - End Date  2024-01-10 - 2024-01-16

## 2024-01-10 NOTE — PROGRESS NOTES
V2.0    [facility] Progress Note      Name:  Westley Rios /Age/Sex: 1926  (89 y.o. male)   MRN & CSN:  [id number] & [id number] Encounter Date/Time: 2023 9:42 AM EST   Location:  [facility] PCP: Jonathan Snow MD     Attending:Carlos Snow MD       Hospital Day: 5    Assessment and Recommendations   Westley Rios is a 89 y.o. male who presents with Abscess of right leg      Plan:     Sepsis 2/2 RLE abscess   -Patient presents with acute onset RLE pain  -On admit patient hemodynamically stable, afebrile, leukocytosis of 23, increased inflammatory markers   -CT of right tibula showed fluid collection in the subcutaneous tissues overlying the  posterior musculature running from the level of the knee down the calf to the  mid to distal portion.  Skin thickening and there are a few small foci of gas  -General surgery was consulted as per general surgery low suspicion for necrotizing fasciitis.  -Underwent I&D in the OR on .  Surgical cultures now growing Staph aureus and strep pyogenes.  Awaiting sensitivities continue Vanco alone and clindamycin and cefepime have been stopped.    Acute blood loss anemia  -Bleeding from the surgical site.  Discussed with general surgery.  Plan for washout in the OR tomorrow morning.  Monitor hemoglobins daily and transfuse to maintain hemoglobin more than 7.  Currently on a heparin drip which can be continued as per general surgery due to DVT.      Provoked RLE DVT  - US showed Occlusive DVT noted throughout the right femoral, popliteal, and posterior tibial veins.  Nonocclusive thrombus is also noted within the right peroneal  Vein  -Provoked DVT 2/2 infection versus recent hip surgery  -continue with heparin gtt, transition to DOAC once okay with general surgery     CKD 3  -Cr /GFR at baseline                Paroxysmal atrial fibrillation  -- Not on anticoagulation previously.      Aortic valve stenosis  -- As per cardiology recommendation, given 
    V2.0    [facility] [place]      Name:  Westley Rios /Age/Sex: 1926  (89 y.o. male)   MRN & CSN:  [id number] & [id number] Encounter Date/Time: 2024 9:42 AM EST   Location:  [facility] PCP: Jonathan Snow MD     Attending:Siddharth Ryder,*       Hospital Day: 9    Assessment and Recommendations   Westley Rios is a 89 y.o. male who presents with Abscess of right leg      Plan:     Sepsis 2/2 RLE abscess   -Patient presents with acute onset RLE pain  -On admit patient hemodynamically stable, afebrile, leukocytosis of 23, increased inflammatory markers   -CT of right tibula showed fluid collection in the subcutaneous tissues overlying the  posterior musculature running from the level of the knee down the calf to the  mid to distal portion.  Skin thickening and there are a few small foci of gas  -General surgery was consulted as per general surgery low suspicion for necrotizing fasciitis.  -Underwent I&D in the OR on .  Surgical cultures now growing Staph aureus and strep pyogenes.  Awaiting sensitivities continue Vanco alone and clindamycin and cefepime have been stopped.  -Infectious disease has been consulted: Awaiting final recommendations    Acute blood loss anemia  -Bleeding from the surgical site.  Discussed with general surgery.  Plan for washout in the OR tomorrow morning.  Monitor hemoglobins daily and transfuse to maintain hemoglobin more than 7.  Currently on a heparin drip which can be continued as per general surgery due to DVT.   -Hemoglobin drop on 2024.  Transfuse PRBC  Resuming heparin on 2024.  Will start with no bolus.  Discussed with general surgery.     Provoked RLE DVT  - US showed Occlusive DVT noted throughout the right femoral, popliteal, and posterior tibial veins.  Nonocclusive thrombus is also noted within the right peroneal  Vein  -Provoked DVT 2/2 infection & recent hip surgery  -IR consulted for IVC filter placement.  -Resuming heparin on 
    V2.0    [facility] [place]      Name:  Westley Rios /Age/Sex: 1926  (89 y.o. male)   MRN & CSN:  [id number] & [id number] Encounter Date/Time: 2024 9:42 AM EST   Location:  [facility] PCP: Jonathan Snow MD     Attending:Siddharth Ryder,*       Hospital Day: 11    Assessment and Recommendations   Westley Rios is a 89 y.o. male who presents with Abscess of right leg      Plan:     Sepsis 2/2 RLE abscess   -Patient presents with acute onset RLE pain  -On admit patient hemodynamically stable, afebrile, leukocytosis of 23, increased inflammatory markers   -CT of right tibula showed fluid collection in the subcutaneous tissues overlying the  posterior musculature running from the level of the knee down the calf to the  mid to distal portion.  Skin thickening and there are a few small foci of gas  -General surgery was consulted as per general surgery low suspicion for necrotizing fasciitis.  -Underwent I&D in the OR on .  Surgical cultures now growing Staph aureus and strep pyogenes.  Awaiting sensitivities continue Vanco alone and clindamycin and cefepime have been stopped.  -Infectious disease has been consulted: Will treat with IV with all the patients in the hospital and at the time of discharge we will give an additional 7 days of oral Zyvox.    Acute blood loss anemia  -Bleeding from the surgical site.  Discussed with general surgery.  Plan for washout in the OR tomorrow morning.  Monitor hemoglobins daily and transfuse to maintain hemoglobin more than 7.  Currently on a heparin drip which can be continued as per general surgery due to DVT.   -Hemoglobin drop on 2024.  Transfuse PRBC  Resuming heparin on 2024.  Will start with no bolus.  Discussed with general surgery.  Temporary holding heparin and will likely resume later today or tomorrow.     Provoked RLE DVT  - US showed Occlusive DVT noted throughout the right femoral, popliteal, and posterior tibial veins.  
   01/05/24 1521   Encounter Summary   Encounter Overview/Reason  Initial Encounter   Service Provided For: Patient   Referral/Consult From: [facility]   Support System Spouse;Children   Last Encounter  01/05/24  (Patient did not express any needs at the time of visit. Patient was in good spirit. Patient appreciated this  for the visit.)   Complexity of Encounter Low   Begin Time 1505   End Time  1520   Total Time Calculated 15 min   Spiritual/Emotional needs   Type Spiritual Support   Grief, Loss, and Adjustments   Type Bereavement Care;Life Adjustments   Assessment/Intervention/Outcome   Assessment Coping;Calm   Intervention Active listening;Empowerment;Sustaining Presence/Ministry of presence;Explored Coping Skills/Resources;Explored/Affirmed feelings, thoughts, concerns   Outcome Coping;Encouraged;Engaged in conversation;Expressed Gratitude;Receptive   Plan and Referrals   Plan/Referrals Continue Support (comment)       
  Physical Therapy Treatment Note  Name: Westley Rios MRN: [id number] :   1926   Date:  1/10/2024   Admission Date: 2023 Room:  [address] [place] [address]     Restrictions/Precautions:          general precautions, fall risk, contact isolation, wound vac    Communication with other providers:  RN, OT, wound care    Subjective:  Patient states:  \"Lousy\"  Pain:   Location, Type, Intensity (0/10 to 10/10):  denies pain at rest    Objective:    Observation:  pt up in chair with wound care present, requesting assist to transfer pt to bed for their session    Treatment, including education/measures:    Bed mobility: pt completed sit to supine mod A x2 with assist at trunk, hips, and bilateral LEs. Pt able to assist with bilateral LE advancement this date compared to last trial.    Transfers: pt completed stand pivot from chair to EOB mod A x2 with cues for sequencing    Balance: pt sat EOB mod A with retropulsion and bilateral UE support    Assessment / Impression:    Pt L sidelying at end of session with wound care present     Patient's tolerance of treatment:  Fair   Adverse Reaction: n/a  Significant change in status and impact:  n/a  Barriers to improvement:  decreased endurance, impaired cognition    Plan for Next Session:    Continue to address transfer training, bed mobility, and balance training in future sessions    Time in:  1011  Time out:  1019  Timed treatment minutes:  8  Total treatment time:  8    Previously filed items:  Social/Functional History  Lives With: Spouse  Type of Home: Assisted living  Home Layout: One level  Home Access: Level entry  Bathroom Shower/Tub: Walk-in shower  Bathroom Toilet: Handicap height  Bathroom Equipment: Grab bars in shower, Shower chair  Bathroom Accessibility: Accessible  Home Equipment: Rollator, Cane  Has the patient had two or more falls in the past year or any fall with injury in the past year?: No  Receives Help From: Family  ADL Assistance: Independent  Homemaking 
  Physical Therapy Treatment Note  Name: Westley Rios MRN: [id number] :   1926   Date:  2024   Admission Date: 2023 Room:  [address] [place] [address]     Restrictions/Precautions:          contact isolation, general precautions, fall risk    Communication with other providers:  RN, OT    Subjective:  Patient states:  \"Oh, to be young\"  Pain:   Location, Type, Intensity (0/10 to 10/10):  states pain in R LE with weightbearing, does not numerically rate    Objective:    Observation:  pt supine in bed upon entry and agreeable to session    Treatment, including education/measures:    Bed mobility: pt completed supine to sitting EOB mod A with assist at LEs and trunk. Pt completed sit to supine min A with assist at LEs. Pt completed rolling to L side min A      Transfers: pt completed STS to/from EOB mod A x2 with cues for hand placement and sequencing.     Balance: pt sat EOB min A progressing to SBA. Pt then stood at RW mod A with left lateral lean. Cues provided for upright posture throughout    Assessment / Impression:    Pt supine in bed with alarm on, needs in reach, and wound care present.    Patient's tolerance of treatment:  fair   Adverse Reaction: n/a  Significant change in status and impact:  n/a  Barriers to improvement:  decreased endurance, impaired transfers    Plan for Next Session:    Continue to address bed mobility, transfer training, and balance training in future sessions    Time in:  1033  Time out:  1051  Timed treatment minutes:  18  Total treatment time:  18    Previously filed items:  Social/Functional History  Lives With: Spouse  Type of Home: Assisted living  Home Layout: One level  Home Access: Level entry  Bathroom Shower/Tub: Walk-in shower  Bathroom Toilet: Handicap height  Bathroom Equipment: Grab bars in shower, Shower chair  Bathroom Accessibility: Accessible  Home Equipment: Rollator, Cane  Has the patient had two or more falls in the past year or any fall with injury in the past 
  Physician Progress Note      PATIENT:               MARTINA TREVINO  CSN #:                  [id number]  :                       1926  ADMIT DATE:       2023 9:33 PM  DISCH DATE:  RESPONDING  PROVIDER #:        AYDEN BONNER          QUERY TEXT:    Internal Medicine,    Pt admitted with sepsis due to RLE abscess and has conflicting documentation   regarding necrotizing fasciitis.  If possible, please document in progress   notes and discharge summary:    The medical record reflects the following:  Risk Factors: MRSA  Clinical Indicators: probable necrotizing fasciitis documented by Infectious   Disease, low suspicion of necrotizing fasciitis per Surgeon  Treatment: labs, imaging, ID and Surgical consult, atb, wound care    Thank you,  Dana Woodall RN CDS  [phone]  Options provided:  -- Necrotizing fasciitis confirmed present on admission  -- Necrotizing fasciitis ruled out  -- Other - I will add my own diagnosis  -- Disagree - Not applicable / Not valid  -- Disagree - Clinically unable to determine / Unknown  -- Refer to Clinical Documentation Reviewer    PROVIDER RESPONSE TEXT:    The diagnosis of necrotizing fasciitis was ruled out.    Query created by: Dana Woodall on 2024 4:30 PM      Electronically signed by:  AYDEN BONNER 1/10/2024 2:54 PM          
0608 Patient arrived to PACU from OR. Monitors applied and alarms on. Report from Felipe CASEY.  0622 Patient medicated for pain.  0630 Patient turned and repositioned.  0634 Small amount of drainage noticed from site.   0650 Several attempts to call report to nurse on floor have been unsuccessful.   0654 PACU care completed. Waiting on nurse to be able to take report on floor.  0704 Patient transferred out of PACU to floor.  
0925: Patient arrived to PACU while waiting to go to surgery. Monitors on, alarms on.  0930: Dr. Bermudez at bedside speaking with patient regarding surgery. Consent obtained. Heparin stopped as requested per Dr. Bermudez.  1001: Dr. Conley speaking with patient, Consent obtained.  1020: Dozing.  1039: Transported to the OR.  
4 Eyes Skin Assessment     NAME:  Westley Rios  YOB: 1926  MEDICAL RECORD NUMBER:  [id number]    The patient is being assessed for  Admission    I agree that at least one RN has performed a thorough Head to Toe Skin Assessment on the patient. ALL assessment sites listed below have been assessed.      Areas assessed by both nurses:    Head, Face, Ears, Shoulders, Back, Chest, Arms, Elbows, Hands, Sacrum. Buttock, Coccyx, Ischium, and Legs. Feet and Heels        Does the Patient have a Wound? Yes wound(s) were present on assessment. LDA wound assessment was Initiated and completed by RN       Mino Prevention initiated by RN: Yes  Wound Care Orders initiated by RN: Yes    Pressure Injury (Stage 3,4, Unstageable, DTI, NWPT, and Complex wounds) if present, place Wound referral order by RN under : No    New Ostomies, if present place, Ostomy referral order under : No     Nurse 1 eSignature: Electronically signed by Damaso Valle RN on 12/28/23 at 2:32 PM EST    **SHARE this note so that the co-signing nurse can place an eSignature**    Nurse 2 eSignature: Electronically signed by Deirdre Rodriguez RN on 12/28/23 at 3:24 PM EST   
Admitted by my colleague earlier this morning.  Patient seen and examined.    Patient reports the right lower extremity pain is somewhat improved.  Surgical consultation is pending.  Continue IV fluids and IV antibiotics for now.  
Attempt to update wife and daughter unsuccessful. VM left for wife.   
Bed not inflating, called [employer] and spoken with [employer]-customer representative and ordered bed/mattress replacement. Confirmation number [id number]   
Clinical Pharmacy Progress Note    Vancomycin has been discontinued. Pharmacy will sign off. Please re-consult pharmacy if vancomycin dosing is wanted in the future.    Please call with questions.  Aletha Worthy, PharmD, [facility], [facility]  Main Pharmacy: [id number]  1/8/2024 10:22 AM      
Comprehensive Nutrition Assessment    Type and Reason for Visit:  Reassess    Nutrition Recommendations/Plan:   Continue current diet and supplement, please offer between meals  Assist with set up as needed  If intake does not improve, consider need for NG tube feeding if consistent with goals of care     Malnutrition Assessment:  Malnutrition Status:  Moderate malnutrition (01/09/24 1450)    Context:  Acute Illness     Findings of the 6 clinical characteristics of malnutrition:  Energy Intake:  75% or less of estimated energy requirements for 7 or more days  Weight Loss:  Greater than 5% in less than 1 month     Body Fat Loss:  No significant body fat loss     Muscle Mass Loss:  Mild muscle mass loss Clavicles (pectoralis & deltoids), Temples (temporalis)  Fluid Accumulation:  Mild Extremities   Strength:  Not Performed    Nutrition Assessment:    Pt continues with DVT in setting of anemia, wound vac. His po intake has been variable over stay, able to feed self after set up. Today he has consumed less than half of his meals and supplements. Many extra oral supplements in room. He has lost 15% since admission (~2 weeks), partially fluid related likely. Pt does meet criteria for moderate malnutrition. Will continue to follow as high nutrition risk.    Nutrition Related Findings:    Cr 1.5, GFR 42, Glu 103, H/H 8.3/28.6   Wound Type: Multiple, Pressure Injury, Deep Tissue Injury, Surgical Incision       Current Nutrition Intake & Therapies:    Average Meal Intake: 1-25%, 26-50%  Average Supplements Intake: 1-25%  ADULT ORAL NUTRITION SUPPLEMENT; Breakfast, Lunch, Dinner; Standard High Calorie/High Protein Oral Supplement  ADULT DIET; Easy to Chew    Anthropometric Measures:  Height: 175.3 cm (5' 9\")  Ideal Body Weight (IBW): 160 lbs (73 kg)    Admission Body Weight: 85.6 kg (188 lb 11.4 oz)  Current Body Weight: 72.2 kg (159 lb 2.8 oz), 126.1 % IBW. Weight Source: Bed Scale  Current BMI (kg/m2): 23.5  Usual Body 
Comprehensive Nutrition Assessment    Type and Reason for Visit:  Reassess    Nutrition Recommendations/Plan:   Continue current diet and supplements. Encourage source of protein with each meal   Please encourage and record PO intake TID      Malnutrition Assessment:  Malnutrition Status:  Moderate malnutrition (01/05/24 1201)    Context:  Acute Illness     Findings of the 6 clinical characteristics of malnutrition:  Energy Intake:  75% or less of estimated energy requirements for 7 or more days  Weight Loss:  Greater than 2% over 1 week     Body Fat Loss:  Unable to assess     Muscle Mass Loss:  Unable to assess    Fluid Accumulation:  Unable to assess     Strength:  Not Performed    Nutrition Assessment:    Pt continues with wound vac, H/H low, noted bleeding from IV pulled out. PO intake of meals less than 50% but consuming supplements with greater than 50% intake. Pt with dramatic weight loss recorded from last nutrition assessment.    Nutrition Related Findings:    excisional debridement of lower extremity 12/29 and 1/1. H/H 7.7/25.7 has had transfusion during los. BUN 14, Cr 1.5, GFR 42, Glu 117. Wound Type: Multiple, Pressure Injury, Deep Tissue Injury, Surgical Incision       Non-pitting edema to BUE, LLE. +1 RLE edema     Current Nutrition Intake & Therapies:    Average Meal Intake: 26-50%  Average Supplements Intake: 51-75%  ADULT DIET; Regular  ADULT ORAL NUTRITION SUPPLEMENT; Breakfast, Lunch, Dinner; Standard High Calorie/High Protein Oral Supplement    Anthropometric Measures:  Height: 175.3 cm (5' 9\")  Ideal Body Weight (IBW): 160 lbs (73 kg)    Admission Body Weight: 85.6 kg (188 lb 11.4 oz)  Current Body Weight: 77.6 kg (171 lb 1.2 oz), 126.1 % IBW. Weight Source: Bed Scale  Current BMI (kg/m2): 25.3  Usual Body Weight: 85 kg (187 lb 6.3 oz)  % Weight Change (Calculated): -8.7  Weight Adjustment For: No Adjustment                 BMI Categories: Overweight (BMI 25.0-29.9)    Estimated Daily 
Comprehensive Nutrition Assessment    Type and Reason for Visit:  Reassess    Nutrition Recommendations/Plan:   Continue regular diet and nutrition supplements.   Please encourage and record PO intake TID      Malnutrition Assessment:  Malnutrition Status:  Insufficient data (12/29/23 1150)    Context:  Acute Illness       Nutrition Assessment:    Pt s/p multiple surgical interventions to wound. PO intake inconsistent 1-100%. Continues ordered on oral nutrition supplement. PO Intake of breakfast and supplement were greater than 75% this morning.    Nutrition Related Findings:    excisional debridement of lower extremity 12/29 and 1/1. H/H 6.4/20.7 with infusion planned today. BUN 18, Cr 1.5, GFR 42, Glu 93. Wound Type: Multiple, Pressure Injury, Deep Tissue Injury, Surgical Incision       Current Nutrition Intake & Therapies:    Average Meal Intake: Unable to assess, 1-25%, %  Average Supplements Intake: Unable to assess, 1-25%, %  ADULT DIET; Regular  ADULT ORAL NUTRITION SUPPLEMENT; Breakfast, Lunch, Dinner; Standard High Calorie/High Protein Oral Supplement    Anthropometric Measures:  Height: 175.3 cm (5' 9\")  Ideal Body Weight (IBW): 160 lbs (73 kg)    Admission Body Weight: 85.6 kg (188 lb 11.4 oz)  Current Body Weight: 91.5 kg (201 lb 11.5 oz), 126.1 % IBW. Weight Source: Bed Scale  Current BMI (kg/m2): 29.8  Usual Body Weight: 85 kg (187 lb 6.3 oz)  % Weight Change (Calculated): 7.6  Weight Adjustment For: No Adjustment                 BMI Categories: Overweight (BMI 25.0-29.9)    Estimated Daily Nutrient Needs:  Energy Requirements Based On: Formula  Weight Used for Energy Requirements: Current  Energy (kcal/day): 1900 (mifflin st jeor)  Weight Used for Protein Requirements: Ideal  Protein (g/day):  (1.2-1.4 g/kg)     Fluid (ml/day): 1900    Nutrition Diagnosis:   Predicted inadequate energy intake related to increase demand for energy/nutrients as evidenced by poor intake prior to 
GENERAL SURGERY PROGRESS NOTE    Westley Rios is a 89 y.o. male status post excisional debridement of lower extremity 12/29 and 1/1.                Subjective:    No new concerns today.  Patient states some tenderness when moving his leg.  Nurse states the machine did get knocked off and unplugged when patient tried to get out of bed.  Currently with good seal.    Objective:    Vitals: VITALS:  /63   Pulse 71   Temp 97.9 °F (36.6 °C) (Oral)   Resp 16   Ht 1.753 m (5' 9\")   Wt 77.6 kg (171 lb 1.2 oz)   SpO2 98%   BMI 25.26 kg/m²     I/O: 01/05 0701 - 01/06 0700  In: -   Out: 450 [Urine:450]    Labs/Imaging Results:   Lab Results   Component Value Date/Time     01/06/2024 04:58 AM    K 4.4 01/06/2024 04:58 AM     01/06/2024 04:58 AM    CO2 22 01/06/2024 04:58 AM    BUN 12 01/06/2024 04:58 AM    CREATININE 1.6 01/06/2024 04:58 AM    GLUCOSE 102 01/06/2024 04:58 AM    CALCIUM 7.5 01/06/2024 04:58 AM      Lab Results   Component Value Date    WBC 10.1 01/06/2024    HGB 7.6 (L) 01/06/2024    HCT 25.0 (L) 01/06/2024    MCV 99.2 01/06/2024     01/06/2024          IV Fluids:   sodium chloride    sodium chloride    sodium chloride    heparin (PORCINE) Infusion Last Rate: Stopped (01/05/24 0450)    sodium chloride Last Rate: 25 mL (12/28/23 1521)    Scheduled Meds:   ezetimibe, 10 mg, Oral, Nightly    traZODone, 50 mg, Oral, Nightly    rOPINIRole, 0.75 mg, Oral, Nightly    metoprolol succinate, 12.5 mg, Oral, Daily    sodium chloride flush, 5-40 mL, IntraVENous, 2 times per day    vancomycin (VANCOCIN) intermittent dosing (placeholder), , Other, RX Placeholder    Physical Exam:  General: A&O x 3, no distress.   HEENT: Anicteric sclerae, MMM.  Extremities: No edema bilat LE.  Abdomen: Soft, nondistended, nontender   Right lower extremity with wound VAC over calf wound with good seal.  No surrounding erythema.  There is some mild lateral edema      Assessment and Plan:     Patient Active Problem 
GENERAL SURGERY PROGRESS NOTE    Westley Rios is a 89 y.o. male status post excisional debridement of lower extremity 12/29 and 1/1.                Subjective:    Pain controlled.  No new complaints today.    Objective:    Vitals: VITALS:  /80   Pulse 90   Temp 97.7 °F (36.5 °C) (Oral)   Resp 22   Ht 1.753 m (5' 9\")   Wt 91.5 kg (201 lb 11.5 oz)   SpO2 100%   BMI 29.79 kg/m²     I/O: 01/01 0701 - 01/02 0700  In: 380 [P.O.:80; I.V.:300]  Out: 500 [Urine:500]    Labs/Imaging Results:   Lab Results   Component Value Date/Time     01/02/2024 03:59 AM    K 4.2 01/02/2024 03:59 AM     01/02/2024 03:59 AM    CO2 22 01/02/2024 03:59 AM    BUN 18 01/02/2024 03:59 AM    CREATININE 1.5 01/02/2024 03:59 AM    GLUCOSE 93 01/02/2024 03:59 AM    CALCIUM 7.6 01/02/2024 03:59 AM      Lab Results   Component Value Date    WBC 16.9 (H) 01/02/2024    HGB 6.4 (LL) 01/02/2024    HCT 20.7 (L) 01/02/2024    MCV 96.7 01/02/2024     01/02/2024          IV Fluids:   sodium chloride    lactated ringers IV soln Last Rate: 1,000 mL (01/01/24 2316)    sodium chloride    sodium chloride    [Held by provider] heparin (PORCINE) Infusion Last Rate: 16 Units/kg/hr (01/02/24 0724)    sodium chloride Last Rate: 25 mL (12/28/23 1521)    Scheduled Meds:   ezetimibe, 10 mg, Oral, Nightly    traZODone, 50 mg, Oral, Nightly    rOPINIRole, 0.75 mg, Oral, Nightly    [Held by provider] metoprolol succinate, 12.5 mg, Oral, Daily    sodium chloride flush, 5-40 mL, IntraVENous, 2 times per day    vancomycin (VANCOCIN) intermittent dosing (placeholder), , Other, RX Placeholder    Physical Exam:  General: A&O x 3, no distress.   HEENT: Anicteric sclerae, MMM.  Extremities: No edema bilat LE.  Abdomen: Soft, nondistended, nontender   Right lower extremity with wound VAC over calf wound with good seal.  No surrounding erythema.      Assessment and Plan:     Patient Active Problem List:     Chronic kidney disease, stage III (moderate) 
GENERAL SURGERY PROGRESS NOTE    Westley Rios is a 89 y.o. male status post excisional debridement of lower extremity 12/29 and 1/1.                Subjective:    Pain controlled.  Wound VAC in place with serous drainage.    Objective:    Vitals: VITALS:  BP (!) 151/71   Pulse 76   Temp 98.1 °F (36.7 °C) (Oral)   Resp 16   Ht 1.753 m (5' 9\")   Wt 78.2 kg (172 lb 6.4 oz)   SpO2 97%   BMI 25.46 kg/m²     I/O: 01/07 0701 - 01/08 0700  In: 701.5 [P.O.:360]  Out: 1300 [Urine:1100; Drains:200]    Labs/Imaging Results:   Lab Results   Component Value Date/Time     01/08/2024 03:53 AM    K 4.4 01/08/2024 03:53 AM     01/08/2024 03:53 AM    CO2 23 01/08/2024 03:53 AM    BUN 10 01/08/2024 03:53 AM    CREATININE 1.5 01/08/2024 03:53 AM    GLUCOSE 88 01/08/2024 03:53 AM    CALCIUM 7.5 01/08/2024 03:53 AM      Lab Results   Component Value Date    WBC 7.5 01/08/2024    HGB 8.4 (L) 01/08/2024    HCT 27.6 (L) 01/08/2024    MCV 96.2 01/08/2024     01/08/2024          IV Fluids:   sodium chloride    sodium chloride    sodium chloride    sodium chloride    [Held by provider] heparin (PORCINE) Infusion Last Rate: 18 Units/kg/hr (01/07/24 0353)    sodium chloride Last Rate: 25 mL (12/28/23 1521)    Scheduled Meds:   vancomycin, 15 mg/kg, IntraVENous, Q48H    ezetimibe, 10 mg, Oral, Nightly    traZODone, 50 mg, Oral, Nightly    rOPINIRole, 0.75 mg, Oral, Nightly    metoprolol succinate, 12.5 mg, Oral, Daily    sodium chloride flush, 5-40 mL, IntraVENous, 2 times per day    Physical Exam:  General: A&O x 3, no distress.   HEENT: Anicteric sclerae, MMM.  Extremities: No edema bilat LE.  Abdomen: Soft, nondistended, nontender   Right lower extremity with wound VAC over calf wound with good seal.  No surrounding erythema.      Assessment and Plan:     Patient Active Problem List:     Chronic kidney disease, stage III (moderate) (HCC)     Chronic bilateral low back pain without sciatica     Generalized edema     
GENERAL SURGERY PROGRESS NOTE    Westley Rios is a 89 y.o. male status post excisional debridement of lower extremity 12/29 and 1/1.                Subjective:    Patient currently up to chair. Pain controlled. No new complaints. Tolerating diet. Wound vac in place with good seal. Serosanguinous output in canister. Hgb stable.    Objective:    Vitals: VITALS:  /88   Pulse 80   Temp 97.8 °F (36.6 °C) (Oral)   Resp 23   Ht 1.753 m (5' 9\")   Wt 77.6 kg (171 lb 1.2 oz)   SpO2 100%   BMI 25.26 kg/m²     I/O: 01/03 0701 - 01/04 0700  In: 360 [P.O.:360]  Out: 1000 [Urine:750; Drains:250]    Labs/Imaging Results:   Lab Results   Component Value Date/Time     01/04/2024 04:58 AM    K 4.2 01/04/2024 04:58 AM     01/04/2024 04:58 AM    CO2 25 01/04/2024 04:58 AM    BUN 15 01/04/2024 04:58 AM    CREATININE 1.6 01/04/2024 04:58 AM    GLUCOSE 103 01/04/2024 04:58 AM    CALCIUM 7.9 01/04/2024 04:58 AM      Lab Results   Component Value Date    WBC 11.9 (H) 01/04/2024    HGB 8.3 (L) 01/04/2024    HCT 26.4 (L) 01/04/2024    MCV 97.1 01/04/2024     01/04/2024          IV Fluids:   sodium chloride    sodium chloride    sodium chloride    heparin (PORCINE) Infusion Last Rate: 18 Units/kg/hr (01/04/24 1659)    sodium chloride Last Rate: 25 mL (12/28/23 1521)    Scheduled Meds:   ezetimibe, 10 mg, Oral, Nightly    traZODone, 50 mg, Oral, Nightly    rOPINIRole, 0.75 mg, Oral, Nightly    metoprolol succinate, 12.5 mg, Oral, Daily    sodium chloride flush, 5-40 mL, IntraVENous, 2 times per day    vancomycin (VANCOCIN) intermittent dosing (placeholder), , Other, RX Placeholder    Physical Exam:  General: A&O x 3, no distress.   HEENT: Anicteric sclerae, MMM.  Extremities: No edema bilat LE.  Abdomen: Soft, nondistended, nontender   Right lower extremity with wound VAC over calf wound with good seal.  No surrounding erythema.      Assessment and Plan:     Patient Active Problem List:     Chronic kidney 
GENERAL SURGERY PROGRESS NOTE    Westley Rios is a 89 y.o. male status post excisional debridement of lower extremity 12/29 and 1/1.                Subjective:    Patient had some bleeding from IV this morning.  Had bleeding with wound VAC change which was controlled with silver nitrate per wound care nursing.  Patient resting comfortably in bed this p.m.  No new concerns today.    Objective:    Vitals: VITALS:  BP (!) 132/57   Pulse 77   Temp 97.7 °F (36.5 °C) (Oral)   Resp 10   Ht 1.753 m (5' 9\")   Wt 77.6 kg (171 lb 1.2 oz)   SpO2 99%   BMI 25.26 kg/m²     I/O: No intake/output data recorded.    Labs/Imaging Results:   Lab Results   Component Value Date/Time     01/05/2024 04:39 AM    K 4.5 01/05/2024 04:39 AM     01/05/2024 04:39 AM    CO2 24 01/05/2024 04:39 AM    BUN 14 01/05/2024 04:39 AM    CREATININE 1.5 01/05/2024 04:39 AM    GLUCOSE 117 01/05/2024 04:39 AM    CALCIUM 7.5 01/05/2024 04:39 AM      Lab Results   Component Value Date    WBC 12.8 (H) 01/05/2024    HGB 7.7 (L) 01/05/2024    HCT 25.7 (L) 01/05/2024    .2 (H) 01/05/2024     01/05/2024          IV Fluids:   sodium chloride    sodium chloride    sodium chloride    heparin (PORCINE) Infusion Last Rate: Stopped (01/05/24 0450)    sodium chloride Last Rate: 25 mL (12/28/23 1521)    Scheduled Meds:   ezetimibe, 10 mg, Oral, Nightly    traZODone, 50 mg, Oral, Nightly    rOPINIRole, 0.75 mg, Oral, Nightly    metoprolol succinate, 12.5 mg, Oral, Daily    sodium chloride flush, 5-40 mL, IntraVENous, 2 times per day    vancomycin (VANCOCIN) intermittent dosing (placeholder), , Other, RX Placeholder    Physical Exam:  General: A&O x 3, no distress.   HEENT: Anicteric sclerae, MMM.  Extremities: No edema bilat LE.  Abdomen: Soft, nondistended, nontender   Right lower extremity with wound VAC over calf wound with good seal.  No surrounding erythema.      Assessment and Plan:     Patient Active Problem List:     Chronic kidney 
GENERAL SURGERY PROGRESS NOTE    Westley Rios is a 89 y.o. male status post excisional debridement of lower extremity 12/29 and 1/1.                Subjective:    Patient testing testing testing status post patient still denies bowel wound VAC changed today.  Late entry.  Patient seen in AM.  Pain controlled.  No new complaints.    Objective:    Vitals: VITALS:  BP (!) 146/61   Pulse 87   Temp 97.5 °F (36.4 °C) (Oral)   Resp 22   Ht 1.753 m (5' 9\")   Wt 91.5 kg (201 lb 11.5 oz)   SpO2 100%   BMI 29.79 kg/m²     I/O: 01/02 0701 - 01/03 0700  In: 956.5 [P.O.:600]  Out: 1000 [Urine:700; Drains:300]    Labs/Imaging Results:   Lab Results   Component Value Date/Time     01/03/2024 10:05 AM    K 4.4 01/03/2024 10:05 AM     01/03/2024 10:05 AM    CO2 24 01/03/2024 10:05 AM    BUN 16 01/03/2024 10:05 AM    CREATININE 1.5 01/03/2024 10:05 AM    GLUCOSE 103 01/03/2024 10:05 AM    CALCIUM 7.6 01/03/2024 10:05 AM      Lab Results   Component Value Date    WBC 11.6 (H) 01/03/2024    HGB 7.5 (L) 01/03/2024    HCT 24.1 (L) 01/03/2024    MCV 98.4 01/03/2024     01/03/2024          IV Fluids:   sodium chloride    sodium chloride    sodium chloride    [Held by provider] heparin (PORCINE) Infusion Last Rate: 16 Units/kg/hr (01/02/24 0724)    sodium chloride Last Rate: 25 mL (12/28/23 1521)    Scheduled Meds:   ezetimibe, 10 mg, Oral, Nightly    traZODone, 50 mg, Oral, Nightly    rOPINIRole, 0.75 mg, Oral, Nightly    [Held by provider] metoprolol succinate, 12.5 mg, Oral, Daily    sodium chloride flush, 5-40 mL, IntraVENous, 2 times per day    vancomycin (VANCOCIN) intermittent dosing (placeholder), , Other, RX Placeholder    Physical Exam:  General: A&O x 3, no distress.   HEENT: Anicteric sclerae, MMM.  Extremities: No edema bilat LE.  Abdomen: Soft, nondistended, nontender   Right lower extremity with wound VAC over calf wound with good seal.  No surrounding erythema.      Assessment and Plan:     Patient 
GENERAL SURGERY PROGRESS NOTE    Westley Rios is a 89 y.o. male status post right lower extremity debridement 12/29/2023                Subjective:    Pain over lower extremity.  Ongoing oozing.  Dressing changed again with nursing at bedside    Objective:    Vitals: VITALS:  BP (!) 136/57   Pulse 73   Temp 97.8 °F (36.6 °C) (Axillary)   Resp 15   Ht 1.753 m (5' 9\")   Wt 82 kg (180 lb 12.4 oz)   SpO2 98%   BMI 26.70 kg/m²     I/O: 12/30 0701 - 12/31 0700  In: 401 [P.O.:240]  Out: 550 [Urine:550]    Labs/Imaging Results:   Lab Results   Component Value Date/Time     12/31/2023 09:41 AM    K 3.9 12/31/2023 09:41 AM     12/31/2023 09:41 AM    CO2 24 12/31/2023 09:41 AM    BUN 20 12/31/2023 09:41 AM    CREATININE 1.4 12/31/2023 09:41 AM    GLUCOSE 97 12/31/2023 09:41 AM    CALCIUM 7.3 12/31/2023 09:41 AM      Lab Results   Component Value Date    WBC 16.5 (H) 12/31/2023    HGB 5.0 (LL) 12/31/2023    HCT 16.3 (LL) 12/31/2023    MCV 91.6 12/31/2023     12/31/2023          IV Fluids:   [START ON 1/1/2024] lactated ringers IV soln    sodium chloride    sodium chloride    heparin (PORCINE) Infusion Last Rate: 16 Units/kg/hr (12/31/23 0517)    sodium chloride Last Rate: 25 mL (12/28/23 1521)    Scheduled Meds:   ezetimibe, 10 mg, Oral, Nightly    traZODone, 50 mg, Oral, Nightly    rOPINIRole, 0.75 mg, Oral, Nightly    [Held by provider] metoprolol succinate, 12.5 mg, Oral, Daily    sodium chloride flush, 5-40 mL, IntraVENous, 2 times per day    vancomycin (VANCOCIN) intermittent dosing (placeholder), , Other, RX Placeholder    Physical Exam:  General: A&O x 3, no distress.   HEENT: Anicteric sclerae, MMM.  Extremities: No edema bilat LE.  Abdomen: Soft, nondistended, nontender   Right lower extremity with large wound over calf.  Wound with oozing on distal aspect.  There is minor sloughing tissue.  There are some ischemic appearing tissue in the distal aspect.    Assessment and Plan:     Patient 
GENERAL SURGERY PROGRESS NOTE    Westley Rios is a 89 y.o. male status post right lower extremity debridement 12/29/2023                Subjective:    Pain over lower extremity.  Some oozing from wound overnight.  Tolerated dressing change today at bedside.    Objective:    Vitals: VITALS:  /62   Pulse 69   Temp 97.5 °F (36.4 °C) (Oral)   Resp 18   Ht 1.753 m (5' 9\")   Wt 80.3 kg (177 lb 0.5 oz)   SpO2 93%   BMI 26.14 kg/m²     I/O: 12/29 0701 - 12/30 0700  In: 1277.7 [P.O.:360; I.V.:621]  Out: 525 [Urine:525]    Labs/Imaging Results:   Lab Results   Component Value Date/Time     12/29/2023 08:26 AM    K 4.1 12/29/2023 08:26 AM     12/29/2023 08:26 AM    CO2 23 12/29/2023 08:26 AM    BUN 30 12/29/2023 08:26 AM    CREATININE 1.9 12/29/2023 08:26 AM    GLUCOSE 109 12/29/2023 08:26 AM    CALCIUM 8.0 12/29/2023 08:26 AM      Lab Results   Component Value Date    WBC 17.8 (H) 12/29/2023    HGB 7.8 (L) 12/29/2023    HCT 26.4 (L) 12/29/2023    MCV 96.5 12/29/2023     12/29/2023          IV Fluids:   heparin (PORCINE) Infusion Last Rate: 1,470 Units/hr (12/30/23 0721)    sodium chloride Last Rate: 25 mL (12/28/23 1521)    lactated ringers IV soln Last Rate: 100 mL/hr at 12/30/23 0434    Scheduled Meds:   ezetimibe, 10 mg, Oral, Nightly    traZODone, 50 mg, Oral, Nightly    rOPINIRole, 0.75 mg, Oral, Nightly    [Held by provider] metoprolol succinate, 12.5 mg, Oral, Daily    sodium chloride flush, 5-40 mL, IntraVENous, 2 times per day    vancomycin (VANCOCIN) intermittent dosing (placeholder), , Other, RX Placeholder    Physical Exam:  General: A&O x 3, no distress.   HEENT: Anicteric sclerae, MMM.  Extremities: No edema bilat LE.  Abdomen: Soft, nondistended, nontender   Right lower extremity with large wound over calf.  Wound base with healthy appearing tissue.  All edges appear viable.  No tracking erythema.  Bloody drainage without any significant purulence.      Assessment and Plan:   
General Surgery-Dr. Pacheco    [facility]: 3    Chief Complaint on Admission: right lower extremity cellulitis       Subjective:     Pt now with purulent drainage from posterior leg.  Denies other complaints but does state the pain is worse today.   Denies F/C.          ROS:  Review of Systems   Musculoskeletal:  Positive for gait problem.   Skin:  Positive for color change.   All other systems reviewed and are negative.      Allergies  Adhesive tape, Adhesive tape, Diatrizoate, Penicillins, Statins, Sulfa antibiotics, and Imdur [isosorbide nitrate]          Diagnosis Date    Arthritis     CAD (coronary artery disease)     H/O Doppler carotid ultrasound 2020    Mod -Severe disease Right ICA, Mild disease Left ICA.    H/O echocardiogram 2020    EF 55-60%, MOD AS, Mild: PHTN, MR, TR & AR. Aortic root 3.8 cm.    Hx of Doppler echocardiogram 2022    EF 50-55% Mod LV hypertrophy. Mildly dialted LA. Heavily calcified aortic valve iwht mod to severe AS. Mitral annular calcification. Mild TR and MR. Mod AR. Dilated aortic root.    Hyperlipidemia     Hypertension     Spinal stenosis        Objective:     Vitals:    23   BP: (!) 111/49   Pulse: 64   Resp: 16   Temp: 98.4 °F (36.9 °C)   SpO2: 97%       TEMPERATURE:  Current -Temp: 98.4 °F (36.9 °C); Max - Temp  Av.9 °F (36.6 °C)  Min: 97.4 °F (36.3 °C)  Max: 98.4 °F (36.9 °C)    No intake/output data recorded.No intake/output data recorded.      Physical Exam:  Physical Exam  Vitals reviewed.   Constitutional:       General: He is not in acute distress.  HENT:      Head: Normocephalic and atraumatic.      Right Ear: External ear normal.      Left Ear: External ear normal.   Eyes:      General:         Right eye: No discharge.         Left eye: No discharge.   Cardiovascular:      Rate and Rhythm: Normal rate.   Pulmonary:      Effort: Pulmonary effort is normal.   Abdominal:      Palpations: Abdomen is soft.   Musculoskeletal:      Comments: 
Infectious Disease Progress Note  2024   Patient Name: Westley Rios : 1926   Impression  Sepsis:  MRSA and Streptococcus pyogenes RLE Abscess with Cellulitis, Probable Necrotizing Fasciitis:  Acute Occlusive RLE DVT s/p IVC Filter Placement 1/3/2024:  Unstageable Left Calcaneal Pressure Injury:  Allergies reported to ABX: PCN (reports had a rash as a teenager, unsure if has ever had Keflex), sulfa (rash)  CrCl 28 on CKD3 (Cr baseline around 2.0, not in VIOLETTA right now)  Afebrile, leukocytosis initially 24.7, now 12.8. Hypoalbuminemia.    -BC 0/2 NGTD  2023 CT Tibia Fibula Left WO Contrast: 1. Crescentic fluid collection in the subcutaneous tissues overlying the posterior musculature running from the level of the knee down the calf to the mid to distal portion.  Skin thickening and there are a few small foci of gas in the subcutaneous tissues and edge of the fluid collection on series 2, images 99, 103, and 108.  Could relate to small gas forming infection and or focal penetrating area at this region.  However there is not general soft tissue emphysema tracking away from this site or along the main portion of the fluid collection.   2.  Surface irregularity and defect along the anteromedial aspect of the lower leg likely with surface wound/ulcer.  No soft tissue gas into the   deeper subcutaneous tissues at this area.   3.  General subcutaneous edema of the lower leg and around the ankle into the foot.  Skin thickening and cellulitis are present at the calf and shin.   4.  No fluid collection in muscular compartment. Heavy arterial calcifications are present.   1/3/2024 IR Guided IVC Filter Placement. Successful inferior vena cava filter placement. PLAN:   Retrieval intent (MIPS): The filter is potentially retrievable. Plan/Timing For Retrieval: Within 175 days.   -Wound culture RLE: Streptococcus pyogenes, MRSA (ANDREA to vanco is 1)  -Wound culture RLE: Streptococcus 
Lab called and notified to do H&H and Anti-Xa together. Blood transfusion is complete.   
Lab drawn sent to lab @ 1729  
Late entry:     1640 Called phlebotomist for pt's antixa due at 1700.    1730: This nurse called a phlebotmist for a second time for pt's anti-xa. Karen from lab stated \" someone will be there\".    1920. This nurse called lab personnel again for pt's anti xa.   
Late entry:     1800: Deirdre HOPPER, called this nurse about pt's daughter stating that pt's lower denture is missing. Charge nurse is aware.    Beginning of my shift I noticed that pt has his upper denture while eating his breakfast, but at the end of my shift, this nurse & night shift nurse was looking for pt's upper & lower dentures, we're both unsuccessful locating them.      
Late entry:     @1530 pt was complaining of sharp pain on his pain rated 10/10. This nurse informed Dr Ryder, EKG was ordered. Pt's bp 155/77, HR 89, RR 15.     @1600 this nurse assessed pt and pt stated that his chest felt better after burping a lot.  Dr. Ryder is aware.    @1700 Pt denies chest pain.      
Medication History  [facility]    Patient Name: Westley Rios 6/20/1926     Medication history has been completed by: Tye Haider CPhT    Source(s) of information: Nursing Home MAR, Nursing Supervisor at [facility].     Primary Care Physician: Jonathan Snow MD     Pharmacy: [facility]    Allergies as of 12/27/2023 - Reviewed 12/27/2023   Allergen Reaction Noted    Adhesive tape  01/01/2020    Adhesive tape  01/20/2020    Diatrizoate  05/20/2015    Penicillins  06/22/2010    Statins  07/21/2010    Sulfa antibiotics  06/22/2010    Imdur [isosorbide nitrate] Rash 06/21/2022        Prior to Admission medications    Medication Sig Start Date End Date Taking? Authorizing Provider   Sodium Phosphates (PHOSPHATE ENEMA RE) Place 1 Application rectally daily as needed   Yes Lissette James MD   magnesium hydroxide (MILK OF MAGNESIA) 400 MG/5ML suspension Take 30 mLs by mouth daily as needed for Constipation   Yes Lissette James MD   doxycycline hyclate (VIBRAMYCIN) 100 MG capsule Take 1 capsule by mouth 2 times daily  1/3/24 Yes Lissette James MD   traZODone (DESYREL) 50 MG tablet Take 1 tablet by mouth nightly 4/1/23   Lissette James MD   Misc. Devices (WALKER WHEELS) MISC 1 each by Does not apply route once for 1 dose 6/21/22 10/11/23  Mark Quiroz, APRN - CNP   methocarbamol (ROBAXIN) 500 MG tablet     ProviderLissette MD   metoprolol succinate (TOPROL XL) 25 MG extended release tablet Take 0.5 tablets by mouth daily    Lissette James MD   rOPINIRole (REQUIP) 0.25 MG tablet Take 3 tablets by mouth nightly    Lissette James MD   oxyCODONE-acetaminophen (PERCOCET) 5-325 MG per tablet Take 1 tablet by mouth 2 times daily.    Lissette James MD   bisacodyl (DULCOLAX) 5 MG EC tablet Take 1 tablet by mouth daily as needed for Constipation    Lissette James MD   acetaminophen (TYLENOL) 325 MG tablet 
Nurse to bedside D/T bed alarm sounding. Patient found standing at side of bed. States I have to pee. This nurse reminded patient he cannot stand at this time and asked patient to sit back in bed. Patient sat on bed and this nurse assisted with returning legs to bed. Complete bed change at this time.   
Occupational Therapy    Occupational Therapy Treatment Note    Name: Westley Rios MRN: [id number] :   1926   Date:  2024   Admission Date: 2023 Room:  [address] [place] [address]-A     Primary Problem:    The primary encounter diagnosis was Cellulitis and abscess of leg. Diagnoses of Chronic kidney disease, unspecified CKD stage, Acute deep vein thrombosis (DVT) of right lower extremity, unspecified vein (HCC), and Abscess were also pertinent to this visit.     Restrictions/Precautions:           fall risk and contact precautions, wound vac       Communication with other providers: RN Mino, RN Deirdre, RAMONA Cormier for safety/assist     Subjective:  Patient states:  Pt agreeable to session.   Pain:   Location, Type, Intensity (0/10 to 10/10):  reports R LE pain with movement, did not rate.     Objective:    Observation: Pt presented supine in bed with tele monitor off initially and turned back on with difficulty taking vitals throughout session.  Observed external catheter leaked onto pad and gown.   Objective Measures:  /69 (90) in supine, 148/74 (86) seated EOB, 114/71 (84) after standing.     Treatment, including education:  Therapeutic Activity Training:   Therapeutic activity training was instructed today.  Cues were given for safety, sequence, UE/LE placement, awareness, and balance.    Activities performed today included bed mobility training, sup-sit, sit-stand, SPT.    Supine to Sit with Max A with cues for sequencing and  trunk rotation.     Sitting EOB for ~15 min with Max A initially and progressed to SBA.     Scooting hips on EOB and later in recliner with Mod A.     Sit to Stand transfer from EOB with use of FWW for X2 attempts and X1 success on 2nd trial with Mod A X2 with max cues for UE placement.     Static stand at FWW with Mod A X2 for ~1 min with pt demo minimal Wb'ing through R LE d/t pt reports increase in pain with standing.     SPT from EOB <> recliner with Max A X2 with overall 
Occupational Therapy    Occupational Therapy Treatment Note    Name: Westley Rios MRN: [id number] :   1926   Date:  1/10/2024   Admission Date: 2023 Room:  [address] [place] [address]-     D/c recommendation:  SNF    Restrictions/Precautions:          general precautions, fall risk, contact isolation, wound vac     Communication with other providers: RN cleared for therapy, wound care RN's in room requesting assistance transferring pt to bed for bandage change, Pt Nila for safety    Subjective:  Patient states:  \"ouch that hurts\"   Pain:   Location, Type, Intensity (0/10 to 10/10):  pt does not rate pain but notes pain when R LE elevated into bed    Objective:    Observation: pt sitting in chair with wound care upon arrival, agreeable to therapy  Objective Measures:  room air    Treatment, including education:  Therapeutic Activity Training:   Therapeutic activity training was instructed today.  Cues were given for safety, sequence, UE/LE placement, awareness, and balance.      Sit to stand from chair with mod A x 2 for force production and balance safety    Sat EOB with mod A to maintain upright position, mod verbal/tactile cues for improved core stability, B hand held assist on therapist for support    EOB to supine with mod x 2 for trunk control, hip pivot, and LE elevation, min verbal cues for sequencing. Improved LE elevation on this date    Pt educated on role of OT, benefits of OT, and rationale for therapeutic intervention. Educated on need to be patient advocate, assist to fullest ability with ADL completion, and benefits of OOB activity. Pt left in bed with wound care RN's in room, call light within reach, all current needs met    Assessment / Impression:    Patient's tolerance of treatment: Well  Adverse Reaction: None  Significant change in status and impact: Improved from initial evaluation  Barriers to improvement: None noted      Plan for Next Session:    Continue OT POC    Time in:  1011  Time out:  
Occupational Therapy    Occupational Therapy Treatment Note    Name: Westley Riso MRN: [id number] :   1926   Date:  2024   Admission Date: 2023 Room:  [address] [place] [address]     D/c rec:  SNF    Restrictions/Precautions:           fall risk and contact precautions, wound vac     Communication with other providers: RN cleared pt for therapy, Cotx with Nila for safety    Subjective:  Patient states:  \"You never out grow your need for milk\"  Pain:   Location, Type, Intensity (0/10 to 10/10):  denies pain    Objective:    Observation: pt supine in bed upon arrival, agreeable to therapy   Objective Measures:  room air    Treatment, including education:  Therapeutic Activity Training:   Therapeutic activity training was instructed today.  Cues were given for safety, sequence, UE/LE placement, awareness, and balance.      Supine to EOB transfer with mod A for trunk elevation and LE maneuvering, mod verbal/tactile cues    Sat EOB with SBA for balance safety and B hands on bed for support     Sit to stand with mod A x 2 for force production and balance safety, mod verbal cues for body mechanics    1 min standing using 2ww with mod A to maintain upright position, mod verbal cues for body positioning, L lateral lean    Pt educated on role of OT, benefits of OT, and rationale for therapeutic intervention. Educated on need to be patient advocate, assist to fullest ability with ADL completion, and benefits of OOB activity. Pt left in bed, alarm on, call light within reach, all current needs met    Assessment / Impression:    Patient's tolerance of treatment: Well  Adverse Reaction: None  Significant change in status and impact: Improved from initial evaluation  Barriers to improvement: None noted      Plan for Next Session:    Continue OT POC    Time in:  10:33  Time out:  10:51  Timed treatment minutes:  18  Total treatment time:  18      Electronically signed by:    Rosalie Weston OT,   2024, 4:30 PM         
Occupational Therapy  OT attempted to see pt this AM for initial evaluation per CM request. Per chart review and discussion w/ RN, pt underwent I&D this morning and may not be able to tolerate being OOB. Upon entering room, pt resting in bed w/ eyes shut. Pt difficult to arouse and upon waking reported excessive somnolence, requested therapy return tomorrow once he has had a chance to rest following his procedure. Will reattempt to see pt as able.    Micaela Freire OTR/L [id number]  12/29/2023     11:07 AM    
PHARMACY VANCOMYCIN MONITORING SERVICE  Pharmacy consulted by Dr. LEVI Murphy for monitoring and adjustment.    Indication for treatment: Vancomycin indication: SSTI with risk factors   Goal trough: Trough Goal: 10-15 mcg/mL  AUC/ANDREA: <500    Risk Factors for MRSA Identified:   Hospitalization within the past 90 days, Purulent and/or complicated SSTI    Pertinent Laboratory Values:   Temp Readings from Last 3 Encounters:   01/01/24 98.6 °F (37 °C) (Temporal)   12/13/23 97.6 °F (36.4 °C)   12/08/23 98.1 °F (36.7 °C) (Oral)     Recent Labs     12/30/23  1319 12/31/23  0941 01/01/24  0130   WBC 23.1* 16.5* 21.3*       Recent Labs     12/30/23  1319 12/31/23  0941   BUN 25* 20   CREATININE 1.7* 1.4*       Estimated Creatinine Clearance: 30 mL/min (A) (based on SCr of 1.4 mg/dL (H)).    Intake/Output Summary (Last 24 hours) at 1/1/2024 1148  Last data filed at 1/1/2024 1128  Gross per 24 hour   Intake 1300 ml   Output --   Net 1300 ml         Pertinent Cultures:   Date    Source    Results  12/27   Blood    NGTD  12/27   Wound    Strep pyogenes, MRSA  12/29   Wound    Strep pyogenes    Vancomycin level:   TROUGH:  No results for input(s): \"VANCOTROUGH\" in the last 72 hours.  RANDOM:    Recent Labs     12/31/23  0358 01/01/24  0130   VANCORANDOM 13.4 18.8         Assessment:  HPI: 89 year old male with a history of bilateral carotid artery stenosis, hypertension, restless leg syndrome and CAD presented to the ED with right leg wound with purulent discharge. According to patient, the pain in the right leg with accompanying swelling has progressively worsened over the past month. Patient indicated that he was recently prescribed PO antibiotics, but he did not see any improvement in the symptoms. Started on vancomycin, cefepime and clindamycin for abscess of right leg.  Pt underwent I and D with General Surgery.   Renal Function:  Scr at baseline at 1.4, BUN 20  Day(s) of therapy: 6  Vancomycin concentration:   12/29 @ 0826 - 
PHARMACY VANCOMYCIN MONITORING SERVICE  Pharmacy consulted by Dr. LEVI Murphy for monitoring and adjustment.    Indication for treatment: Vancomycin indication: SSTI with risk factors   Goal trough: Trough Goal: 10-15 mcg/mL  AUC/ANDREA: <500    Risk Factors for MRSA Identified:   Hospitalization within the past 90 days, Purulent and/or complicated SSTI    Pertinent Laboratory Values:   Temp Readings from Last 3 Encounters:   01/02/24 97.7 °F (36.5 °C) (Oral)   12/13/23 97.6 °F (36.4 °C)   12/08/23 98.1 °F (36.7 °C) (Oral)     Recent Labs     12/31/23  0941 01/01/24  0130 01/02/24  0359   WBC 16.5* 21.3* 16.9*       Recent Labs     12/30/23  1319 12/31/23  0941 01/02/24  0359   BUN 25* 20 18   CREATININE 1.7* 1.4* 1.5*       Estimated Creatinine Clearance: 31 mL/min (A) (based on SCr of 1.5 mg/dL (H)).    Intake/Output Summary (Last 24 hours) at 1/2/2024 1225  Last data filed at 1/2/2024 1130  Gross per 24 hour   Intake 440 ml   Output 700 ml   Net -260 ml         Pertinent Cultures:   Date    Source    Results  12/27   Blood    NGTD  12/27   Wound    Strep pyogenes, MRSA  12/29   Wound    Strep pyogenes    Vancomycin level:   TROUGH:  No results for input(s): \"VANCOTROUGH\" in the last 72 hours.  RANDOM:    Recent Labs     12/31/23  0358 01/01/24  0130 01/02/24  0359   VANCORANDOM 13.4 18.8 12.9         Assessment:  HPI: 89 year old male with a history of bilateral carotid artery stenosis, hypertension, restless leg syndrome and CAD presented to the ED with right leg wound with purulent discharge. According to patient, the pain in the right leg with accompanying swelling has progressively worsened over the past month. Patient indicated that he was recently prescribed PO antibiotics, but he did not see any improvement in the symptoms. Started on vancomycin, cefepime and clindamycin for abscess of right leg.  ID now consulted.  Pt underwent I and D with General Surgery.   Renal Function:  CKD3, Scr close to baseline at 
PHARMACY VANCOMYCIN MONITORING SERVICE  Pharmacy consulted by Dr. LEVI Murphy for monitoring and adjustment.    Indication for treatment: Vancomycin indication: SSTI with risk factors   Goal trough: Trough Goal: 10-15 mcg/mL  AUC/ANDREA: <500    Risk Factors for MRSA Identified:   Hospitalization within the past 90 days, Purulent and/or complicated SSTI    Pertinent Laboratory Values:   Temp Readings from Last 3 Encounters:   01/03/24 97.5 °F (36.4 °C) (Oral)   12/13/23 97.6 °F (36.4 °C)   12/08/23 98.1 °F (36.7 °C) (Oral)     Recent Labs     01/01/24  0130 01/02/24  0359 01/03/24  1005   WBC 21.3* 16.9* 11.6*       Recent Labs     01/02/24  0359 01/03/24  1005   BUN 18 16   CREATININE 1.5* 1.5*       Estimated Creatinine Clearance: 31 mL/min (A) (based on SCr of 1.5 mg/dL (H)).    Intake/Output Summary (Last 24 hours) at 1/3/2024 1621  Last data filed at 1/3/2024 1030  Gross per 24 hour   Intake 240 ml   Output 800 ml   Net -560 ml         Pertinent Cultures:   Date    Source    Results  12/27   Blood    NGTD  12/27   Wound    Strep pyogenes, MRSA  12/29   Wound    Strep pyogenes    Vancomycin level:   TROUGH:  No results for input(s): \"VANCOTROUGH\" in the last 72 hours.  RANDOM:    Recent Labs     01/01/24  0130 01/02/24  0359   VANCORANDOM 18.8 12.9         Assessment:  HPI: 89 year old male with a history of bilateral carotid artery stenosis, hypertension, restless leg syndrome and CAD presented to the ED with right leg wound with purulent discharge. According to patient, the pain in the right leg with accompanying swelling has progressively worsened over the past month. Patient indicated that he was recently prescribed PO antibiotics, but he did not see any improvement in the symptoms. Started on vancomycin, cefepime and clindamycin for abscess of right leg.  ID now consulted.  Pt underwent I and D with General Surgery.   Renal Function:  CKD3, Scr remains close to baseline at 1.5 (baseline 1.4), BUN normal, UOP 
PHARMACY VANCOMYCIN MONITORING SERVICE  Pharmacy consulted by Dr. LEVI Murphy for monitoring and adjustment.    Indication for treatment: Vancomycin indication: SSTI with risk factors   Goal trough: Trough Goal: 10-15 mcg/mL  AUC/ANDREA: <500    Risk Factors for MRSA Identified:   Hospitalization within the past 90 days, Purulent and/or complicated SSTI    Pertinent Laboratory Values:   Temp Readings from Last 3 Encounters:   01/04/24 97.7 °F (36.5 °C)   12/13/23 97.6 °F (36.4 °C)   12/08/23 98.1 °F (36.7 °C) (Oral)     Recent Labs     01/02/24  0359 01/03/24  1005 01/04/24  0458   WBC 16.9* 11.6* 11.9*       Recent Labs     01/02/24  0359 01/03/24  1005 01/04/24  0458   BUN 18 16 15   CREATININE 1.5* 1.5* 1.6*       Estimated Creatinine Clearance: 26 mL/min (A) (based on SCr of 1.6 mg/dL (H)).    Intake/Output Summary (Last 24 hours) at 1/4/2024 1223  Last data filed at 1/4/2024 0344  Gross per 24 hour   Intake 120 ml   Output 1000 ml   Net -880 ml         Pertinent Cultures:   Date    Source    Results  12/27   Blood    NGTD  12/27   Wound    Strep pyogenes, MRSA  12/29   Wound    Strep pyogenes    Vancomycin level:   TROUGH:  No results for input(s): \"VANCOTROUGH\" in the last 72 hours.  RANDOM:    Recent Labs     01/02/24 0359 01/04/24 0458   VANCORANDOM 12.9 16.6         Assessment:  HPI: 89 year old male with a history of bilateral carotid artery stenosis, hypertension, restless leg syndrome and CAD presented to the ED with right leg wound with purulent discharge. According to patient, the pain in the right leg with accompanying swelling has progressively worsened over the past month. Patient indicated that he was recently prescribed PO antibiotics, but he did not see any improvement in the symptoms. Started on vancomycin, cefepime and clindamycin for abscess of right leg.  ID now consulted.  Pt underwent I and D with General Surgery on 01/01.   Renal Function:  Scr remains stable 1.6 mg/dL today from 1.5 mg/dL 
PHARMACY VANCOMYCIN MONITORING SERVICE  Pharmacy consulted by Dr. LEVI Murphy for monitoring and adjustment.    Indication for treatment: Vancomycin indication: SSTI with risk factors   Goal trough: Trough Goal: 10-15 mcg/mL  AUC/ANDREA: <500    Risk Factors for MRSA Identified:   Hospitalization within the past 90 days, Purulent and/or complicated SSTI    Pertinent Laboratory Values:   Temp Readings from Last 3 Encounters:   01/05/24 97.7 °F (36.5 °C) (Axillary)   12/13/23 97.6 °F (36.4 °C)   12/08/23 98.1 °F (36.7 °C) (Oral)     Recent Labs     01/03/24  1005 01/04/24  0458 01/05/24  0439   WBC 11.6* 11.9* 12.8*       Recent Labs     01/03/24  1005 01/04/24  0458 01/05/24  0439   BUN 16 15 14   CREATININE 1.5* 1.6* 1.5*       Estimated Creatinine Clearance: 28 mL/min (A) (based on SCr of 1.5 mg/dL (H)).  No intake or output data in the 24 hours ending 01/05/24 1431      Pertinent Cultures:   Date    Source    Results  12/27   Blood    NGTD  12/27   Wound    Strep pyogenes, MRSA  12/29   Wound    Strep pyogenes    Vancomycin level:   TROUGH:  No results for input(s): \"VANCOTROUGH\" in the last 72 hours.  RANDOM:    Recent Labs     01/04/24  0458   VANCORANDOM 16.6         Assessment:  HPI: 89 year old male with a history of bilateral carotid artery stenosis, hypertension, restless leg syndrome and CAD presented to the ED with right leg wound with purulent discharge. According to patient, the pain in the right leg with accompanying swelling has progressively worsened over the past month. Patient indicated that he was recently prescribed PO antibiotics, but he did not see any improvement in the symptoms. Started on vancomycin, cefepime and clindamycin for abscess of right leg.  ID now consulted.  Pt underwent I and D with General Surgery on 01/01.   Renal Function:  Scr remains stable 1.5 mg/dL today  BUN stable at 14 today.  Day(s) of therapy: 10 (ID now consulted, attending awaiting recommendations prior to changing 
PHARMACY VANCOMYCIN MONITORING SERVICE  Pharmacy consulted by Dr. LEVI Murphy for monitoring and adjustment.    Indication for treatment: Vancomycin indication: SSTI with risk factors   Goal trough: Trough Goal: 10-15 mcg/mL  AUC/ANDREA: <500    Risk Factors for MRSA Identified:   Hospitalization within the past 90 days, Purulent and/or complicated SSTI    Pertinent Laboratory Values:   Temp Readings from Last 3 Encounters:   01/07/24 97.4 °F (36.3 °C) (Oral)   12/13/23 97.6 °F (36.4 °C)   12/08/23 98.1 °F (36.7 °C) (Oral)     Recent Labs     01/05/24  0439 01/06/24  0458 01/07/24  0040   WBC 12.8* 10.1 8.3       Recent Labs     01/05/24  0439 01/06/24  0458   BUN 14 12   CREATININE 1.5* 1.6*       Estimated Creatinine Clearance: 26 mL/min (A) (based on SCr of 1.6 mg/dL (H)).    Intake/Output Summary (Last 24 hours) at 1/7/2024 1214  Last data filed at 1/7/2024 0800  Gross per 24 hour   Intake 360 ml   Output 500 ml   Net -140 ml         Pertinent Cultures:   Date    Source    Results  12/27   Blood    NGTD  12/27   Wound    Strep pyogenes, MRSA  12/29   Wound    Strep pyogenes    Vancomycin level:   TROUGH:  No results for input(s): \"VANCOTROUGH\" in the last 72 hours.  RANDOM:    Recent Labs     01/06/24 0458   VANCORANDOM 11.0         Assessment:  HPI: 89 year old male with a history of bilateral carotid artery stenosis, hypertension, restless leg syndrome and CAD presented to the ED with right leg wound with purulent discharge. According to patient, the pain in the right leg with accompanying swelling has progressively worsened over the past month. Patient indicated that he was recently prescribed PO antibiotics, but he did not see any improvement in the symptoms. Started on vancomycin, cefepime and clindamycin for abscess of right leg.  ID now consulted.  Pt underwent I and D with General Surgery on 01/01.   Renal Function:  No repeat labs today  Day(s) of therapy: 12 (ID now consulted; continue vancomycin while 
PHARMACY VANCOMYCIN MONITORING SERVICE  Pharmacy consulted by Dr. LEVI Murphy for monitoring and adjustment.    Indication for treatment: Vancomycin indication: SSTI with risk factors   Goal trough: Trough Goal: 10-15 mcg/mL  AUC/ANDREA: <500    Risk Factors for MRSA Identified:   Hospitalization within the past 90 days, Purulent and/or complicated SSTI    Pertinent Laboratory Values:   Temp Readings from Last 3 Encounters:   12/27/23 97.8 °F (36.6 °C) (Oral)   12/13/23 97.6 °F (36.4 °C)   12/08/23 98.1 °F (36.7 °C) (Oral)     Recent Labs     12/26/23  0652 12/27/23  2240 12/28/23  0032   WBC 24.7* 23.6* 23.7*     Recent Labs     12/26/23  0652 12/27/23  2240   BUN 48* 42*   CREATININE 2.7* 2.1*     Estimated Creatinine Clearance: 20 mL/min (A) (based on SCr of 2.1 mg/dL (H)).  No intake or output data in the 24 hours ending 12/28/23 0547    Pertinent Cultures:   Date    Source    Results  12/27   Blood    In process  12/27   Wound    In process    Vancomycin level:   TROUGH:  No results for input(s): \"VANCOTROUGH\" in the last 72 hours.  RANDOM:  No results for input(s): \"VANCORANDOM\" in the last 72 hours.    Assessment:  HPI: 89 year old male with a history of bilateral carotid artery stenosis, hypertension, restless leg syndrome and CAD presented to the ED with right leg wound with purulent discharge. According to patient, the pain in the right leg with accompanying swelling has progressively worsened over the past month. Patient indicated that he was recently prescribed PO antibiotics, but he did not see any improvement in the symptoms. Started on vancomycin, cefepime and clindamycin for abscess of right leg.  SCr, BUN, and urine output: CKD III, Scr = 2.1 (at baseline), No I/O data  Day(s) of therapy: 1 of 5  Vancomycin concentration: To be collected    Plan:  Vancomycin 2000 mg x 1  Intermittent dosing based on levels due to likely poor renal clearance  Pharmacy will continue to monitor patient and adjust therapy 
PHARMACY VANCOMYCIN MONITORING SERVICE  Pharmacy consulted by Dr. LEVI Murphy for monitoring and adjustment.    Indication for treatment: Vancomycin indication: SSTI with risk factors   Goal trough: Trough Goal: 10-15 mcg/mL  AUC/ANDREA: <500    Risk Factors for MRSA Identified:   Hospitalization within the past 90 days, Purulent and/or complicated SSTI    Pertinent Laboratory Values:   Temp Readings from Last 3 Encounters:   12/29/23 97.6 °F (36.4 °C) (Oral)   12/13/23 97.6 °F (36.4 °C)   12/08/23 98.1 °F (36.7 °C) (Oral)     Recent Labs     12/28/23  0032 12/28/23  0630 12/29/23  0826   WBC 23.7* 20.6* 17.8*       Recent Labs     12/27/23  2240 12/28/23  0630 12/29/23  0826   BUN 42* 41* 30*   CREATININE 2.1* 2.2* 1.9*       Estimated Creatinine Clearance: 24 mL/min (A) (based on SCr of 1.9 mg/dL (H)).    Intake/Output Summary (Last 24 hours) at 12/29/2023 1219  Last data filed at 12/29/2023 0915  Gross per 24 hour   Intake 310 ml   Output 50 ml   Net 260 ml       Pertinent Cultures:   Date    Source    Results  12/27   Blood    In process  12/27   Wound    In process    Vancomycin level:   TROUGH:  No results for input(s): \"VANCOTROUGH\" in the last 72 hours.  RANDOM:    Recent Labs     12/29/23  0826   VANCORANDOM 9.3       Assessment:  HPI: 89 year old male with a history of bilateral carotid artery stenosis, hypertension, restless leg syndrome and CAD presented to the ED with right leg wound with purulent discharge. According to patient, the pain in the right leg with accompanying swelling has progressively worsened over the past month. Patient indicated that he was recently prescribed PO antibiotics, but he did not see any improvement in the symptoms. Started on vancomycin, cefepime and clindamycin for abscess of right leg.  Pt underwent I and D with General Surgery.   Renal Function:  Scr 1.9 today, from 2.2 yesterday.  BUN 30 today from 41 yesterday.  No urine output documented.  Day(s) of therapy: 3 of 
PHARMACY VANCOMYCIN MONITORING SERVICE  Pharmacy consulted by Dr. LEVI Murphy for monitoring and adjustment.    Indication for treatment: Vancomycin indication: SSTI with risk factors   Goal trough: Trough Goal: 10-15 mcg/mL  AUC/ANDREA: <500    Risk Factors for MRSA Identified:   Hospitalization within the past 90 days, Purulent and/or complicated SSTI    Pertinent Laboratory Values:   Temp Readings from Last 3 Encounters:   12/31/23 98 °F (36.7 °C) (Oral)   12/13/23 97.6 °F (36.4 °C)   12/08/23 98.1 °F (36.7 °C) (Oral)     Recent Labs     12/29/23  0826 12/30/23  1319 12/31/23  0941   WBC 17.8* 23.1* 16.5*       Recent Labs     12/29/23  0826 12/30/23  1319 12/31/23  0941   BUN 30* 25* 20   CREATININE 1.9* 1.7* 1.4*       Estimated Creatinine Clearance: 30 mL/min (A) (based on SCr of 1.4 mg/dL (H)).    Intake/Output Summary (Last 24 hours) at 12/31/2023 1046  Last data filed at 12/30/2023 1940  Gross per 24 hour   Intake 401 ml   Output 550 ml   Net -149 ml         Pertinent Cultures:   Date    Source    Results  12/27   Blood    NGTD  12/27   Wound    Strep pyogenes, MRSA  12/29   Wound    Strep pyogenes    Vancomycin level:   TROUGH:  No results for input(s): \"VANCOTROUGH\" in the last 72 hours.  RANDOM:    Recent Labs     12/29/23  0826 12/31/23  0358   VANCORANDOM 9.3 13.4         Assessment:  HPI: 89 year old male with a history of bilateral carotid artery stenosis, hypertension, restless leg syndrome and CAD presented to the ED with right leg wound with purulent discharge. According to patient, the pain in the right leg with accompanying swelling has progressively worsened over the past month. Patient indicated that he was recently prescribed PO antibiotics, but he did not see any improvement in the symptoms. Started on vancomycin, cefepime and clindamycin for abscess of right leg.  Pt underwent I and D with General Surgery.   Renal Function:  Scr at baseline at 1.4, BUN 20  Day(s) of therapy: 5  Vancomycin 
Patient anti-Xa 0.59. No change to heparin drip.Heparin drip continues at 17 units/kg/hr. Next anti-XA 0345.  
Patient had debridement today with Dr Pacheco. This nurse notified Dr Bermudez who was on call about patients large amount of bleeding coming from patients right leg. He was also notified that patient is on a heparin drip.    This nurse was told to reinforce dressing. H/H ordered stat. Vitals stable.  
Patient spit out two medications and voice \"don't want them\". Green board placed on left arm with ace bandage due to patient continue to bend arm with iv sounding.   
Per Lab, They will not perform blood draw while patient is getting blood transfusion. Ant-Xa not complete for this reason.   
Physical and Occupational Therapy  Physical and occupational therapy attempted to see pt today for evaluation. Hold therapy eval at this time per RN request d/t low hemoglobin. Will re-attempt evaluation at a later time.     
Pt being taken to [facility] home by daughter and [facility]. Belongings and discharge paperwork given to daughter. Went over d/c instructions w patient and daughter, verbalized understanding. This RN notified daughter that paperwork and Rx sent w patient are to be given to staff at [facility]. Pt escorted out via wheelchair and was helped into the car by staff.   
RENAL DOSE ADJUSTMENT MADE PER P/T PROTOCOL    PREVIOUS ORDER:  Cefepime 2000 mg q12h    Indication: Skin and soft tissue infection    Estimated Creatinine Clearance: 20 mL/min (A) (based on SCr of 2.1 mg/dL (H)).  Recent Labs     12/26/23  0652 12/27/23  2240 12/28/23  0032   BUN 48* 42*  --    CREATININE 2.7* 2.1*  --     364 385   INR  --   --  1.3     NEW RENALLY ADJUSTED ORDER:  Cefepime 1000 mg q24h [Extended infusion]    Juanjo Connelly [facility]  12/28/2023 5:38 AM      
RN discussed with the patient the rationale for blood component transfusion; its benefits in treating or preventing fatigue, organ damage, or death; and its risk which includes mild transfusion reactions, rare risk of blood borne infection, or more serious but rare reactions. RN discussed the alternatives to transfusion, including the risk and consequences of not receiving transfusion. The patient had an opportunity to ask questions to remote NP  and had agreed to proceed with transfusion of blood components.    
RRT called overhead and upon arrival multiple staff members at bedside. Patient with blood on the sheets, gown, and himselft. He is laying on his side. IV that had been in his left AC noted out with blood soaked gauze and dressing in place. Heparin drip no longer connected as IV had been removed. When attempting to roll patient to his back in order to assess him he became agitated. He is easily redirectable. When asked orientation questions he is able to state his name and that he is in [facility]. Hospitalist at bedside stated to hold heparin until labwork drawn and resulted to assess for hypercoagulation. Labs drawn and patient cleaned up per bedside staff. Please don't hesitate to call or utilize [employer] messaging for Rapid Resource RN should any further needs or concerns arise.      Bernardo Howell   Rapid Resouce RN   [facility] [phone]    
Report called to RUCHI Olvera at [place]. Notified them that daughter is providing transport, will be here at 1630 and they will need help once pt arrives to facility.   
TRANSFER - OUT REPORT:    Verbal report given to Sammi RN(name) on Westley Rios being transferred to [facility](unit) for routine post-op       Report consisted of patient's Situation, Background, Assessment and   Recommendations(SBAR).     Information from the following report(s) Nurse Handoff Report was reviewed with the receiving nurse.    Opportunity for questions and clarification was provided.      Patient transported with:   Tech    
(includes body structures/functions, activity/participation limitations):  Observation: sitting EOB upon arrival, agreeable to therapy   Vision:  WFL  Hearing:  Rampart, hearing aid  Cardiopulmonary:  On room air, tele, vitals remained stable throughout session       Body Systems and functions:  ROM R/L:  WFL    Strength R/L:  RUE 4-/5,  LUE 4-/5,  4/5  Sensation: WFL  Tone: Normal  Coordination: WFL  Perception: WNL    Pt body function inferred from observation of gross motor function, and assessment of mobility, balance, posture, ADL performance, and activity tolerance.    Activities of Daily Living (ADLs):  Feeding: set-up A  Grooming: minimal assist  UB bathing: minimal assist  LB bathing: maximal assist  UB dressing: minimal assist  LB dressing: maximal assist  Toileting: maximal assist    Pt ADL function inferred from observation of gross motor function, and assessment of mobility, balance, posture, safety awareness, cognition, and activity tolerance.     AM-PAC 6 click short form for inpatient daily activity:   How much help from another person does the patient currently need... Unable  Dep A Lot  Max A A Lot   Mod A A Little  Min A A Little   CGA  SBA None   Mod I  Indep  Sup   1.  Putting on and taking off regular lower body clothing? [] 1    [x] 2   [] 2   [] 3   [] 3   [] 4      2. Bathing (including washing, rinsing, drying)? [] 1   [] 2   [x] 2 [] 3 [] 3 [] 4   3. Toileting, which includes using toilet, bedpan, or urinal? [] 1    [x] 2   [] 2   [] 3   [] 3   [] 4     4. Putting on and taking off regular upper body clothing? [] 1   [] 2   [] 2   [x] 3   [] 3    [] 4      5. Taking care of personal grooming such as brushing teeth? [] 1   [] 2    [] 2 [x] 3    [] 3   [] 4      6. Eating meals?   [] 1   [] 2   [] 2   [] 3   [] 3   [x] 4      Raw Score:  16    [24=0% impaired(CH), 23=1-19%(CI), 20-22=20-39%(CJ), 15-19=40-59%(CK), 10-14=60-79%(CL), 7-9=80-99%(CM), 6=100%(CN)]     Cognitive and Psychosocial 
:Min A x 2 to EOB. Patient needs cues for BUE effort for eccentric control  Standing balance:Patient requires Mod A x 2 for static standing. He demos decrease hip, knee and trunk extension in stance.    Neuro-roshan: Verbal and tactile cues for standing upright posture. Manual assist needed for trunk adjustment to midline.  SPT:Squat pivot with Max A x 2, slow pacing and sequence    Safety  Patient left safely in the recliner, with call light/phone in reach with alarm applied. Gait belt used for transfers and gait.    Assessment / Impression:     Patient's tolerance of treatment:  Good, patient need added time to complete activities and process cues. Mobility causes fatigue and he required 3-5 minute rest breaks between transfers for energy conservation   Adverse Reaction: fatigue, SOB, pain  Significant change in status and impact:  tolerates transfer to recliner today   Barriers to improvement:  medical status, strength, endurance     Plan for Next Session:    Will cont to work towards pt's goals per patient tolerance  Time in:  0847  Time out:  0940  Timed treatment minutes:  43  Total treatment time:  53  Previously filed items:  Social/Functional History  Lives With: Spouse  Type of Home: Assisted living  Home Layout: One level  Home Access: Level entry  Bathroom Shower/Tub: Walk-in shower  Bathroom Toilet: Handicap height  Bathroom Equipment: Grab bars in shower, Shower chair  Bathroom Accessibility: Accessible  Home Equipment: Rollator, Cane  Has the patient had two or more falls in the past year or any fall with injury in the past year?: No  Receives Help From: Family  ADL Assistance: Independent  Homemaking Assistance: Independent (wife primarly performs IADLS)  Ambulation Assistance: Independent (use 4ww/SPC)  Transfer Assistance: Independent  Active : Yes  Mode of Transportation: Car  Short Term Goals  Time Frame for Short Term Goals: 1 week  Short Term Goal 1: Pt will complete bed mobility Min 
at 12 today.  Day(s) of therapy: 11 (ID now consulted; continue vancomycin while inpatient with plans for PO Zyvox upon d/c)  Vancomycin concentration:   12/29 @ 0826 - 9.3 after loading dose given late PM on 12/27. ~ 36 hr post dose level  12/31: 13.4   1/1: 18.8  1/2 - 12.9, 40 hour level post 1250mg dose, ok to re-dose  01/04 - 16.2 mg/L, will redose  01/06 - 11 mg/L, 38h level, appropriate to redose    Plan:  Patient with CKD. Will continue to dose intermittently.   Based on level, plan to re-dose with 1000 mg x1  Repeat a random level in ~36h  Pharmacy will continue to monitor patient and adjust therapy as indicated    VANCOMYCIN CONCENTRATION SCHEDULED FOR 1/8 @ 0600    Thank you for the consult,  Aurora Muñoz, [facility], PharmD                
conservative management     CAD  -Continue aspirin, zetia, hold BB due to sepsis      HTN  -Hold BB       RLS  -Continue requip        Diet Diet NPO Exceptions are: Sips of Water with Meds   DVT Prophylaxis [] Lovenox, [x]  Heparin, [] SCDs, [] Ambulation,  [] Eliquis, [] Xarelto  [] Coumadin   Code Status Full Code   Disposition From: [place]  Expected Disposition: SNF  Estimated Date of Discharge: 2 to 3 days  Patient requires continued admission due to pending culture results   Surrogate Decision Maker/ POA Child Elizabeth     Personally reviewed Lab Studies and Imaging     Drugs that require monitoring for toxicity include Vanc and the method of monitoring was Cr    Discussed with general surgery who recommended may need repeat OR for washout if persistent bleeding from the surgical site.    Subjective:     Chief Complaint:   Chief Complaint   Patient presents with    Cellulitis     Right lower leg       Reports he feels okay.  More awake this morning.  Denies any nausea, vomiting, chest pain, shortness of breath.    RN report of bleeding from the surgery site overnight and the general surgery was notified   Review of Systems:      Pertinent positives and negatives discussed in HPI    Objective:     Intake/Output Summary (Last 24 hours) at 12/30/2023 0945  Last data filed at 12/30/2023 0356  Gross per 24 hour   Intake 1256.94 ml   Output 525 ml   Net 731.94 ml        Vitals:   Vitals:    12/30/23 0000 12/30/23 0356 12/30/23 0432 12/30/23 0741   BP: (!) 115/58 (!) 137/91     Pulse: 76 69  69   Resp: 17 14  15   Temp: 97.9 °F (36.6 °C) 97.5 °F (36.4 °C)     TempSrc: Oral Oral     SpO2: 99% 100%  93%   Weight:   80.3 kg (177 lb 0.5 oz)    Height:             Physical Exam:      General: NAD, ill-appearing  Eyes: EOMI  ENT: neck supple  Cardiovascular: Regular rate.  Regular rhythm  Respiratory: Clear to auscultation  Gastrointestinal: Soft, non tender  Genitourinary: no suprapubic tenderness  Musculoskeletal: No 
documented.  Day(s) of therapy: 4   Vancomycin concentration:   12/29 @ 0826 - 9.3 after loading dose given late PM on 12/27. ~ 36 hr post dose level    Plan:  Will continue dosing intermittently of of levels for now. Awaiting susceptibilities to narrow- hopeful for PO options to narrow tomorrow.  Patient received vanco 1250mg IV x 1 12/29 ~ 1200. Level for this AM not collected. Will reorder for tomorrow. Based on previous 36 hr post dose level, level tomorrow is reasonable.   Pharmacy will continue to monitor patient and adjust therapy as indicated    VANCOMYCIN CONCENTRATION SCHEDULED FOR 12/31 @0600    Thank you for the consult.  Aleida Verma, PharmD, BCCCP          
- unselect if not a suspected or confirmed emergency medical condition->Emergency Medical Condition (MA) Reason for Exam: eval for nec fasc. FINDINGS: Bones: The bones are osteopenic which may be secondary to the age.  There is lateral patellar tilt and subluxation but without dislocation. Osteoarthritic changes and enthesopathy along the patella but no fracture or destruction.  There is no cortical erosion along the femoral condyles or proximal tibia.  Hypertrophy of the tibial spines and marginal osteophytes along the articular margins are present from osteoarthritis. No acute fracture of the tibia and fibula.  No dislocation of the ankle.  No cortical erosion or periosteal reaction are identified. Soft Tissue: Small joint effusion at the knee likely with the osteoarthritis. There is a crescentic fluid collection along the posterior aspect of the upper calf and knee superficial to the musculature.  Thickness of up to 1.5 cm and a length of involvement of 10 cm.  Lack of an contrast administration limits evaluation for the enhancement characteristics of the margin.  Skin thickening overlying the posterior aspect of the calf.  A lesser component of the collection continues inferiorly below the described margin down to the mid to distal posterior calf.  Skin thickening at the site and a few small foci of gas on series 2 image 99 and 103 and 108. Surface deformity along the anteromedial aspect of the lower leg at the midportion skin thickening and surface debris.  Small area of ulceration and gas along the superficial aspect but not penetrating into the deeper soft tissues at this site. Generalized edema in the subcutaneous fat extending down the calf and to the level of the ankle.  Skin thickening around the ankle and into the dorsum of the foot.  No focal fluid collection or gas within the muscular compartment of the lower leg. Heavy arterial calcification in the calf arteries.  Scattered venous phleboliths in the 
COMPARISON: None. HISTORY ORDERING SYSTEM PROVIDED HISTORY: eval for nec fasc. TECHNOLOGIST PROVIDED HISTORY: Reason for exam:->eval for nec fasc. Decision Support Exception - unselect if not a suspected or confirmed emergency medical condition->Emergency Medical Condition (MA) Reason for Exam: eval for nec fasc. FINDINGS: Bones: The bones are osteopenic which may be secondary to the age.  There is lateral patellar tilt and subluxation but without dislocation. Osteoarthritic changes and enthesopathy along the patella but no fracture or destruction.  There is no cortical erosion along the femoral condyles or proximal tibia.  Hypertrophy of the tibial spines and marginal osteophytes along the articular margins are present from osteoarthritis. No acute fracture of the tibia and fibula.  No dislocation of the ankle.  No cortical erosion or periosteal reaction are identified. Soft Tissue: Small joint effusion at the knee likely with the osteoarthritis. There is a crescentic fluid collection along the posterior aspect of the upper calf and knee superficial to the musculature.  Thickness of up to 1.5 cm and a length of involvement of 10 cm.  Lack of an contrast administration limits evaluation for the enhancement characteristics of the margin.  Skin thickening overlying the posterior aspect of the calf.  A lesser component of the collection continues inferiorly below the described margin down to the mid to distal posterior calf.  Skin thickening at the site and a few small foci of gas on series 2 image 99 and 103 and 108. Surface deformity along the anteromedial aspect of the lower leg at the midportion skin thickening and surface debris.  Small area of ulceration and gas along the superficial aspect but not penetrating into the deeper soft tissues at this site. Generalized edema in the subcutaneous fat extending down the calf and to the level of the ankle.  Skin thickening around the ankle and into the dorsum of the foot. 
[facility]     Labs:    Recent Results (from the past 24 hour(s))   Procalcitonin    Collection Time: 01/04/24  1:02 PM   Result Value Ref Range    Procalcitonin 0.100    C-Reactive Protein    Collection Time: 01/04/24  1:02 PM   Result Value Ref Range    CRP High Sensitivity 9.3 (H) <5.0 mg/L   Anti-Xa, Unfractionated Heparin    Collection Time: 01/04/24  1:02 PM   Result Value Ref Range    Anti-XA Unfrac Heparin <0.10 (L) 0.30 - 0.70 IU/mL   Anti-Xa, Unfractionated Heparin    Collection Time: 01/04/24 10:49 PM   Result Value Ref Range    Anti-XA Unfrac Heparin 0.40 0.30 - 0.70 IU/mL   Anti-Xa, Unfractionated Heparin    Collection Time: 01/05/24  4:39 AM   Result Value Ref Range    Anti-XA Unfrac Heparin 0.41 0.30 - 0.70 IU/mL   Basic Metabolic Panel w/ Reflex to MG    Collection Time: 01/05/24  4:39 AM   Result Value Ref Range    Sodium 138 135 - 145 MMOL/L    Potassium 4.5 3.5 - 5.1 MMOL/L    Chloride 106 99 - 110 mMol/L    CO2 24 21 - 32 MMOL/L    Anion Gap 8 7 - 16    Glucose 117 (H) 70 - 99 MG/DL    BUN 14 6 - 23 MG/DL    Creatinine 1.5 (H) 0.9 - 1.3 MG/DL    Est, Glom Filt Rate 42 (L) >60 mL/min/1.73m2    Calcium 7.5 (L) 8.3 - 10.6 MG/DL   CBC with Auto Differential    Collection Time: 01/05/24  4:39 AM   Result Value Ref Range    WBC 12.8 (H) 4.0 - 10.5 K/CU MM    RBC 2.54 (L) 4.6 - 6.2 M/CU MM    Hemoglobin 7.7 (L) 13.5 - 18.0 GM/DL    Hematocrit 25.7 (L) 42 - 52 %    .2 (H) 78 - 100 FL    MCH 30.3 27 - 31 PG    MCHC 30.0 (L) 32.0 - 36.0 %    RDW 19.5 (H) 11.7 - 14.9 %    Platelets 257 140 - 440 K/CU MM    MPV 9.6 7.5 - 11.1 FL    Metamyelocytes Relative 1 (H) 0.0 %    Segs Relative 80.0 (H) 36 - 66 %    Eosinophils % 1.0 0 - 3 %    Lymphocytes % 15.0 (L) 24 - 44 %    Monocytes % 3.0 0 - 4 %    Metamyelocytes Absolute 0.13 K/CU MM    Segs Absolute 10.3 K/CU MM    Eosinophils Absolute 0.1 K/CU MM    Lymphocytes Absolute 1.9 K/CU MM    Monocytes Absolute 0.4 K/CU MM    Differential Type MANUAL 
placement.  PLAN:  Retrieval intent (MIPS): The filter is potentially retrievable.Plan/Timing For Retrieval: Within 175 days. Ordering Physician/Contact For Retrieval Plan: AYDEN MEEHAN     Labs:    Recent Results (from the past 24 hour(s))   Anti-Xa, Unfractionated Heparin    Collection Time: 01/08/24  9:20 PM   Result Value Ref Range    Anti-XA Unfrac Heparin 0.35 0.30 - 0.70 IU/mL   Basic Metabolic Panel w/ Reflex to MG    Collection Time: 01/09/24  3:00 AM   Result Value Ref Range    Sodium 137 135 - 145 MMOL/L    Potassium 4.5 3.5 - 5.1 MMOL/L    Chloride 104 99 - 110 mMol/L    CO2 21 21 - 32 MMOL/L    Anion Gap 12 7 - 16    Glucose 103 (H) 70 - 99 MG/DL    BUN 10 6 - 23 MG/DL    Creatinine 1.5 (H) 0.9 - 1.3 MG/DL    Est, Glom Filt Rate 42 (L) >60 mL/min/1.73m2    Calcium 7.9 (L) 8.3 - 10.6 MG/DL   Anti-Xa, Unfractionated Heparin    Collection Time: 01/09/24  3:00 AM   Result Value Ref Range    Anti-XA Unfrac Heparin 0.69 0.30 - 0.70 IU/mL   CBC with Auto Differential    Collection Time: 01/09/24 10:46 AM   Result Value Ref Range    WBC 7.4 4.0 - 10.5 K/CU MM    RBC 2.84 (L) 4.6 - 6.2 M/CU MM    Hemoglobin 8.3 (L) 13.5 - 18.0 GM/DL    Hematocrit 28.6 (L) 42 - 52 %    .7 (H) 78 - 100 FL    MCH 29.2 27 - 31 PG    MCHC 29.0 (L) 32.0 - 36.0 %    RDW 21.0 (H) 11.7 - 14.9 %    Platelets 227 140 - 440 K/CU MM    MPV 9.8 7.5 - 11.1 FL    Differential Type AUTOMATED DIFFERENTIAL     Segs Relative 67.8 (H) 36 - 66 %    Lymphocytes % 18.5 (L) 24 - 44 %    Monocytes % 8.3 (H) 0 - 4 %    Eosinophils % 3.1 (H) 0 - 3 %    Basophils % 1.4 (H) 0 - 1 %    Segs Absolute 5.0 K/CU MM    Lymphocytes Absolute 1.4 K/CU MM    Monocytes Absolute 0.6 K/CU MM    Eosinophils Absolute 0.2 K/CU MM    Basophils Absolute 0.1 K/CU MM    Nucleated RBC % 0.0 %    Total Nucleated RBC 0.0 K/CU MM    Total Immature Neutrophil 0.07 K/CU MM    Immature Neutrophil % 0.9 (H) 0 - 0.43 %     CULTURE results: Invalid input(s): \"BLOOD 
thickening at the site and a few small foci of gas on series 2 image 99 and 103 and 108. Surface deformity along the anteromedial aspect of the lower leg at the midportion skin thickening and surface debris.  Small area of ulceration and gas along the superficial aspect but not penetrating into the deeper soft tissues at this site. Generalized edema in the subcutaneous fat extending down the calf and to the level of the ankle.  Skin thickening around the ankle and into the dorsum of the foot.  No focal fluid collection or gas within the muscular compartment of the lower leg. Heavy arterial calcification in the calf arteries.  Scattered venous phleboliths in the superficial tissues as well.     1.  Crescentic fluid collection in the subcutaneous tissues overlying the posterior musculature running from the level of the knee down the calf to the mid to distal portion.  Skin thickening and there are a few small foci of gas in the subcutaneous tissues and edge of the fluid collection on series 2, images 99, 103, and 108.  Could relate to small gas forming infection and or focal penetrating area at this region.  However there is not general soft tissue emphysema tracking away from this site or along the main portion of the fluid collection. 2.  Surface irregularity and defect along the anteromedial aspect of the lower leg likely with surface wound/ulcer.  No soft tissue gas into the deeper subcutaneous tissues at this area. 3.  General subcutaneous edema of the lower leg and around the ankle into the foot.  Skin thickening and cellulitis are present at the calf and shin. 4.  No fluid collection in muscular compartment.  Heavy arterial calcifications are present.     Vascular duplex lower extremity venous bilateral    Result Date: 12/28/2023  EXAMINATION: DUPLEX VENOUS ULTRASOUND OF THE BILATERAL LOWER [id number]/27/2023 11:40 pm TECHNIQUE: Duplex ultrasound using B-mode/gray scaled imaging and Doppler spectral analysis 
12/28/23  0630 12/29/23  0826   WBC 23.7* 20.6* 17.8*   HGB 9.0* 7.9* 8.6*    359 356     BMP:    Recent Labs     12/27/23 2240 12/28/23  0630 12/29/23  0826    137 137   K 3.5 3.2* 4.1   CL 96* 99 102   CO2 24 27 23   BUN 42* 41* 30*   CREATININE 2.1* 2.2* 1.9*   GLUCOSE 128* 117* 109*     Hepatic:   Recent Labs     12/27/23 2240 12/28/23  0630 12/29/23  0826   AST 26 23 29   ALT 14 13 14   BILITOT 0.8 0.7 0.6   ALKPHOS 143* 123 111     Lipids:   Lab Results   Component Value Date/Time    CHOL 181 12/09/2021 12:00 AM    HDL 54 12/09/2021 12:00 AM    TRIG 181 12/09/2021 12:00 AM     Troponin:   Lab Results   Component Value Date/Time    TROPONINT <0.010 08/27/2021 01:00 PM       UA:  Lab Results   Component Value Date/Time    NITRU NEGATIVE 11/23/2023 10:02 PM    NITRU Negative 04/06/2022 02:11 PM    COLORU YELLOW 11/23/2023 10:02 PM    PHUR 6.0 04/06/2022 02:11 PM    LABCAST None seen 04/06/2022 02:11 PM    LABCAST CANCELED 04/06/2022 02:11 PM    WBCUA 5 11/23/2023 10:02 PM    RBCUA 1 11/23/2023 10:02 PM    MUCUS RARE 11/23/2023 10:02 PM    TRICHOMONAS NONE SEEN 11/23/2023 10:02 PM    YEAST CANCELED 04/06/2022 02:11 PM    BACTERIA NEGATIVE 11/23/2023 10:02 PM    CLARITYU CLEAR 11/23/2023 10:02 PM    SPECGRAV 1.010 11/23/2023 10:02 PM    LEUKOCYTESUR TRACE 11/23/2023 10:02 PM    UROBILINOGEN 0.2 11/23/2023 10:02 PM    BILIRUBINUR NEGATIVE 11/23/2023 10:02 PM    BLOODU NEGATIVE 11/23/2023 10:02 PM    GLUCOSEU Negative 04/06/2022 02:11 PM    KETUA NEGATIVE 11/23/2023 10:02 PM           Electronically signed by Carlos Snow MD on 12/29/2023 at 9:42 AM  
TECHNIQUE: Duplex ultrasound using B-mode/gray scaled imaging and Doppler spectral analysis and color flow was obtained of the deep venous structures of the bilateral extremities. COMPARISON: 01/01/2020 HISTORY: ORDERING SYSTEM PROVIDED HISTORY: r/o dvt FINDINGS: The right common femoral and greater saphenous veins are patent.  There is intraluminal echogenic material with abnormal compression and lack of Doppler flow within the right profundus femoris, femoral, popliteal, and posterior tibial veins.  There is also nonocclusive thrombus noted within the right peroneal vein. There is normal compression, augmentation, and Doppler flow within the left common femoral, femoral, and popliteal veins.  The origins of the left greater saphenous and profundus femoris veins are patent.  The visualized calf veins demonstrate no evidence of thrombus.     1. Occlusive DVT noted throughout the right femoral, popliteal, and posterior tibial veins.  Nonocclusive thrombus is also noted within the right peroneal vein, as well as superficial thrombus within the right profundus femoris vein. 2. No evidence of DVT within the visualized left lower extremity.       CBC:   Recent Labs     01/06/24  0458 01/07/24  0040 01/07/24  1627 01/08/24  0353   WBC 10.1 8.3  --  7.5   HGB 7.6* 6.8* 7.9* 8.4*    230  --  231     BMP:    Recent Labs     01/06/24  0458 01/07/24  0040 01/08/24  0353    143 138   K 4.4 4.4 4.4    106 105   CO2 22 22 23   BUN 12 12 10   CREATININE 1.6* 1.4* 1.5*   GLUCOSE 102* 100* 88     Hepatic:   No results for input(s): \"AST\", \"ALT\", \"ALB\", \"BILITOT\", \"ALKPHOS\" in the last 72 hours.    Lipids:   Lab Results   Component Value Date/Time    CHOL 181 12/09/2021 12:00 AM    HDL 54 12/09/2021 12:00 AM    TRIG 181 12/09/2021 12:00 AM     Troponin:   Lab Results   Component Value Date/Time    TROPONINT <0.010 08/27/2021 01:00 PM       UA:  Lab Results   Component Value Date/Time    NITRU NEGATIVE 11/23/2023 
lack of Doppler flow within the right profundus femoris, femoral, popliteal, and posterior tibial veins.  There is also nonocclusive thrombus noted within the right peroneal vein. There is normal compression, augmentation, and Doppler flow within the left common femoral, femoral, and popliteal veins.  The origins of the left greater saphenous and profundus femoris veins are patent.  The visualized calf veins demonstrate no evidence of thrombus.     1. Occlusive DVT noted throughout the right femoral, popliteal, and posterior tibial veins.  Nonocclusive thrombus is also noted within the right peroneal vein, as well as superficial thrombus within the right profundus femoris vein. 2. No evidence of DVT within the visualized left lower extremity.       CBC:   Recent Labs     12/31/23  0941 12/31/23  1842 01/01/24  0130 01/02/24  0359   WBC 16.5*  --  21.3* 16.9*   HGB 5.0* 8.4* 7.5* 6.4*     --  250 272     BMP:    Recent Labs     12/31/23  0941 01/02/24  0359   * 137   K 3.9 4.2    105   CO2 24 22   BUN 20 18   CREATININE 1.4* 1.5*   GLUCOSE 97 93     Hepatic:   Recent Labs     12/31/23  0941   AST 13*   ALT <5*   BILITOT 0.6   ALKPHOS 74     Lipids:   Lab Results   Component Value Date/Time    CHOL 181 12/09/2021 12:00 AM    HDL 54 12/09/2021 12:00 AM    TRIG 181 12/09/2021 12:00 AM     Troponin:   Lab Results   Component Value Date/Time    TROPONINT <0.010 08/27/2021 01:00 PM       UA:  Lab Results   Component Value Date/Time    NITRU NEGATIVE 11/23/2023 10:02 PM    NITRU Negative 04/06/2022 02:11 PM    COLORU YELLOW 11/23/2023 10:02 PM    PHUR 6.0 04/06/2022 02:11 PM    LABCAST None seen 04/06/2022 02:11 PM    LABCAST CANCELED 04/06/2022 02:11 PM    WBCUA 5 11/23/2023 10:02 PM    RBCUA 1 11/23/2023 10:02 PM    MUCUS RARE 11/23/2023 10:02 PM    TRICHOMONAS NONE SEEN 11/23/2023 10:02 PM    YEAST CANCELED 04/06/2022 02:11 PM    BACTERIA NEGATIVE 11/23/2023 10:02 PM    CLARITYU CLEAR 11/23/2023 10:02 
popliteal, and posterior tibial veins.  There is also nonocclusive thrombus noted within the right peroneal vein. There is normal compression, augmentation, and Doppler flow within the left common femoral, femoral, and popliteal veins.  The origins of the left greater saphenous and profundus femoris veins are patent.  The visualized calf veins demonstrate no evidence of thrombus.     1. Occlusive DVT noted throughout the right femoral, popliteal, and posterior tibial veins.  Nonocclusive thrombus is also noted within the right peroneal vein, as well as superficial thrombus within the right profundus femoris vein. 2. No evidence of DVT within the visualized left lower extremity.       CBC:   Recent Labs     01/01/24  0130 01/02/24  0359 01/02/24  1537 01/03/24  1005   WBC 21.3* 16.9*  --  11.6*   HGB 7.5* 6.4* 8.0* 7.5*    272  --  250     BMP:    Recent Labs     01/02/24  0359      K 4.2      CO2 22   BUN 18   CREATININE 1.5*   GLUCOSE 93     Hepatic:   No results for input(s): \"AST\", \"ALT\", \"ALB\", \"BILITOT\", \"ALKPHOS\" in the last 72 hours.    Lipids:   Lab Results   Component Value Date/Time    CHOL 181 12/09/2021 12:00 AM    HDL 54 12/09/2021 12:00 AM    TRIG 181 12/09/2021 12:00 AM     Troponin:   Lab Results   Component Value Date/Time    TROPONINT <0.010 08/27/2021 01:00 PM       UA:  Lab Results   Component Value Date/Time    NITRU NEGATIVE 11/23/2023 10:02 PM    NITRU Negative 04/06/2022 02:11 PM    COLORU YELLOW 11/23/2023 10:02 PM    PHUR 6.0 04/06/2022 02:11 PM    LABCAST None seen 04/06/2022 02:11 PM    LABCAST CANCELED 04/06/2022 02:11 PM    WBCUA 5 11/23/2023 10:02 PM    RBCUA 1 11/23/2023 10:02 PM    MUCUS RARE 11/23/2023 10:02 PM    TRICHOMONAS NONE SEEN 11/23/2023 10:02 PM    YEAST CANCELED 04/06/2022 02:11 PM    BACTERIA NEGATIVE 11/23/2023 10:02 PM    CLARITYU CLEAR 11/23/2023 10:02 PM    SPECGRAV 1.010 11/23/2023 10:02 PM    LEUKOCYTESUR TRACE 11/23/2023 10:02 PM    UROBILINOGEN 
LABCAST None seen 04/06/2022 02:11 PM    LABCAST CANCELED 04/06/2022 02:11 PM    WBCUA 5 11/23/2023 10:02 PM    RBCUA 1 11/23/2023 10:02 PM    MUCUS RARE 11/23/2023 10:02 PM    TRICHOMONAS NONE SEEN 11/23/2023 10:02 PM    YEAST CANCELED 04/06/2022 02:11 PM    BACTERIA NEGATIVE 11/23/2023 10:02 PM    CLARITYU CLEAR 11/23/2023 10:02 PM    SPECGRAV 1.010 11/23/2023 10:02 PM    LEUKOCYTESUR TRACE 11/23/2023 10:02 PM    UROBILINOGEN 0.2 11/23/2023 10:02 PM    BILIRUBINUR NEGATIVE 11/23/2023 10:02 PM    BLOODU NEGATIVE 11/23/2023 10:02 PM    GLUCOSEU Negative 04/06/2022 02:11 PM    KETUA NEGATIVE 11/23/2023 10:02 PM           Electronically signed by Nathaniel Loaiza MD on 1/9/2024 at 2:15 PM
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
## 2024-01-10 NOTE — DISCHARGE SUMMARY
Discharge Summary    Name:  Westley Rios /Age/Sex: 1926  (89 y.o. male)   MRN & CSN:  [id number] & [id number] Admission Date/Time: 2023  9:33 PM   Attending:  Nathaniel Loaiza MD Discharging Physician: Nathaniel Loaiza MD     Discharge diagnosis and plan:    Sepsis, present at admission, 2/2 RLE abscess   RLE abscess  Patient presents with acute onset RLE pain. On admit patient hemodynamically stable, afebrile, leukocytosis of 23, increased inflammatory markers. CT of right tibula showed fluid collection in the subcutaneous tissues overlying the posterior musculature running from the level of the knee down the calf to the mid to distal portion.  Skin thickening and there are a few small foci of gas. General surgery was consulted as per general surgery low suspicion for necrotizing fasciitis. Underwent I&D in the OR on .  Surgical cultures grew Staph aureus and strep pyogenes.  Continued Vanco alone and clindamycin and cefepime were stopped. Infectious disease was consulted: Treated patient with IV vancomycin and de-escalated to oral linezolid. Linezolid to be continued until 2024. Patient will follow up with wound care and surgery outpatient.     Acute blood loss anemia  Baseline hemoglobin is 11-12.  Bleeding from the surgical site.  Discussed with general surgery.  Plan for washout in the OR tomorrow morning.  Monitor hemoglobins daily and transfuse to maintain hemoglobin more than 7.  Currently on a heparin drip which can be continued as per general surgery due to DVT.  Patient did require blood transfusions this admission. Heparin drip once again had to be paused on 2024 secondary to anemia requiring blood transfusion. Wound appears to have stopped bleeding.  Resumed heparin on 2024. Hematology onboard. Will transition to oral anticoagulant prior to discharge.     Provoked RLE DVT  US showed Occlusive DVT noted throughout the right femoral, popliteal, and posterior tibial veins.

## 2024-01-10 NOTE — PLAN OF CARE
Problem: Discharge Planning  Goal: Discharge to home or other facility with appropriate resources  Outcome: Progressing  Flowsheets (Taken 1/10/2024 0904)  Discharge to home or other facility with appropriate resources:   Identify barriers to discharge with patient and caregiver   Arrange for needed discharge resources and transportation as appropriate   Arrange for interpreters to assist at discharge as needed   Identify discharge learning needs (meds, wound care, etc)   Refer to discharge planning if patient needs post-hospital services based on physician order or complex needs related to functional status, cognitive ability or social support system     Problem: Safety - Adult  Goal: Free from fall injury  Outcome: Progressing     Problem: ABCDS Injury Assessment  Goal: Absence of physical injury  Outcome: Progressing     Problem: Skin/Tissue Integrity  Goal: Absence of new skin breakdown  Description: 1.  Monitor for areas of redness and/or skin breakdown  2.  Assess vascular access sites hourly  3.  Every 4-6 hours minimum:  Change oxygen saturation probe site  4.  Every 4-6 hours:  If on nasal continuous positive airway pressure, respiratory therapy assess nares and determine need for appliance change or resting period.  Outcome: Progressing     Problem: Pain  Goal: Verbalizes/displays adequate comfort level or baseline comfort level  Outcome: Progressing     Problem: Nutrition Deficit:  Goal: Optimize nutritional status  Outcome: Progressing

## 2024-01-11 ENCOUNTER — HOSPITAL ENCOUNTER (OUTPATIENT)
Age: 89
Setting detail: SPECIMEN
Discharge: HOME | End: 2024-01-11
Payer: MEDICARE

## 2024-01-11 LAB
ALBUMIN SERPL-MCNC: 2.4 GM/DL (ref 3.4–5)
ALP BLD-CCNC: 83 IU/L (ref 40–128)
ALT SERPL-CCNC: 6 U/L (ref 10–40)
ANION GAP SERPL CALCULATED.3IONS-SCNC: 8 MMOL/L (ref 7–16)
AST SERPL-CCNC: 15 IU/L (ref 15–37)
BILIRUB SERPL-MCNC: 0.5 MG/DL (ref 0–1)
BUN SERPL-MCNC: 10 MG/DL (ref 6–23)
CALCIUM SERPL-MCNC: 7.6 MG/DL (ref 8.3–10.6)
CHLORIDE BLD-SCNC: 104 MMOL/L (ref 99–110)
CO2: 25 MMOL/L (ref 21–32)
CREAT SERPL-MCNC: 1.9 MG/DL (ref 0.9–1.3)
GFR SERPL CREATININE-BSD FRML MDRD: 32 ML/MIN/1.73M2
GLUCOSE SERPL-MCNC: 75 MG/DL (ref 70–99)
HCT VFR BLD CALC: 25.7 % (ref 42–52)
HEMOGLOBIN: 7.5 GM/DL (ref 13.5–18)
MCH RBC QN AUTO: 29.2 PG (ref 27–31)
MCHC RBC AUTO-ENTMCNC: 29.2 % (ref 32–36)
MCV RBC AUTO: 100 FL (ref 78–100)
PDW BLD-RTO: 20.6 % (ref 11.7–14.9)
PLATELET # BLD: 225 K/CU MM (ref 140–440)
PMV BLD AUTO: 9.9 FL (ref 7.5–11.1)
POTASSIUM SERPL-SCNC: 4.7 MMOL/L (ref 3.5–5.1)
RBC # BLD: 2.57 M/CU MM (ref 4.6–6.2)
SODIUM BLD-SCNC: 137 MMOL/L (ref 135–145)
TOTAL PROTEIN: 4.4 GM/DL (ref 6.4–8.2)
WBC # BLD: 6.3 K/CU MM (ref 4–10.5)

## 2024-01-11 PROCEDURE — 36415 COLL VENOUS BLD VENIPUNCTURE: CPT

## 2024-01-11 PROCEDURE — 85027 COMPLETE CBC AUTOMATED: CPT

## 2024-01-11 PROCEDURE — 80053 COMPREHEN METABOLIC PANEL: CPT

## 2024-01-16 ENCOUNTER — TELEPHONE (OUTPATIENT)
Dept: CARDIOLOGY CLINIC | Age: 89
End: 2024-01-16

## 2024-01-16 ENCOUNTER — TELEPHONE (OUTPATIENT)
Dept: WOUND CARE | Age: 89
End: 2024-01-16

## 2024-01-16 NOTE — TELEPHONE ENCOUNTER
Dad is no longer walking.  Dad fell and broke his hip.  [facility].

## 2024-01-16 NOTE — TELEPHONE ENCOUNTER
Called patient on a referral we received. The wife had stated to call [place] [facility] I called [place] was forwarded to [facility] supervisor she stated she would discuss the wounds with the patient  would prefer to have patient be seen here do to the severity of the wound. May did call back and stated there schedule person would be in contact with our office

## 2024-01-17 ENCOUNTER — HOSPITAL ENCOUNTER (OUTPATIENT)
Age: 89
Setting detail: SPECIMEN
Discharge: HOME | End: 2024-01-17

## 2024-01-17 LAB
ALBUMIN SERPL-MCNC: 2.9 GM/DL (ref 3.4–5)
ALP BLD-CCNC: 97 IU/L (ref 40–128)
ALT SERPL-CCNC: 11 U/L (ref 10–40)
ANION GAP SERPL CALCULATED.3IONS-SCNC: 12 MMOL/L (ref 7–16)
AST SERPL-CCNC: 22 IU/L (ref 15–37)
BILIRUB SERPL-MCNC: 0.4 MG/DL (ref 0–1)
BUN SERPL-MCNC: 23 MG/DL (ref 6–23)
CALCIUM SERPL-MCNC: 7.9 MG/DL (ref 8.3–10.6)
CHLORIDE BLD-SCNC: 100 MMOL/L (ref 99–110)
CO2: 25 MMOL/L (ref 21–32)
CREAT SERPL-MCNC: 2.2 MG/DL (ref 0.9–1.3)
GFR SERPL CREATININE-BSD FRML MDRD: 27 ML/MIN/1.73M2
GLUCOSE SERPL-MCNC: 93 MG/DL (ref 70–99)
HCT VFR BLD CALC: 30.1 % (ref 42–52)
HEMOGLOBIN: 9.2 GM/DL (ref 13.5–18)
POTASSIUM SERPL-SCNC: 4.4 MMOL/L (ref 3.5–5.1)
SODIUM BLD-SCNC: 137 MMOL/L (ref 135–145)
TOTAL PROTEIN: 5.4 GM/DL (ref 6.4–8.2)

## 2024-01-17 PROCEDURE — 80053 COMPREHEN METABOLIC PANEL: CPT

## 2024-01-17 PROCEDURE — 36415 COLL VENOUS BLD VENIPUNCTURE: CPT

## 2024-01-17 PROCEDURE — 85018 HEMOGLOBIN: CPT

## 2024-01-17 PROCEDURE — 85014 HEMATOCRIT: CPT

## 2024-01-19 ENCOUNTER — HOSPITAL ENCOUNTER (OUTPATIENT)
Age: 89
Setting detail: SPECIMEN
Discharge: HOME | End: 2024-01-19

## 2024-01-19 LAB
HCT VFR BLD CALC: 33.2 % (ref 42–52)
HEMOGLOBIN: 9.8 GM/DL (ref 13.5–18)
MCH RBC QN AUTO: 29.8 PG (ref 27–31)
MCHC RBC AUTO-ENTMCNC: 29.5 % (ref 32–36)
MCV RBC AUTO: 100.9 FL (ref 78–100)
PDW BLD-RTO: 20.8 % (ref 11.7–14.9)
PLATELET # BLD: 271 K/CU MM (ref 140–440)
PMV BLD AUTO: 9.7 FL (ref 7.5–11.1)
RBC # BLD: 3.29 M/CU MM (ref 4.6–6.2)
WBC # BLD: 6.4 K/CU MM (ref 4–10.5)

## 2024-01-19 PROCEDURE — 36415 COLL VENOUS BLD VENIPUNCTURE: CPT

## 2024-01-19 PROCEDURE — 85027 COMPLETE CBC AUTOMATED: CPT

## 2024-01-22 ENCOUNTER — HOSPITAL ENCOUNTER (OUTPATIENT)
Age: 89
Setting detail: SPECIMEN
Discharge: HOME | End: 2024-01-22

## 2024-01-22 LAB
HCT VFR BLD CALC: 31.4 % (ref 42–52)
HEMOGLOBIN: 9.4 GM/DL (ref 13.5–18)

## 2024-01-22 PROCEDURE — 36415 COLL VENOUS BLD VENIPUNCTURE: CPT

## 2024-01-22 PROCEDURE — 85018 HEMOGLOBIN: CPT

## 2024-01-22 PROCEDURE — 85014 HEMATOCRIT: CPT

## 2024-01-23 ENCOUNTER — HOSPITAL ENCOUNTER (OUTPATIENT)
Dept: WOUND CARE | Age: 89
Discharge: HOME | End: 2024-01-23
Attending: SURGERY
Payer: MEDICARE

## 2024-01-23 ENCOUNTER — HOSPITAL ENCOUNTER (OUTPATIENT)
Age: 89
Setting detail: SPECIMEN
Discharge: HOME | End: 2024-01-23

## 2024-01-23 VITALS
RESPIRATION RATE: 16 BRPM | TEMPERATURE: 98 F | SYSTOLIC BLOOD PRESSURE: 116 MMHG | HEART RATE: 78 BPM | DIASTOLIC BLOOD PRESSURE: 58 MMHG

## 2024-01-23 PROBLEM — T81.89XA SURGICAL WOUND, NON HEALING: Status: ACTIVE | Noted: 2024-01-23

## 2024-01-23 LAB
BILIRUBIN URINE: NEGATIVE MG/DL
BLOOD, URINE: NEGATIVE
CLARITY: CLEAR
COLOR: YELLOW
COMMENT UA: NORMAL
GLUCOSE, URINE: NEGATIVE MG/DL
KETONES, URINE: NEGATIVE MG/DL
LEUKOCYTE ESTERASE, URINE: NEGATIVE
NITRITE URINE, QUANTITATIVE: NEGATIVE
PH, URINE: 6 (ref 5–8)
PROTEIN UA: NEGATIVE MG/DL
SPECIFIC GRAVITY UA: 1.01 (ref 1–1.03)
UROBILINOGEN, URINE: 0.2 MG/DL (ref 0.2–1)

## 2024-01-23 PROCEDURE — 87086 URINE CULTURE/COLONY COUNT: CPT

## 2024-01-23 PROCEDURE — 11043 DBRDMT MUSC&/FSCA 1ST 20/<: CPT | Performed by: SURGERY

## 2024-01-23 PROCEDURE — 81003 URINALYSIS AUTO W/O SCOPE: CPT

## 2024-01-23 PROCEDURE — 87077 CULTURE AEROBIC IDENTIFY: CPT

## 2024-01-23 PROCEDURE — 11046 DBRDMT MUSC&/FSCA EA ADDL: CPT | Performed by: SURGERY

## 2024-01-23 PROCEDURE — 87186 SC STD MICRODIL/AGAR DIL: CPT

## 2024-01-23 PROCEDURE — 11043 DBRDMT MUSC&/FSCA 1ST 20/<: CPT

## 2024-01-23 PROCEDURE — 99213 OFFICE O/P EST LOW 20 MIN: CPT

## 2024-01-23 PROCEDURE — 11046 DBRDMT MUSC&/FSCA EA ADDL: CPT

## 2024-01-23 NOTE — PATIENT INSTRUCTIONS
PHYSICIAN ORDERS AND DISCHARGE INSTRUCTIONS    Wound cleansing:     Do not scrub or use excessive force.   Wash hands with soap and water before and after dressing changes.   Prior to applying a clean dressing, cleanse wound with normal saline,               wound cleanser, or mild soap and water.    Ask the physician or nurse before getting the wound(s) wet in a shower      Wound Care Notes:  Imaging:   Cultures:             DARREL:  Wound Care Supplies:   [facility]:   Rx:          Orders for this week:  2024    FAX ORDERS TO [facility]!   -Need to send an aide with client during [facility] visits due to confusion.       [place] to order a wound vac for right posterior lower leg on 24.    Right Ankle and Left & Right Heels - paint with betadine. Cover with optifoam (or ABD) Heel cup. Change/reapply every 3-4 days.    Bilateral Arm and Hand wounds - cover with silicone borders. May leave in place for 1 week.    Right Lower Leg Wound - Wash with soap and water, pat dry.   Apply saline damp wet to dry gauze dressing. Cover with ABDs or Super absorber. Wrap with Kerlix and ACE. Change daily until wound vac can be started.    WOUND VAC THERAPY: Right Lower Leg Wound (apply vac when available)    SKIN PREP OR MASTISOL AND DUODERM TO PERIWOUND FOR PROTECTION. APPLY BLACK FOAM TO WOUND. SECURE VAC DRESSING WITH DRAPE.    SET WOUND VAC  CONTINUOUS SUCTION. CANISTER CHANGE WEEKLY OR ACCORDING TO VOLUME OF DRAINAGE.    WOUND VAC DRESSING TO BE CHANGED BY FACILITY  AND   WOUND CARE CENTER WILL CHANGE ON  (Need to send vac supplies - black foam and canister to appointments)      Dispense 30 day quantity when ordering supplies.      []   Nurse Visit:   Follow Up Instructions: At the Wound Care Center in 1 week   Primary Wound Care Provider: Dr Bermudez   Call  for any questions or concerns.  Central [phone] for imaging and lab work

## 2024-01-23 NOTE — PROGRESS NOTES
performed by Aristides Pacehco II, MD at [facility] OR    PACEMAKER CHANGE Left 2022    dual chamber generator change. [employer] mindy XT DR SILVIA Loza implanted by Dr. Lopez.    PACEMAKER PLACEMENT         FAMILY HISTORY    Family History   Problem Relation Age of Onset    Cancer Sister     Diabetes Brother        SOCIAL HISTORY    Social History     Tobacco Use    Smoking status: Former     Current packs/day: 0.00     Types: Cigarettes     Quit date: 1983     Years since quittin.0    Smokeless tobacco: Never   Vaping Use    Vaping Use: Never used   Substance Use Topics    Alcohol use: Never     Comment: Caffeine: 1-2 cups coffee daily    Drug use: Never       ALLERGIES    Allergies   Allergen Reactions    Penicillins     Sulfa Antibiotics     Adhesive Tape     Diatrizoate      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    Imdur [Isosorbide Nitrate] Rash    Statins Myalgia     Leg cramping.        MEDICATIONS    Current Outpatient Medications on File Prior to Encounter   Medication Sig Dispense Refill    apixaban (ELIQUIS) 5 MG TABS tablet Take 0.5 tablets by mouth 2 times daily 30 tablet 1    Sodium Phosphates (PHOSPHATE ENEMA RE) Place 1 Application rectally daily as needed (constipation)      magnesium hydroxide (MILK OF MAGNESIA) 400 MG/5ML suspension Take 30 mLs by mouth daily as needed for Constipation      traZODone (DESYREL) 50 MG tablet Take 1 tablet by mouth nightly      Misc. Devices (WALKER WHEELS) MISC 1 each by Does not apply route once for 1 dose 1 each 0    methocarbamol (ROBAXIN) 500 MG tablet Take 1 tablet by mouth 3 times daily      metoprolol succinate (TOPROL XL) 25 MG extended release tablet Take 0.5 tablets by mouth daily      rOPINIRole (REQUIP) 0.25 MG tablet Take 3 tablets by mouth nightly      bisacodyl (DULCOLAX) 5 MG EC tablet Take 1 tablet by mouth daily as needed for Constipation      acetaminophen (TYLENOL) 325 MG tablet Take 2 tablets by mouth every 6 hours as needed

## 2024-01-25 ENCOUNTER — HOSPITAL ENCOUNTER (OUTPATIENT)
Age: 89
Setting detail: SPECIMEN
Discharge: HOME | End: 2024-01-25

## 2024-01-25 LAB
ALBUMIN SERPL-MCNC: 2.7 GM/DL (ref 3.4–5)
ALP BLD-CCNC: 89 IU/L (ref 40–128)
ALT SERPL-CCNC: 13 U/L (ref 10–40)
ANION GAP SERPL CALCULATED.3IONS-SCNC: 11 MMOL/L (ref 7–16)
AST SERPL-CCNC: 28 IU/L (ref 15–37)
BILIRUB SERPL-MCNC: 0.4 MG/DL (ref 0–1)
BUN SERPL-MCNC: 35 MG/DL (ref 6–23)
CALCIUM SERPL-MCNC: 7.4 MG/DL (ref 8.3–10.6)
CHLORIDE BLD-SCNC: 97 MMOL/L (ref 99–110)
CO2: 26 MMOL/L (ref 21–32)
CREAT SERPL-MCNC: 1.6 MG/DL (ref 0.9–1.3)
CULTURE: ABNORMAL
GFR SERPL CREATININE-BSD FRML MDRD: 39 ML/MIN/1.73M2
GLUCOSE SERPL-MCNC: 86 MG/DL (ref 70–99)
HCT VFR BLD CALC: 27.4 % (ref 42–52)
HEMOGLOBIN: 8.1 GM/DL (ref 13.5–18)
Lab: ABNORMAL
MCH RBC QN AUTO: 29.6 PG (ref 27–31)
MCHC RBC AUTO-ENTMCNC: 29.6 % (ref 32–36)
MCV RBC AUTO: 100 FL (ref 78–100)
PDW BLD-RTO: 20.6 % (ref 11.7–14.9)
PLATELET # BLD: 279 K/CU MM (ref 140–440)
PMV BLD AUTO: 9.6 FL (ref 7.5–11.1)
POTASSIUM SERPL-SCNC: 4.7 MMOL/L (ref 3.5–5.1)
RBC # BLD: 2.74 M/CU MM (ref 4.6–6.2)
SODIUM BLD-SCNC: 134 MMOL/L (ref 135–145)
SPECIMEN: ABNORMAL
TOTAL PROTEIN: 4.9 GM/DL (ref 6.4–8.2)
WBC # BLD: 8.2 K/CU MM (ref 4–10.5)

## 2024-01-25 PROCEDURE — 80053 COMPREHEN METABOLIC PANEL: CPT

## 2024-01-25 PROCEDURE — 36415 COLL VENOUS BLD VENIPUNCTURE: CPT

## 2024-01-25 PROCEDURE — 85027 COMPLETE CBC AUTOMATED: CPT

## 2024-01-29 ENCOUNTER — HOSPITAL ENCOUNTER (OUTPATIENT)
Age: 89
Setting detail: SPECIMEN
Discharge: HOME | End: 2024-01-29

## 2024-01-29 LAB
ANION GAP SERPL CALCULATED.3IONS-SCNC: 9 MMOL/L (ref 7–16)
BUN SERPL-MCNC: 33 MG/DL (ref 6–23)
CALCIUM SERPL-MCNC: 7.6 MG/DL (ref 8.3–10.6)
CHLORIDE BLD-SCNC: 102 MMOL/L (ref 99–110)
CO2: 26 MMOL/L (ref 21–32)
CREAT SERPL-MCNC: 1.7 MG/DL (ref 0.9–1.3)
GFR SERPL CREATININE-BSD FRML MDRD: 36 ML/MIN/1.73M2
GLUCOSE SERPL-MCNC: 85 MG/DL (ref 70–99)
POTASSIUM SERPL-SCNC: 4.5 MMOL/L (ref 3.5–5.1)
SODIUM BLD-SCNC: 137 MMOL/L (ref 135–145)

## 2024-01-29 PROCEDURE — 80048 BASIC METABOLIC PNL TOTAL CA: CPT

## 2024-01-29 PROCEDURE — 93294 REM INTERROG EVL PM/LDLS PM: CPT | Performed by: INTERNAL MEDICINE

## 2024-01-29 PROCEDURE — 93296 REM INTERROG EVL PM/IDS: CPT | Performed by: INTERNAL MEDICINE

## 2024-01-29 PROCEDURE — 36415 COLL VENOUS BLD VENIPUNCTURE: CPT

## 2024-01-30 ENCOUNTER — HOSPITAL ENCOUNTER (OUTPATIENT)
Dept: WOUND CARE | Age: 89
Discharge: HOME | End: 2024-01-30
Attending: SURGERY
Payer: MEDICARE

## 2024-01-30 ENCOUNTER — TELEPHONE (OUTPATIENT)
Dept: CARDIOLOGY CLINIC | Age: 89
End: 2024-01-30

## 2024-01-30 ENCOUNTER — PROCEDURE VISIT (OUTPATIENT)
Dept: CARDIOLOGY CLINIC | Age: 89
End: 2024-01-30
Payer: MEDICARE

## 2024-01-30 VITALS — RESPIRATION RATE: 17 BRPM | HEART RATE: 76 BPM

## 2024-01-30 DIAGNOSIS — T81.89XA NON-HEALING SURGICAL WOUND, INITIAL ENCOUNTER: Primary | ICD-10-CM

## 2024-01-30 DIAGNOSIS — L97.312 ANKLE ULCER, RIGHT, WITH FAT LAYER EXPOSED (HCC): ICD-10-CM

## 2024-01-30 DIAGNOSIS — L89.620 DECUBITUS ULCER OF LEFT HEEL, UNSTAGEABLE (HCC): ICD-10-CM

## 2024-01-30 DIAGNOSIS — I44.30 AV BLOCK: ICD-10-CM

## 2024-01-30 DIAGNOSIS — L98.495 NON-PRESSURE CHRONIC ULCER OF SKIN OF OTHER SITES WITH MUSCLE INVOLVEMENT WITHOUT EVIDENCE OF NECROSIS (HCC): ICD-10-CM

## 2024-01-30 DIAGNOSIS — L98.492 TRAUMATIC ULCER WITH FAT LAYER EXPOSED (HCC): ICD-10-CM

## 2024-01-30 DIAGNOSIS — L97.522 TOE ULCER, LEFT, WITH FAT LAYER EXPOSED (HCC): ICD-10-CM

## 2024-01-30 DIAGNOSIS — L89.610 PRESSURE INJURY OF RIGHT HEEL, UNSTAGEABLE (HCC): ICD-10-CM

## 2024-01-30 DIAGNOSIS — I49.5 SINUS NODE DYSFUNCTION (HCC): ICD-10-CM

## 2024-01-30 DIAGNOSIS — Z95.0 CARDIAC PACEMAKER IN SITU: Primary | ICD-10-CM

## 2024-01-30 PROCEDURE — 11719 TRIM NAIL(S) ANY NUMBER: CPT

## 2024-01-30 PROCEDURE — 11045 DBRDMT SUBQ TISS EACH ADDL: CPT

## 2024-01-30 PROCEDURE — 11046 DBRDMT MUSC&/FSCA EA ADDL: CPT | Performed by: NURSE PRACTITIONER

## 2024-01-30 PROCEDURE — 97606 NEG PRS WND THER DME>50 SQCM: CPT

## 2024-01-30 PROCEDURE — 11719 TRIM NAIL(S) ANY NUMBER: CPT | Performed by: NURSE PRACTITIONER

## 2024-01-30 PROCEDURE — 11042 DBRDMT SUBQ TIS 1ST 20SQCM/<: CPT

## 2024-01-30 PROCEDURE — 11043 DBRDMT MUSC&/FSCA 1ST 20/<: CPT | Performed by: NURSE PRACTITIONER

## 2024-01-30 ASSESSMENT — PAIN SCALES - GENERAL: PAINLEVEL_OUTOF10: 0

## 2024-01-30 NOTE — PATIENT INSTRUCTIONS
PHYSICIAN ORDERS AND DISCHARGE INSTRUCTIONS    Wound cleansing:     Do not scrub or use excessive force.   Wash hands with soap and water before and after dressing changes.   Prior to applying a clean dressing, cleanse wound with normal saline,               wound cleanser, or mild soap and water.    Ask the physician or nurse before getting the wound(s) wet in a shower      Wound Care Notes:  Imaging:   Cultures:             DARREL:  Wound Care Supplies:   C:   Rx:          Orders for this week:  1/30/2024    FAX ORDERS TO [place]! Send Patient with wound vac supplies to each appointment to wound care center    -Need to send an aide with client during C visits due to confusion.       [place] to order a wound vac for right posterior lower leg on 1/23/24.    Right Ankle and Left & Right Heels - paint with betadine. Cover with optifoam (or ABD) Heel cup. Change/reapply every 3-4 days.    Arm Wounds - Apply stimulen powder to wound bed. cover with silicone borders. May leave in place for 1 week.    Left 3rd Toe: Apply gentamicin and stimulen powder to wound bed. Cover with ca alginate, Wrap with conform    Right Lower Leg Wound - Wash with soap and water, pat dry.   WOUND VAC THERAPY: Right Posterior Lower Leg Wound (apply vac when available)  SKIN PREP OR MASTISOL AND DUODERM TO PERIWOUND FOR PROTECTION.   Apply stimulen powder and sorbact to wound bed   APPLY BLACK FOAM TO WOUND. SECURE VAC DRESSING WITH DRAPE.  SET WOUND VAC  CONTINUOUS SUCTION. CANISTER CHANGE WEEKLY OR ACCORDING TO VOLUME OF DRAINAGE.  WOUND VAC DRESSING TO BE CHANGED BY FACILITY THURSDAYS AND SATURDAYS  WOUND CARE CENTER WILL CHANGE ON TUESDAYS (Need to send vac supplies - black foam and canister to appointments)    Place ca algiante between toes and wrap legs with ace wrap     Dispense 30 day quantity when ordering supplies.      []   Nurse Visit:   Follow Up Instructions: At the Wound Care Center in 1 week   Primary Wound Care

## 2024-01-30 NOTE — PROGRESS NOTES
Negative Pressure Wound Therapy    NAME:  Westley Rios  YOB: 1926  MEDICAL RECORD NUMBER:  [id number]  DATE:  1/30/2024    Applied Negative Pressure to Right Posterior Leg wound(s)/ulcer(s).  [x] Applied skin barrier prep to tiffany-wound.   [x] Cut strips of plastic drape to picture frame wound so that tiffany-wound is covered with the drape.   [x] If bridging dressing to less prominent site, cover any intact skin that will come in contact with the Negative Pressure Therapy sponge, gauze or channel drain with plastic drape. The sponge should never touch intact skin.   [x] Cut sponge, gauze or channel drain to size which will fit into the wound/ulcer bed without being forced.   [x] Be sure the sponge is large enough to hold the entire round plastic flange which is attached to the tubing. Never allow flange to be larger than the sponge or it will produce suction damaging intact skin.  Total number of individual pieces of foam used within the wound bed: 1    [x] If bridging the dressing away from the primary site, be sure the bridge leads to a piece of sponge large enough to hold the entire flange without allowing any of the flange to overlap onto intact skin.   [x] Covered sponge, gauze or channel drain with plastic drape.   [x] Cut a hole in this plastic drape directly over the sponge the same size as the plastic drain tubing.   [x] Removed plastic liner from flange and apply it directly over the hole you cut.   [x] Removed the plastic cover from the flange.   [x] Attached the tubing to the wound/ulcer Negative Pressure Therapy and turn it on to be sure a vacuum is created and that there are no leaks.   [x] If air leaks occur, use plastic drape to patch them.   [x] Secured Negative Pressure Therapy dressing with ace wrap loosely if located on an extremity. Maintain tubing outside of ace wrap. Tubing must not exert pressure on intact skin.    Applied per  Guidelines      Electronically signed 
DRESSING WITH DRAPE.  SET WOUND VAC  CONTINUOUS SUCTION. CANISTER CHANGE WEEKLY OR ACCORDING TO VOLUME OF DRAINAGE.  WOUND VAC DRESSING TO BE CHANGED BY FACILITY  AND   WOUND CARE CENTER WILL CHANGE ON  (Need to send vac supplies - black foam and canister to appointments)    Place ca algiante between toes and wrap legs with ace wrap     Dispense 30 day quantity when ordering supplies.      []   Nurse Visit:   Follow Up Instructions: At the Wound Care Center in 1 week   Primary Wound Care Provider: Dr Bermudez   Call  for any questions or concerns.  Central [phone] for imaging and lab work        Treatment Note Wound 24 Tibial Posterior;Right #1-Dressing/Treatment:  (Mastisol, Duoderm, Stimulen, Sorbact, Black Foam, Drape, ACE wrap)  Wound 23 #5 left heel-Dressing/Treatment:  (Betadine, Optifoam heel cup)  Wound 23 #4 right lateral ankle-Dressing/Treatment:  (Betadine, Optifoam heel cup)  Wound 23 #6 right heel-Dressing/Treatment:  (Betadine, Optifoam heel cup)  Wound 24 Radial Proximal;Right #2 cluster-Dressing/Treatment:  (Stimulen powder, Silicone Border)  Wound 24 #7 left 3rd toe-Dressing/Treatment:  (Gentamicin, Stimulen, Calcium Alginate, Conform)    Written Patient Dismissal Instructions Given            Electronically signed by DASH Treadwell CNP on 2024 at 6:53 AM

## 2024-01-31 PROBLEM — L89.620 DECUBITUS ULCER OF LEFT HEEL, UNSTAGEABLE (HCC): Status: ACTIVE | Noted: 2024-01-31

## 2024-01-31 PROBLEM — L98.492 TRAUMATIC ULCER WITH FAT LAYER EXPOSED (HCC): Status: ACTIVE | Noted: 2024-01-31

## 2024-01-31 PROBLEM — L89.610 PRESSURE INJURY OF RIGHT HEEL, UNSTAGEABLE (HCC): Status: ACTIVE | Noted: 2024-01-31

## 2024-01-31 PROBLEM — L97.312 ANKLE ULCER, RIGHT, WITH FAT LAYER EXPOSED (HCC): Status: ACTIVE | Noted: 2024-01-31

## 2024-01-31 PROBLEM — L97.522 TOE ULCER, LEFT, WITH FAT LAYER EXPOSED (HCC): Status: ACTIVE | Noted: 2024-01-31

## 2024-02-01 ENCOUNTER — HOSPITAL ENCOUNTER (OUTPATIENT)
Age: 89
Setting detail: SPECIMEN
Discharge: HOME | End: 2024-02-01

## 2024-02-01 LAB
ALBUMIN SERPL-MCNC: 2.7 GM/DL (ref 3.4–5)
ALP BLD-CCNC: 92 IU/L (ref 40–128)
ALT SERPL-CCNC: 12 U/L (ref 10–40)
ANION GAP SERPL CALCULATED.3IONS-SCNC: 12 MMOL/L (ref 7–16)
AST SERPL-CCNC: 20 IU/L (ref 15–37)
BILIRUB SERPL-MCNC: 0.3 MG/DL (ref 0–1)
BUN SERPL-MCNC: 38 MG/DL (ref 6–23)
CALCIUM SERPL-MCNC: 7.6 MG/DL (ref 8.3–10.6)
CHLORIDE BLD-SCNC: 105 MMOL/L (ref 99–110)
CO2: 22 MMOL/L (ref 21–32)
CREAT SERPL-MCNC: 1.9 MG/DL (ref 0.9–1.3)
GFR SERPL CREATININE-BSD FRML MDRD: 32 ML/MIN/1.73M2
GLUCOSE SERPL-MCNC: 86 MG/DL (ref 70–99)
HCT VFR BLD CALC: 26.8 % (ref 42–52)
HEMOGLOBIN: 7.8 GM/DL (ref 13.5–18)
MCH RBC QN AUTO: 28.8 PG (ref 27–31)
MCHC RBC AUTO-ENTMCNC: 29.1 % (ref 32–36)
MCV RBC AUTO: 98.9 FL (ref 78–100)
PDW BLD-RTO: 20 % (ref 11.7–14.9)
PLATELET # BLD: 438 K/CU MM (ref 140–440)
PMV BLD AUTO: 9.3 FL (ref 7.5–11.1)
POTASSIUM SERPL-SCNC: 5.1 MMOL/L (ref 3.5–5.1)
RBC # BLD: 2.71 M/CU MM (ref 4.6–6.2)
SODIUM BLD-SCNC: 139 MMOL/L (ref 135–145)
TOTAL PROTEIN: 5.2 GM/DL (ref 6.4–8.2)
WBC # BLD: 8.9 K/CU MM (ref 4–10.5)

## 2024-02-01 PROCEDURE — 85027 COMPLETE CBC AUTOMATED: CPT

## 2024-02-01 PROCEDURE — 80053 COMPREHEN METABOLIC PANEL: CPT

## 2024-02-01 PROCEDURE — 36415 COLL VENOUS BLD VENIPUNCTURE: CPT

## 2024-02-05 ENCOUNTER — HOSPITAL ENCOUNTER (OUTPATIENT)
Age: 89
Setting detail: SPECIMEN
Discharge: HOME | End: 2024-02-05

## 2024-02-05 LAB
ANION GAP SERPL CALCULATED.3IONS-SCNC: 12 MMOL/L (ref 7–16)
BUN SERPL-MCNC: 36 MG/DL (ref 6–23)
CALCIUM SERPL-MCNC: 7.7 MG/DL (ref 8.3–10.6)
CHLORIDE BLD-SCNC: 105 MMOL/L (ref 99–110)
CO2: 23 MMOL/L (ref 21–32)
CREAT SERPL-MCNC: 1.8 MG/DL (ref 0.9–1.3)
GFR SERPL CREATININE-BSD FRML MDRD: 34 ML/MIN/1.73M2
GLUCOSE SERPL-MCNC: 72 MG/DL (ref 70–99)
HCT VFR BLD CALC: 26.4 % (ref 42–52)
HEMOGLOBIN: 7.4 GM/DL (ref 13.5–18)
MCH RBC QN AUTO: 28.5 PG (ref 27–31)
MCHC RBC AUTO-ENTMCNC: 28 % (ref 32–36)
MCV RBC AUTO: 101.5 FL (ref 78–100)
PDW BLD-RTO: 20.5 % (ref 11.7–14.9)
PLATELET # BLD: 428 K/CU MM (ref 140–440)
PMV BLD AUTO: 9.4 FL (ref 7.5–11.1)
POTASSIUM SERPL-SCNC: 4.8 MMOL/L (ref 3.5–5.1)
RBC # BLD: 2.6 M/CU MM (ref 4.6–6.2)
SODIUM BLD-SCNC: 140 MMOL/L (ref 135–145)
WBC # BLD: 6.9 K/CU MM (ref 4–10.5)

## 2024-02-05 PROCEDURE — 80048 BASIC METABOLIC PNL TOTAL CA: CPT

## 2024-02-05 PROCEDURE — 85027 COMPLETE CBC AUTOMATED: CPT

## 2024-02-05 PROCEDURE — 82270 OCCULT BLOOD FECES: CPT

## 2024-02-05 PROCEDURE — 36415 COLL VENOUS BLD VENIPUNCTURE: CPT

## 2024-02-06 ENCOUNTER — HOSPITAL ENCOUNTER (OUTPATIENT)
Dept: WOUND CARE | Age: 89
Discharge: HOME | End: 2024-02-06
Attending: SURGERY
Payer: MEDICARE

## 2024-02-06 PROCEDURE — 11046 DBRDMT MUSC&/FSCA EA ADDL: CPT | Performed by: SURGERY

## 2024-02-06 PROCEDURE — 11043 DBRDMT MUSC&/FSCA 1ST 20/<: CPT | Performed by: SURGERY

## 2024-02-06 PROCEDURE — 11043 DBRDMT MUSC&/FSCA 1ST 20/<: CPT

## 2024-02-06 PROCEDURE — 11046 DBRDMT MUSC&/FSCA EA ADDL: CPT

## 2024-02-06 ASSESSMENT — PAIN SCALES - GENERAL: PAINLEVEL_OUTOF10: 0

## 2024-02-06 NOTE — PATIENT INSTRUCTIONS
PHYSICIAN ORDERS AND DISCHARGE INSTRUCTIONS    Wound cleansing:     Do not scrub or use excessive force.   Wash hands with soap and water before and after dressing changes.   Prior to applying a clean dressing, cleanse wound with normal saline,               wound cleanser, or mild soap and water.    Ask the physician or nurse before getting the wound(s) wet in a shower      Wound Care Notes:  Imaging:   Cultures:             DARREL:  Wound Care Supplies:   C:   Rx:          Orders for this week:  2/6/2024    FAX ORDERS TO [place]! Send Patient with wound vac supplies to each appointment to wound care center    -Need to send an aide with client during C visits due to confusion.       [place] to order a wound vac for right posterior lower leg on 1/23/24.    Right Ankle and Left & Right Heels - paint with betadine. Cover with optifoam (or ABD) Heel cup. Leave in place for 1 week.    Arm Wounds - Apply stimulen powder to wound bed. cover with silicone borders. May leave in place for 1 week.    Left 3rd Toe: Apply gentamicin and stimulen powder to wound bed. Cover with ca alginate. Include in coban 2 Lite wraps.      Right Lower Leg Wound - Wash with soap and water, pat dry.   WOUND VAC THERAPY: Right Posterior Lower Leg Wound (apply vac when available)  SKIN PREP OR MASTISOL AND DUODERM TO PERIWOUND FOR PROTECTION.   Apply stimulen powder and sorbact to wound bed   APPLY BLACK FOAM TO WOUND. SECURE VAC DRESSING WITH DRAPE.  SET WOUND VAC  CONTINUOUS SUCTION. CANISTER CHANGE WEEKLY OR ACCORDING TO VOLUME OF DRAINAGE.  WOUND VAC DRESSING TO BE CHANGED BY FACILITY THURSDAYS AND SATURDAYS  WOUND CARE CENTER WILL CHANGE ON TUESDAYS (Need to send vac supplies - black foam and canister to appointments)    Place ca alginate between toes and wrap bilateral legs with Coban 2 Lite (enclose toes).   Left leg wrap can stay on for 1 week, Right will need to be changed with vac dressing changes.   DO NOT COMPRESS VAC

## 2024-02-06 NOTE — WOUND CARE
WOUND VAC THERAPY:     DUODERM TO PERIWOUND FOR PROTECTION. APPLY BLACK FOAM TO WOUND  OF RIGHT POSTERIOR LOWER LEG. TO WOUND BED TO PREVENT BLACK FOAM SECURE VAC. DRESSING WITH DRAPE.    SET WOUND VAC  CONTINUOUS SUCTION. CANISTER CHANGE WITH EACH DRESSING CHANGE OR ACCORDING TO VOLUME OF DRAINAGE.    WOUND VAC DRESSING TO BE CHANGED MON, WED, FRI

## 2024-02-06 NOTE — PROGRESS NOTES
Surface Area (cm^2) 0.3 cm^2 01/30/24 1033   Post-Procedure Volume (cm^3) 0.03 cm^3 01/30/24 1033   Distance Tunneling (cm) 0 cm 02/06/24 0949   Tunneling Position ___ O'Clock 0 02/06/24 0949   Undermining Starts ___ O'Clock 0 02/06/24 0949   Undermining Ends___ O'Clock 0 02/06/24 0949   Undermining Maxium Distance (cm) 0 02/06/24 0949   Wound Assessment Dry 02/06/24 0949   Drainage Amount None (dry) 02/06/24 0949   Odor None 02/06/24 0949   Meron-wound Assessment Intact 02/06/24 0949   Margins Attached edges 02/06/24 0949   Wound Thickness Description not for Pressure Injury Full thickness 02/06/24 0949   Number of days: 39       Wound 12/28/23 #5 left heel (Active)   Wound Image   01/23/24 1045   Wound Etiology Pressure Unstageable 02/06/24 0949   Dressing Status New dressing applied 01/30/24 1055   Wound Cleansed Wound cleanser 02/06/24 0949   Dressing/Treatment Open to air 01/10/24 1223   Offloading for Diabetic Foot Ulcers Other (comment) 02/06/24 0949   Wound Length (cm) 1.8 cm 02/06/24 0949   Wound Width (cm) 2 cm 02/06/24 0949   Wound Depth (cm) 0.1 cm 02/06/24 0949   Wound Surface Area (cm^2) 3.6 cm^2 02/06/24 0949   Change in Wound Size % (l*w) 52 02/06/24 0949   Wound Volume (cm^3) 0.36 cm^3 02/06/24 0949   Post-Procedure Length (cm) 1.5 cm 01/30/24 1033   Post-Procedure Width (cm) 2.1 cm 01/30/24 1033   Post-Procedure Depth (cm) 0.1 cm 01/30/24 1033   Post-Procedure Surface Area (cm^2) 3.15 cm^2 01/30/24 1033   Post-Procedure Volume (cm^3) 0.315 cm^3 01/30/24 1033   Distance Tunneling (cm) 0 cm 02/06/24 0949   Tunneling Position ___ O'Clock 0 02/06/24 0949   Undermining Starts ___ O'Clock 0 02/06/24 0949   Undermining Ends___ O'Clock 0 02/06/24 0949   Undermining Maxium Distance (cm) 0 02/06/24 0949   Wound Assessment Eschar dry 02/06/24 0949   Drainage Amount None (dry) 02/06/24 0949   Odor None 02/06/24 0949   Meron-wound Assessment Hyperkeratosis (callous) 02/06/24 0949   Margins Attached edges 02/06/24 
  Drainage Amount None (dry) 02/06/24 0949   Drainage Description Serosanguinous 01/30/24 1026   Odor None 02/06/24 0949   Meron-wound Assessment Fragile 02/06/24 0949   Margins Defined edges 02/06/24 0949   Wound Thickness Description not for Pressure Injury Full thickness 02/06/24 0949   Number of days: 6           Percent of Wound(s) Debrided: approximately 100%    Total  Area  Debrided:  201 sq cm     Bleeding:  Minimal    Hemostasis Achieved:  by pressure    Procedural Pain:  2  / 10     Post Procedural Pain:  0 / 10     Response to treatment:  Well tolerated by patient.     Status of wound progress and description from last visit:   stable       Plan:       Patient Instructions   PHYSICIAN ORDERS AND DISCHARGE INSTRUCTIONS    Wound cleansing:     Do not scrub or use excessive force.   Wash hands with soap and water before and after dressing changes.   Prior to applying a clean dressing, cleanse wound with normal saline,               wound cleanser, or mild soap and water.    Ask the physician or nurse before getting the wound(s) wet in a shower      Wound Care Notes:  Imaging:   Cultures:             DARREL:  Wound Care Supplies:   C:   Rx:          Orders for this week:  2/6/2024    FAX ORDERS TO [place]! Send Patient with wound vac supplies to each appointment to wound care center    -Need to send an aide with client during WCC visits due to confusion.       [place] to order a wound vac for right posterior lower leg on 1/23/24.    Right Ankle and Left & Right Heels - paint with betadine. Cover with optifoam (or ABD) Heel cup. Leave in place for 1 week.    Arm Wounds - Apply stimulen powder to wound bed. cover with silicone borders. May leave in place for 1 week.    Left 3rd Toe: Apply gentamicin and stimulen powder to wound bed. Cover with ca alginate, Wrap with conform    Right Lower Leg Wound - Wash with soap and water, pat dry.   WOUND VAC THERAPY: Right Posterior Lower Leg Wound (apply vac when

## 2024-02-08 ENCOUNTER — HOSPITAL ENCOUNTER (OUTPATIENT)
Age: 89
Setting detail: SPECIMEN
Discharge: HOME | End: 2024-02-08

## 2024-02-08 LAB
ALBUMIN SERPL-MCNC: 2.7 GM/DL (ref 3.4–5)
ALP BLD-CCNC: 102 IU/L (ref 40–128)
ALT SERPL-CCNC: 9 U/L (ref 10–40)
ANION GAP SERPL CALCULATED.3IONS-SCNC: 9 MMOL/L (ref 7–16)
AST SERPL-CCNC: 15 IU/L (ref 15–37)
BILIRUB SERPL-MCNC: 0.2 MG/DL (ref 0–1)
BUN SERPL-MCNC: 41 MG/DL (ref 6–23)
CALCIUM SERPL-MCNC: 7.8 MG/DL (ref 8.3–10.6)
CHLORIDE BLD-SCNC: 106 MMOL/L (ref 99–110)
CO2: 24 MMOL/L (ref 21–32)
CREAT SERPL-MCNC: 1.9 MG/DL (ref 0.9–1.3)
GFR SERPL CREATININE-BSD FRML MDRD: 32 ML/MIN/1.73M2
GLUCOSE SERPL-MCNC: 67 MG/DL (ref 70–99)
HCT VFR BLD CALC: 27 % (ref 42–52)
HEMOCCULT SP1 STL QL: POSITIVE
HEMOGLOBIN: 8 GM/DL (ref 13.5–18)
MCH RBC QN AUTO: 28.7 PG (ref 27–31)
MCHC RBC AUTO-ENTMCNC: 29.6 % (ref 32–36)
MCV RBC AUTO: 96.8 FL (ref 78–100)
OCCULT BLOOD 2: NEGATIVE
OCCULT BLOOD 3: NEGATIVE
PDW BLD-RTO: 20.3 % (ref 11.7–14.9)
PLATELET # BLD: 410 K/CU MM (ref 140–440)
PMV BLD AUTO: 9.3 FL (ref 7.5–11.1)
POTASSIUM SERPL-SCNC: 4.4 MMOL/L (ref 3.5–5.1)
RBC # BLD: 2.79 M/CU MM (ref 4.6–6.2)
SODIUM BLD-SCNC: 139 MMOL/L (ref 135–145)
TOTAL PROTEIN: 5.2 GM/DL (ref 6.4–8.2)
WBC # BLD: 8.7 K/CU MM (ref 4–10.5)

## 2024-02-08 PROCEDURE — 85027 COMPLETE CBC AUTOMATED: CPT

## 2024-02-08 PROCEDURE — 36415 COLL VENOUS BLD VENIPUNCTURE: CPT

## 2024-02-08 PROCEDURE — 80053 COMPREHEN METABOLIC PANEL: CPT

## 2024-02-13 ENCOUNTER — HOSPITAL ENCOUNTER (OUTPATIENT)
Dept: WOUND CARE | Age: 89
Discharge: HOME | End: 2024-02-13
Attending: SURGERY
Payer: MEDICARE

## 2024-02-13 VITALS
SYSTOLIC BLOOD PRESSURE: 104 MMHG | DIASTOLIC BLOOD PRESSURE: 41 MMHG | HEART RATE: 64 BPM | RESPIRATION RATE: 18 BRPM | TEMPERATURE: 97.3 F

## 2024-02-13 DIAGNOSIS — T81.89XD NON-HEALING SURGICAL WOUND, SUBSEQUENT ENCOUNTER: Primary | ICD-10-CM

## 2024-02-13 DIAGNOSIS — L03.119 CELLULITIS AND ABSCESS OF LEG: ICD-10-CM

## 2024-02-13 DIAGNOSIS — L02.419 CELLULITIS AND ABSCESS OF LEG: ICD-10-CM

## 2024-02-13 PROCEDURE — 11046 DBRDMT MUSC&/FSCA EA ADDL: CPT | Performed by: SURGERY

## 2024-02-13 PROCEDURE — 97606 NEG PRS WND THER DME>50 SQCM: CPT

## 2024-02-13 PROCEDURE — 11043 DBRDMT MUSC&/FSCA 1ST 20/<: CPT | Performed by: SURGERY

## 2024-02-13 PROCEDURE — 29581 APPL MULTLAYER CMPRN SYS LEG: CPT

## 2024-02-13 NOTE — PROGRESS NOTES
Negative Pressure Wound Therapy    NAME:  Westley Rios  YOB: 1926  MEDICAL RECORD NUMBER:  [id number]  DATE:  2/13/2024    Applied Negative Pressure to right posterior leg.  [x] Applied skin barrier prep to tiffany-wound.   [x] Cut strips of plastic drape to picture frame wound so that tiffany-wound is     covered with the drape.   [x] If bridging dressing to less prominent site, cover any intact skin that will come in contact with the Negative Pressure Therapy sponge, gauze or channel drain with plastic drape. The sponge should never touch intact skin.   [] Cut sponge, gauze or channel drain to size which will fit into the wound/ulcer bed without being forced.   [x] Be sure the sponge is large enough to hold the entire round plastic flange which is attached to the tubing. Never allow flange to be larger than the sponge or it will produce suction damaging intact skin.  Total number of individual pieces of foam used within the wound bed: 1    [x] If bridging the dressing away from the primary site, be sure the bridge leads to a piece of sponge large enough to hold the entire flange without allowing any of the flange to overlap onto intact skin.   [x] Covered sponge, gauze or channel drain with plastic drape.   [x] Cut a hole in this plastic drape directly over the sponge the same size as the plastic drain tubing.   [x] Removed plastic liner from flange and apply it directly over the hole you cut.   [x] Removed the plastic cover from the flange.   [x] Attached the tubing to the wound/ulcer Negative Pressure Therapy and turn it on to be sure a vacuum is created and that there are no leaks.   [x] If air leaks occur, use plastic drape to patch them.   [x] Secured Negative Pressure Therapy dressing with ace wrap loosely if located on an extremity. Maintain tubing outside of ace wrap. Tubing must not exert pressure on intact skin.    Applied per  Guidelines      Electronically signed by Belkys SHUKLA

## 2024-02-13 NOTE — PATIENT INSTRUCTIONS
PHYSICIAN ORDERS AND DISCHARGE INSTRUCTIONS    Wound cleansing:     Do not scrub or use excessive force.   Wash hands with soap and water before and after dressing changes.   Prior to applying a clean dressing, cleanse wound with normal saline,               wound cleanser, or mild soap and water.    Ask the physician or nurse before getting the wound(s) wet in a shower      Wound Care Notes:         Orders for this week:  2/13/2024    FAX ORDERS TO [place]! Send Patient with wound vac supplies to each appointment to wound care center    -Need to send an aide with client during [facility] visits due to confusion.       [place] to order a wound vac for right posterior lower leg on 1/23/24.    Right Lateral ankle and Left & Right Heels - paint with betadine. Cover with optifoam (or ABD) Heel cup. Leave in place for 1 week.    Left 3rd Toe: Healed 2/13/24    Right Anterior Lower Leg Wounds - Apply anasept and Stimulen to wound beds. Cover with super absorber. Wrap with Coban 2 (enclose toes). Change with vac dressing changes.    Right Posterior Lower Leg Wound - Wash with soap and water, pat dry.   WOUND VAC THERAPY: Right Posterior Lower Leg Wound (apply vac when available)  SKIN PREP OR MASTISOL AND DUODERM TO PERIWOUND FOR PROTECTION.   Apply stimulen powder and sorbact to wound bed   APPLY BLACK FOAM TO WOUND. SECURE VAC DRESSING WITH DRAPE.  SET WOUND VAC  CONTINUOUS SUCTION. CANISTER CHANGE WEEKLY OR ACCORDING TO VOLUME OF DRAINAGE.  WOUND VAC DRESSING TO BE CHANGED BY FACILITY THURSDAYS AND SATURDAYS  WOUND CARE CENTER WILL CHANGE ON TUESDAYS (Need to send vac supplies - black foam and canister to appointments)    Place ca alginate between toes and wrap bilateral legs Coban 2 to right leg (Coflex full to left leg) (enclose toes).   Left leg wrap can stay on for 1 week, Right will need to be changed with vac dressing changes.   DO NOT COMPRESS VAC TUBING OR SUCTION PIECE AGAINST SKIN - CUT A HOLE/WRAP

## 2024-02-13 NOTE — PROGRESS NOTES
Wound Care Center Progress Note With Procedure    Westley Rios  AGE: 89 y.o.   GENDER: male  : 1926  EPISODE DATE:  2024     Subjective:     Chief Complaint   Patient presents with    Wound Check     Right leg          HISTORY of PRESENT ILLNESS      Westley Rios is a 89 y.o. male who presents today for wound evaluation of Acute non-healing surgical ulcer(s) of the right calf s/p I&D and debridement of necrotizing soft tissue infection.  The ulcer is of moderate severity.  The underlying cause of the wound is abscess.    Wound Pain Timing/Severity: constant  Quality of pain: sharp  Severity of pain:  2 / 10   Modifying Factors: none  Associated Signs/Symptoms: pain       24   Wound stable   No new complaints today.   Up to full wrap         PAST MEDICAL HISTORY        Diagnosis Date    Arthritis     CAD (coronary artery disease)     H/O Doppler carotid ultrasound 2020    Mod -Severe disease Right ICA, Mild disease Left ICA.    H/O echocardiogram 2020    EF 55-60%, MOD AS, Mild: PHTN, MR, TR & AR. Aortic root 3.8 cm.    Hx of Doppler echocardiogram 2022    EF 50-55% Mod LV hypertrophy. Mildly dialted LA. Heavily calcified aortic valve iwht mod to severe AS. Mitral annular calcification. Mild TR and MR. Mod AR. Dilated aortic root.    Hyperlipidemia     Hypertension     Spinal stenosis        PAST SURGICAL HISTORY    Past Surgical History:   Procedure Laterality Date    BACK SURGERY      CAROTID ENDARTERECTOMY      CATARACT REMOVAL      CHOLECYSTECTOMY      CORONARY ANGIOPLASTY WITH STENT PLACEMENT      FOOT DEBRIDEMENT Right 2024    EXCISIONAL DEBRIDEMENT RIGHT LOWER EXTREMITY WOUND AND WOUND VAC PLACEMENT performed by Cristian Bermudez DO at [facility] OR    HIP SURGERY Right 2023    HIP IM NAIL ANJU INSERTION performed by Placido White DO at [facility] OR    IR IVC FILTER PLACEMENT W IMAGING  1/3/2024    IR IVC FILTER PLACEMENT W IMAGING 1/3/2024 [facility] SPECIAL PROCEDURES

## 2024-02-15 ENCOUNTER — HOSPITAL ENCOUNTER (OUTPATIENT)
Age: 89
Setting detail: SPECIMEN
Discharge: HOME | End: 2024-02-15

## 2024-02-15 LAB
ALBUMIN SERPL-MCNC: 2.8 GM/DL (ref 3.4–5)
ALP BLD-CCNC: 81 IU/L (ref 40–129)
ALT SERPL-CCNC: 13 U/L (ref 10–40)
ANION GAP SERPL CALCULATED.3IONS-SCNC: 10 MMOL/L (ref 7–16)
AST SERPL-CCNC: 24 IU/L (ref 15–37)
BILIRUB SERPL-MCNC: 0.2 MG/DL (ref 0–1)
BUN SERPL-MCNC: 31 MG/DL (ref 6–23)
CALCIUM SERPL-MCNC: 8 MG/DL (ref 8.3–10.6)
CHLORIDE BLD-SCNC: 109 MMOL/L (ref 99–110)
CO2: 24 MMOL/L (ref 21–32)
CREAT SERPL-MCNC: 1.5 MG/DL (ref 0.9–1.3)
GFR SERPL CREATININE-BSD FRML MDRD: 42 ML/MIN/1.73M2
GLUCOSE SERPL-MCNC: 82 MG/DL (ref 70–99)
HCT VFR BLD CALC: 27.4 % (ref 42–52)
HEMOGLOBIN: 7.8 GM/DL (ref 13.5–18)
MCH RBC QN AUTO: 27.5 PG (ref 27–31)
MCHC RBC AUTO-ENTMCNC: 28.5 % (ref 32–36)
MCV RBC AUTO: 96.5 FL (ref 78–100)
PDW BLD-RTO: 19.7 % (ref 11.7–14.9)
PLATELET # BLD: 345 K/CU MM (ref 140–440)
PMV BLD AUTO: 9.6 FL (ref 7.5–11.1)
POTASSIUM SERPL-SCNC: 4.9 MMOL/L (ref 3.5–5.1)
RBC # BLD: 2.84 M/CU MM (ref 4.6–6.2)
SODIUM BLD-SCNC: 143 MMOL/L (ref 135–145)
TOTAL PROTEIN: 5.4 GM/DL (ref 6.4–8.2)
WBC # BLD: 6.9 K/CU MM (ref 4–10.5)

## 2024-02-15 PROCEDURE — 85027 COMPLETE CBC AUTOMATED: CPT

## 2024-02-15 PROCEDURE — 80053 COMPREHEN METABOLIC PANEL: CPT

## 2024-02-15 PROCEDURE — 36415 COLL VENOUS BLD VENIPUNCTURE: CPT

## 2024-02-20 ENCOUNTER — HOSPITAL ENCOUNTER (OUTPATIENT)
Dept: WOUND CARE | Age: 89
Discharge: HOME | End: 2024-02-20
Attending: SURGERY
Payer: MEDICARE

## 2024-02-20 VITALS
SYSTOLIC BLOOD PRESSURE: 104 MMHG | TEMPERATURE: 96.5 F | HEART RATE: 73 BPM | RESPIRATION RATE: 14 BRPM | DIASTOLIC BLOOD PRESSURE: 55 MMHG

## 2024-02-20 DIAGNOSIS — L02.419 CELLULITIS AND ABSCESS OF LEG: Primary | ICD-10-CM

## 2024-02-20 DIAGNOSIS — L03.119 CELLULITIS AND ABSCESS OF LEG: Primary | ICD-10-CM

## 2024-02-20 DIAGNOSIS — T81.89XD NON-HEALING SURGICAL WOUND, SUBSEQUENT ENCOUNTER: ICD-10-CM

## 2024-02-20 PROCEDURE — 11043 DBRDMT MUSC&/FSCA 1ST 20/<: CPT | Performed by: SURGERY

## 2024-02-20 PROCEDURE — 97606 NEG PRS WND THER DME>50 SQCM: CPT

## 2024-02-20 PROCEDURE — 11043 DBRDMT MUSC&/FSCA 1ST 20/<: CPT

## 2024-02-20 PROCEDURE — 11045 DBRDMT SUBQ TISS EACH ADDL: CPT

## 2024-02-20 PROCEDURE — 11046 DBRDMT MUSC&/FSCA EA ADDL: CPT

## 2024-02-20 PROCEDURE — 11046 DBRDMT MUSC&/FSCA EA ADDL: CPT | Performed by: SURGERY

## 2024-02-20 PROCEDURE — 11042 DBRDMT SUBQ TIS 1ST 20SQCM/<: CPT

## 2024-02-20 ASSESSMENT — PAIN SCALES - GENERAL: PAINLEVEL_OUTOF10: 0

## 2024-02-20 NOTE — PROGRESS NOTES
Wound Care Center Progress Note With Procedure    Westley iRos  AGE: 89 y.o.   GENDER: male  : 1926  EPISODE DATE:  2024     Subjective:     Chief Complaint   Patient presents with    Wound Check     BLE         HISTORY of PRESENT ILLNESS      Westley Rios is a 89 y.o. male who presents today for wound evaluation of Acute non-healing surgical ulcer(s) of the right calf s/p I&D and debridement of necrotizing soft tissue infection.  The ulcer is of moderate severity.  The underlying cause of the wound is abscess.    Wound Pain Timing/Severity: constant  Quality of pain: sharp  Severity of pain:  2 / 10   Modifying Factors: none  Associated Signs/Symptoms: pain       24   Wound stable   No new complaints today.   Up to full wrap    24   Tolerated wrap.   No new concerns.   Discussed with pt and family regarding skin graft. Wound like to hold off on surgery for now.   Patient also not ideal candidate due to thin skin and comorbidities.          PAST MEDICAL HISTORY        Diagnosis Date    Arthritis     CAD (coronary artery disease)     H/O Doppler carotid ultrasound 2020    Mod -Severe disease Right ICA, Mild disease Left ICA.    H/O echocardiogram 2020    EF 55-60%, MOD AS, Mild: PHTN, MR, TR & AR. Aortic root 3.8 cm.    Hx of Doppler echocardiogram 2022    EF 50-55% Mod LV hypertrophy. Mildly dialted LA. Heavily calcified aortic valve iwht mod to severe AS. Mitral annular calcification. Mild TR and MR. Mod AR. Dilated aortic root.    Hyperlipidemia     Hypertension     Spinal stenosis        PAST SURGICAL HISTORY    Past Surgical History:   Procedure Laterality Date    BACK SURGERY      CAROTID ENDARTERECTOMY      CATARACT REMOVAL      CHOLECYSTECTOMY      CORONARY ANGIOPLASTY WITH STENT PLACEMENT      FOOT DEBRIDEMENT Right 2024    EXCISIONAL DEBRIDEMENT RIGHT LOWER EXTREMITY WOUND AND WOUND VAC PLACEMENT performed by Cristian Bermudez DO at [facility] OR    HIP SURGERY

## 2024-02-20 NOTE — PATIENT INSTRUCTIONS
quantity when ordering supplies.      []   Nurse Visit:   Follow Up Instructions: At the Wound Care Center in 1 week   Primary Wound Care Provider: Dr Bermudez   Call  for any questions or concerns.  Central [phone] for imaging and lab work

## 2024-02-22 ENCOUNTER — HOSPITAL ENCOUNTER (OUTPATIENT)
Age: 89
Setting detail: SPECIMEN
Discharge: HOME | End: 2024-02-22

## 2024-02-22 LAB
ALBUMIN SERPL-MCNC: 2.7 GM/DL (ref 3.4–5)
ALP BLD-CCNC: 76 IU/L (ref 40–129)
ALT SERPL-CCNC: 7 U/L (ref 10–40)
ANION GAP SERPL CALCULATED.3IONS-SCNC: 10 MMOL/L (ref 7–16)
AST SERPL-CCNC: 14 IU/L (ref 15–37)
BILIRUB SERPL-MCNC: 0.2 MG/DL (ref 0–1)
BUN SERPL-MCNC: 30 MG/DL (ref 6–23)
CALCIUM SERPL-MCNC: 7.7 MG/DL (ref 8.3–10.6)
CHLORIDE BLD-SCNC: 109 MMOL/L (ref 99–110)
CO2: 23 MMOL/L (ref 21–32)
CREAT SERPL-MCNC: 1.7 MG/DL (ref 0.9–1.3)
GFR SERPL CREATININE-BSD FRML MDRD: 36 ML/MIN/1.73M2
GLUCOSE SERPL-MCNC: 76 MG/DL (ref 70–99)
HCT VFR BLD CALC: 24 % (ref 42–52)
HEMOGLOBIN: 6.9 GM/DL (ref 13.5–18)
MCH RBC QN AUTO: 27.5 PG (ref 27–31)
MCHC RBC AUTO-ENTMCNC: 28.8 % (ref 32–36)
MCV RBC AUTO: 95.6 FL (ref 78–100)
PDW BLD-RTO: 19.8 % (ref 11.7–14.9)
PLATELET # BLD: 353 K/CU MM (ref 140–440)
PMV BLD AUTO: 9.5 FL (ref 7.5–11.1)
POTASSIUM SERPL-SCNC: 4.6 MMOL/L (ref 3.5–5.1)
RBC # BLD: 2.51 M/CU MM (ref 4.6–6.2)
SODIUM BLD-SCNC: 142 MMOL/L (ref 135–145)
TOTAL PROTEIN: 5.3 GM/DL (ref 6.4–8.2)
WBC # BLD: 7.7 K/CU MM (ref 4–10.5)

## 2024-02-22 PROCEDURE — 85027 COMPLETE CBC AUTOMATED: CPT

## 2024-02-22 PROCEDURE — 36415 COLL VENOUS BLD VENIPUNCTURE: CPT

## 2024-02-22 PROCEDURE — 80053 COMPREHEN METABOLIC PANEL: CPT

## 2024-02-23 ENCOUNTER — HOSPITAL ENCOUNTER (OUTPATIENT)
Age: 89
Setting detail: SPECIMEN
Discharge: HOME | End: 2024-02-23

## 2024-02-23 LAB
ABO/RH: NORMAL
ANTIBODY SCREEN: NEGATIVE
COMPONENT: NORMAL
CROSSMATCH RESULT: NORMAL
HCT VFR BLD CALC: 25.9 % (ref 42–52)
HEMOGLOBIN: 7.3 GM/DL (ref 13.5–18)
STATUS: NORMAL
TRANSFUSION STATUS: NORMAL
UNIT DIVISION: 0
UNIT NUMBER: NORMAL

## 2024-02-23 PROCEDURE — 36415 COLL VENOUS BLD VENIPUNCTURE: CPT

## 2024-02-23 PROCEDURE — 86900 BLOOD TYPING SEROLOGIC ABO: CPT

## 2024-02-23 PROCEDURE — 86850 RBC ANTIBODY SCREEN: CPT

## 2024-02-23 PROCEDURE — 86901 BLOOD TYPING SEROLOGIC RH(D): CPT

## 2024-02-23 PROCEDURE — 85014 HEMATOCRIT: CPT

## 2024-02-23 PROCEDURE — 85018 HEMOGLOBIN: CPT

## 2024-02-26 ENCOUNTER — HOSPITAL ENCOUNTER (OUTPATIENT)
Dept: INFUSION THERAPY | Age: 89
Setting detail: INFUSION SERIES
End: 2024-02-26

## 2024-02-27 ENCOUNTER — HOSPITAL ENCOUNTER (OUTPATIENT)
Dept: WOUND CARE | Age: 89
Discharge: HOME | End: 2024-02-27
Attending: SURGERY
Payer: MEDICARE

## 2024-02-27 VITALS — DIASTOLIC BLOOD PRESSURE: 58 MMHG | TEMPERATURE: 97 F | SYSTOLIC BLOOD PRESSURE: 115 MMHG | HEART RATE: 71 BPM

## 2024-02-27 DIAGNOSIS — T81.89XD NON-HEALING SURGICAL WOUND, SUBSEQUENT ENCOUNTER: ICD-10-CM

## 2024-02-27 DIAGNOSIS — Z87.39 H/O NECROTISING FASCIITIS: Primary | ICD-10-CM

## 2024-02-27 PROCEDURE — 11046 DBRDMT MUSC&/FSCA EA ADDL: CPT

## 2024-02-27 PROCEDURE — 11043 DBRDMT MUSC&/FSCA 1ST 20/<: CPT | Performed by: SURGERY

## 2024-02-27 PROCEDURE — 97606 NEG PRS WND THER DME>50 SQCM: CPT

## 2024-02-27 PROCEDURE — 11046 DBRDMT MUSC&/FSCA EA ADDL: CPT | Performed by: SURGERY

## 2024-02-27 PROCEDURE — 11043 DBRDMT MUSC&/FSCA 1ST 20/<: CPT

## 2024-02-27 RX ORDER — LIDOCAINE 40 MG/G
CREAM TOPICAL ONCE
OUTPATIENT
Start: 2024-02-27 | End: 2024-02-27

## 2024-02-27 RX ORDER — LIDOCAINE 50 MG/G
OINTMENT TOPICAL ONCE
OUTPATIENT
Start: 2024-02-27 | End: 2024-02-27

## 2024-02-27 RX ORDER — IBUPROFEN 200 MG
TABLET ORAL ONCE
OUTPATIENT
Start: 2024-02-27 | End: 2024-02-27

## 2024-02-27 RX ORDER — LIDOCAINE HYDROCHLORIDE 40 MG/ML
SOLUTION TOPICAL ONCE
OUTPATIENT
Start: 2024-02-27 | End: 2024-02-27

## 2024-02-27 RX ORDER — LIDOCAINE HYDROCHLORIDE 20 MG/ML
JELLY TOPICAL ONCE
OUTPATIENT
Start: 2024-02-27 | End: 2024-02-27

## 2024-02-27 RX ORDER — BACITRACIN ZINC AND POLYMYXIN B SULFATE 500; 1000 [USP'U]/G; [USP'U]/G
OINTMENT TOPICAL ONCE
OUTPATIENT
Start: 2024-02-27 | End: 2024-02-27

## 2024-02-27 RX ORDER — SODIUM CHLOR/HYPOCHLOROUS ACID 0.033 %
SOLUTION, IRRIGATION IRRIGATION ONCE
OUTPATIENT
Start: 2024-02-27 | End: 2024-02-27

## 2024-02-27 RX ORDER — CLOBETASOL PROPIONATE 0.5 MG/G
OINTMENT TOPICAL ONCE
OUTPATIENT
Start: 2024-02-27 | End: 2024-02-27

## 2024-02-27 RX ORDER — TRIAMCINOLONE ACETONIDE 1 MG/G
OINTMENT TOPICAL ONCE
OUTPATIENT
Start: 2024-02-27 | End: 2024-02-27

## 2024-02-27 RX ORDER — BETAMETHASONE DIPROPIONATE 0.5 MG/G
CREAM TOPICAL ONCE
OUTPATIENT
Start: 2024-02-27 | End: 2024-02-27

## 2024-02-27 RX ORDER — BACITRACIN ZINC 500 [USP'U]/G
OINTMENT TOPICAL ONCE
OUTPATIENT
Start: 2024-02-27 | End: 2024-02-27

## 2024-02-27 RX ORDER — GENTAMICIN SULFATE 1 MG/G
OINTMENT TOPICAL ONCE
OUTPATIENT
Start: 2024-02-27 | End: 2024-02-27

## 2024-02-27 ASSESSMENT — PAIN SCALES - GENERAL: PAINLEVEL_OUTOF10: 0

## 2024-02-27 NOTE — PATIENT INSTRUCTIONS
PHYSICIAN ORDERS AND DISCHARGE INSTRUCTIONS    Wound cleansing:     Do not scrub or use excessive force.   Wash hands with soap and water before and after dressing changes.   Prior to applying a clean dressing, cleanse wound with normal saline,               wound cleanser, or mild soap and water.    Ask the physician or nurse before getting the wound(s) wet in a shower      Wound Care Notes:         Orders for this week:  2/27/2024    FAX ORDERS TO [place]! Send Patient with wound vac supplies to each appointment to wound care center    -Need to send an aide with client during [facility] visits due to confusion.       [place] to order a wound vac for right posterior lower leg on 1/23/24.    Right Lateral ankle and Left & Right Heels - paint with betadine. Cover with optifoam (or ABD) Heel cup. Leave in place for 1 week.    Right Anterior Lower Leg Wounds - Apply anasept and Stimulen to wound beds. Cover with super absorber. Wrap with Profore (Fourflex) (enclose toes). Change with vac dressing changes.    Right Posterior Lower Leg Wound - Wash with soap and water, pat dry.   WOUND VAC THERAPY: Right Posterior Lower Leg Wound (apply vac when available)  SKIN PREP OR MASTISOL AND DUODERM TO PERIWOUND FOR PROTECTION.   Apply stimulen powder and sorbact to wound bed   APPLY BLACK FOAM TO WOUND. SECURE VAC DRESSING WITH DRAPE.  SET WOUND VAC  CONTINUOUS SUCTION. CANISTER CHANGE WEEKLY OR ACCORDING TO VOLUME OF DRAINAGE.  WOUND VAC DRESSING TO BE CHANGED BY FACILITY THURSDAYS AND SATURDAYS  WOUND CARE CENTER WILL CHANGE ON TUESDAYS (Need to send vac supplies - black foam and canister to appointments)    Place ca alginate between toes and wrap bilateral legs: Profore (Fourflex) to right leg (Coflex full to left leg) (enclose toes).   Left leg wrap can stay on for 1 week, Right will need to be changed with vac dressing changes.   DO NOT COMPRESS VAC TUBING OR SUCTION PIECE AGAINST SKIN - CUT A HOLE/WRAP

## 2024-02-27 NOTE — PROGRESS NOTES
Wound Care Center Progress Note With Procedure    Westley Rios  AGE: 89 y.o.   GENDER: male  : 1926  EPISODE DATE:  2024     Subjective:     Chief Complaint   Patient presents with    Wound Check     leg         HISTORY of PRESENT ILLNESS      Westley Rios is a 89 y.o. male who presents today for wound evaluation of Acute non-healing surgical ulcer(s) of the right calf s/p I&D and debridement of necrotizing soft tissue infection.  The ulcer is of moderate severity.  The underlying cause of the wound is abscess.    Wound Pain Timing/Severity: constant  Quality of pain: sharp  Severity of pain:  2 / 10   Modifying Factors: none  Associated Signs/Symptoms: pain       24   Wound stable   No new complaints today.   Up to full wrap    24   Tolerated wrap.   No new concerns.   Discussed with pt and family regarding skin graft. Wound like to hold off on surgery for now.   Patient also not ideal candidate due to thin skin and comorbidities.     24   Tolerating debridement. Wound clean with granulation tissue at base.   LE more edematous today.   Discussed elevating wound more and will up to pro four wraps.   Family declining proceeding with skin graft for now. He is also not a good candidate for surgery at this time.  Offered to send for consult for graft and they declined.      PAST MEDICAL HISTORY        Diagnosis Date    Arthritis     CAD (coronary artery disease)     H/O Doppler carotid ultrasound 2020    Mod -Severe disease Right ICA, Mild disease Left ICA.    H/O echocardiogram 2020    EF 55-60%, MOD AS, Mild: PHTN, MR, TR & AR. Aortic root 3.8 cm.    Hx of Doppler echocardiogram 2022    EF 50-55% Mod LV hypertrophy. Mildly dialted LA. Heavily calcified aortic valve iwht mod to severe AS. Mitral annular calcification. Mild TR and MR. Mod AR. Dilated aortic root.    Hyperlipidemia     Hypertension     Spinal stenosis        PAST SURGICAL HISTORY    Past Surgical

## 2024-02-27 NOTE — PROGRESS NOTES
Negative Pressure    NAME:  Westley Rios  YOB: 1926  MEDICAL RECORD NUMBER:  [id number]  DATE:  2/27/2024    Applied Negative Pressure to left leg wound(s)/ulcer(s).  [x] Applied skin barrier prep to tiffany-wound.   [x] Cut strips of plastic drape to picture frame wound so that tiffany-wound is     covered with the drape.   [x] If bridging dressing to less prominent site, cover any intact skin that will come in contact with the Negative Pressure Therapy sponge, gauze or channel drain with plastic drape.  The sponge should never touch intact skin.   [x] Cut sponge, gauze or channel drain to size which will fit into the wound/ulcer bed without being forced.   [x] Be sure the sponge is large enough to hold the entire round plastic flange which is attached to the tubing.  Never allow flange to be larger than the sponge or it will produce suction damaging intact skin.  Total number of individual pieces of foam used within the wound bed: 1    [x] If bridging the dressing away from the primary site, be sure the bridge leads to a piece of sponge large enough to hold the entire flange without allowing any of the flange to overlap onto intact skin.   [x] Covered sponge, gauze or channel drain with plastic drape.   [x] Cut a hole in this plastic drape directly over the sponge the same size as the plastic drain tubing.   [x] Removed plastic liner from flange and apply it directly over the hole you cut.   [x] Removed the plastic cover from the flange.   [x] Attached the tubing to the wound/ulcer Negative Pressure Therapy and turn it on to be sure a vacuum is created and that there are no leaks.   [x] If air leaks occur, use plastic drape to patch them.   [x] Secured Negative Pressure Therapy dressing with ace wrap loosely if located on an extremity. Maintain tubing outside of ace wrap. Tubing must not exert pressure on intact skin.    Applied per  Guidelines      Electronically signed by Marcelino Norman RN

## 2024-02-29 ENCOUNTER — HOSPITAL ENCOUNTER (OUTPATIENT)
Age: 89
Setting detail: SPECIMEN
Discharge: HOME | End: 2024-02-29

## 2024-02-29 LAB
ALBUMIN SERPL-MCNC: 2.6 GM/DL (ref 3.4–5)
ALP BLD-CCNC: 73 IU/L (ref 40–128)
ALT SERPL-CCNC: 6 U/L (ref 10–40)
ANION GAP SERPL CALCULATED.3IONS-SCNC: 10 MMOL/L (ref 7–16)
AST SERPL-CCNC: 13 IU/L (ref 15–37)
BILIRUB SERPL-MCNC: 0.3 MG/DL (ref 0–1)
BUN SERPL-MCNC: 36 MG/DL (ref 6–23)
CALCIUM SERPL-MCNC: 8.2 MG/DL (ref 8.3–10.6)
CHLORIDE BLD-SCNC: 105 MMOL/L (ref 99–110)
CO2: 22 MMOL/L (ref 21–32)
CREAT SERPL-MCNC: 1.7 MG/DL (ref 0.9–1.3)
GFR SERPL CREATININE-BSD FRML MDRD: 36 ML/MIN/1.73M2
GLUCOSE SERPL-MCNC: 87 MG/DL (ref 70–99)
HCT VFR BLD CALC: 24.3 % (ref 42–52)
HEMOGLOBIN: 7 GM/DL (ref 13.5–18)
MCH RBC QN AUTO: 26.6 PG (ref 27–31)
MCHC RBC AUTO-ENTMCNC: 28.8 % (ref 32–36)
MCV RBC AUTO: 92.4 FL (ref 78–100)
PDW BLD-RTO: 18.9 % (ref 11.7–14.9)
PLATELET # BLD: 362 K/CU MM (ref 140–440)
PMV BLD AUTO: 9.7 FL (ref 7.5–11.1)
POTASSIUM SERPL-SCNC: 4.4 MMOL/L (ref 3.5–5.1)
RBC # BLD: 2.63 M/CU MM (ref 4.6–6.2)
SODIUM BLD-SCNC: 137 MMOL/L (ref 135–145)
TOTAL PROTEIN: 5.2 GM/DL (ref 6.4–8.2)
WBC # BLD: 6.7 K/CU MM (ref 4–10.5)

## 2024-02-29 PROCEDURE — 85027 COMPLETE CBC AUTOMATED: CPT

## 2024-02-29 PROCEDURE — 36415 COLL VENOUS BLD VENIPUNCTURE: CPT

## 2024-02-29 PROCEDURE — 80053 COMPREHEN METABOLIC PANEL: CPT

## 2024-03-01 ENCOUNTER — HOSPITAL ENCOUNTER (OUTPATIENT)
Age: 89
Setting detail: SPECIMEN
Discharge: HOME | End: 2024-03-01

## 2024-03-01 LAB
HCT VFR BLD CALC: 27.5 % (ref 42–52)
HEMOGLOBIN: 7.9 GM/DL (ref 13.5–18)

## 2024-03-01 PROCEDURE — 85018 HEMOGLOBIN: CPT

## 2024-03-01 PROCEDURE — 85014 HEMATOCRIT: CPT

## 2024-03-01 PROCEDURE — 36415 COLL VENOUS BLD VENIPUNCTURE: CPT

## 2024-03-05 ENCOUNTER — HOSPITAL ENCOUNTER (OUTPATIENT)
Dept: WOUND CARE | Age: 89
Discharge: HOME | End: 2024-03-05
Attending: SURGERY
Payer: MEDICARE

## 2024-03-05 VITALS
HEART RATE: 60 BPM | TEMPERATURE: 99.1 F | SYSTOLIC BLOOD PRESSURE: 106 MMHG | RESPIRATION RATE: 16 BRPM | DIASTOLIC BLOOD PRESSURE: 49 MMHG

## 2024-03-05 DIAGNOSIS — L03.119 CELLULITIS AND ABSCESS OF LEG: ICD-10-CM

## 2024-03-05 DIAGNOSIS — L02.419 CELLULITIS AND ABSCESS OF LEG: ICD-10-CM

## 2024-03-05 DIAGNOSIS — L97.312 ANKLE ULCER, RIGHT, WITH FAT LAYER EXPOSED (HCC): Primary | ICD-10-CM

## 2024-03-05 PROCEDURE — 11046 DBRDMT MUSC&/FSCA EA ADDL: CPT | Performed by: SURGERY

## 2024-03-05 PROCEDURE — 11043 DBRDMT MUSC&/FSCA 1ST 20/<: CPT | Performed by: SURGERY

## 2024-03-05 PROCEDURE — 97606 NEG PRS WND THER DME>50 SQCM: CPT

## 2024-03-05 PROCEDURE — 6370000000 HC RX 637 (ALT 250 FOR IP): Performed by: SURGERY

## 2024-03-05 PROCEDURE — 11043 DBRDMT MUSC&/FSCA 1ST 20/<: CPT

## 2024-03-05 PROCEDURE — 11046 DBRDMT MUSC&/FSCA EA ADDL: CPT

## 2024-03-05 RX ORDER — GENTAMICIN SULFATE 1 MG/G
OINTMENT TOPICAL ONCE
Status: COMPLETED | OUTPATIENT
Start: 2024-03-05 | End: 2024-03-05

## 2024-03-05 RX ORDER — BETAMETHASONE DIPROPIONATE 0.5 MG/G
CREAM TOPICAL ONCE
OUTPATIENT
Start: 2024-03-05 | End: 2024-03-05

## 2024-03-05 RX ORDER — BACITRACIN ZINC AND POLYMYXIN B SULFATE 500; 1000 [USP'U]/G; [USP'U]/G
OINTMENT TOPICAL ONCE
OUTPATIENT
Start: 2024-03-05 | End: 2024-03-05

## 2024-03-05 RX ORDER — GENTAMICIN SULFATE 1 MG/G
OINTMENT TOPICAL ONCE
OUTPATIENT
Start: 2024-03-05 | End: 2024-03-05

## 2024-03-05 RX ORDER — LIDOCAINE 40 MG/G
CREAM TOPICAL ONCE
OUTPATIENT
Start: 2024-03-05 | End: 2024-03-05

## 2024-03-05 RX ORDER — LIDOCAINE HYDROCHLORIDE 40 MG/ML
SOLUTION TOPICAL ONCE
OUTPATIENT
Start: 2024-03-05 | End: 2024-03-05

## 2024-03-05 RX ORDER — CLOBETASOL PROPIONATE 0.5 MG/G
OINTMENT TOPICAL ONCE
OUTPATIENT
Start: 2024-03-05 | End: 2024-03-05

## 2024-03-05 RX ORDER — LIDOCAINE 50 MG/G
OINTMENT TOPICAL ONCE
OUTPATIENT
Start: 2024-03-05 | End: 2024-03-05

## 2024-03-05 RX ORDER — IBUPROFEN 200 MG
TABLET ORAL ONCE
OUTPATIENT
Start: 2024-03-05 | End: 2024-03-05

## 2024-03-05 RX ORDER — TRIAMCINOLONE ACETONIDE 1 MG/G
OINTMENT TOPICAL ONCE
OUTPATIENT
Start: 2024-03-05 | End: 2024-03-05

## 2024-03-05 RX ORDER — LIDOCAINE HYDROCHLORIDE 20 MG/ML
JELLY TOPICAL ONCE
OUTPATIENT
Start: 2024-03-05 | End: 2024-03-05

## 2024-03-05 RX ORDER — BACITRACIN ZINC 500 [USP'U]/G
OINTMENT TOPICAL ONCE
OUTPATIENT
Start: 2024-03-05 | End: 2024-03-05

## 2024-03-05 RX ORDER — SODIUM CHLOR/HYPOCHLOROUS ACID 0.033 %
SOLUTION, IRRIGATION IRRIGATION ONCE
OUTPATIENT
Start: 2024-03-05 | End: 2024-03-05

## 2024-03-05 RX ADMIN — COLLAGENASE SANTYL: 250 OINTMENT TOPICAL at 11:40

## 2024-03-05 RX ADMIN — GENTAMICIN SULFATE: 1 OINTMENT TOPICAL at 11:40

## 2024-03-05 ASSESSMENT — PAIN SCALES - GENERAL: PAINLEVEL_OUTOF10: 0

## 2024-03-05 NOTE — PATIENT INSTRUCTIONS
PHYSICIAN ORDERS AND DISCHARGE INSTRUCTIONS    Wound cleansing:     Do not scrub or use excessive force.   Wash hands with soap and water before and after dressing changes.   Prior to applying a clean dressing, cleanse wound with normal saline,               wound cleanser, or mild soap and water.    Ask the physician or nurse before getting the wound(s) wet in a shower      Wound Care Notes:         Orders for this week:  3/5/2024    FAX ORDERS TO [place]! Send Patient with wound vac supplies to each appointment to wound care center    -Need to send an aide with client during [facility] visits due to confusion.       [place] to order a wound vac for right posterior lower leg on 1/23/24.    Right Lateral ankle and Left Heels - Apply Santyl and Gentamicin to wound bed. Cover with optifoam (or ABD) Heel cup. Leave in place for 1 week.    Right Anterior Lower Leg Wound - Apply anasept and Stimulen to wound beds. Cover with super absorber. Wrap with Profore (Fourflex) (enclose toes). Change with vac dressing changes.      WOUND VAC THERAPY: Right Posterior Lower Leg Wound - Wash with soap and water, pat dry.   SKIN PREP OR MASTISOL AND DUODERM TO PERIWOUND FOR PROTECTION.   Apply stimulen powder, sorbact, AND BLACK FOAM TO WOUND. SECURE VAC DRESSING WITH DRAPE.  SET WOUND VAC  CONTINUOUS SUCTION. CANISTER CHANGE WEEKLY OR ACCORDING TO VOLUME OF DRAINAGE.    WOUND VAC DRESSING TO BE CHANGED BY FACILITY THURSDAYS AND SATURDAYS  WOUND CARE CENTER WILL CHANGE ON TUESDAYS (Need to send vac supplies - black foam and canister to appointments)    Place ca alginate between toes and wrap bilateral legs: Profore (Fourflex) to right leg (Coflex full to left leg) (enclose toes).   Left leg wrap can stay on for 1 week, Right will need to be changed with vac dressing changes.   DO NOT COMPRESS VAC TUBING OR SUCTION PIECE AGAINST SKIN - CUT A HOLE/WRAP AROUND!    Dispense 30 day quantity when ordering supplies.      []   Nurse

## 2024-03-05 NOTE — PROGRESS NOTES
Wound Care Center Progress Note With Procedure    Westley Rios  AGE: 89 y.o.   GENDER: male  : 1926  EPISODE DATE:  3/5/2024     Subjective:     Chief Complaint   Patient presents with    Wound Check     legs         HISTORY of PRESENT ILLNESS      Westley Rios is a 89 y.o. male who presents today for wound evaluation of Acute non-healing surgical ulcer(s) of the right calf s/p I&D and debridement of necrotizing soft tissue infection.  The ulcer is of moderate severity.  The underlying cause of the wound is abscess.    Wound Pain Timing/Severity: constant  Quality of pain: sharp  Severity of pain:  2 / 10   Modifying Factors: none  Associated Signs/Symptoms: pain       24   Wound stable   No new complaints today.   Up to full wrap    24   Tolerated wrap.   No new concerns.   Discussed with pt and family regarding skin graft. Wound like to hold off on surgery for now.   Patient also not ideal candidate due to thin skin and comorbidities.     24   Tolerating debridement. Wound clean with granulation tissue at base.   LE more edematous today.   Discussed elevating wound more and will up to pro four wraps.   Family declining proceeding with skin graft for now. He is also not a good candidate for surgery at this time.  Offered to send for consult for graft and they declined.      3/5/24   RLE wound stable. Good granulation tissue over wound. Family still down want to pursue skin graft.    Debrided left heal wound   No new concerns.             PAST MEDICAL HISTORY        Diagnosis Date    Arthritis     CAD (coronary artery disease)     H/O Doppler carotid ultrasound 2020    Mod -Severe disease Right ICA, Mild disease Left ICA.    H/O echocardiogram 2020    EF 55-60%, MOD AS, Mild: PHTN, MR, TR & AR. Aortic root 3.8 cm.    Hx of Doppler echocardiogram 2022    EF 50-55% Mod LV hypertrophy. Mildly dialted LA. Heavily calcified aortic valve iwht mod to severe AS. Mitral 
signed by Elisa Pham LPN on 3/5/2024 at 11:41 AM    Nonselective enzymatic debridement performed with Santyl per physician order to wound(s) of the Left Heel and Right Lateral Ankle. Patient tolerated the procedure well.       Multilayer Compression Wrap   (Not Unna) Below the Knee    NAME:  Westley Rios  YOB: 1926  MEDICAL RECORD NUMBER:  [id number]  DATE:  3/5/2024    Multilayer compression wrap: Removed old Multilayer wrap if indicated and wash leg with mild soap/water.  Applied moisturizing agent to dry skin as needed.   Applied primary and secondary dressing as ordered.  Applied multilayered dressing below the knee to right lower leg.  Applied multilayered dressing below the knee to left lower leg.  Instructed patient/caregiver not to remove dressing and to keep it clean and dry.   Instructed patient/caregiver on complications to report to provider, such as pain, numbness in toes, heavy drainage, and slippage of dressing.  Instructed patient on purpose of compression dressing and on activity and exercise recommendations.          Electronically signed by Elisa Pham LPN on 3/5/2024 at 11:41 AM

## 2024-03-07 ENCOUNTER — HOSPITAL ENCOUNTER (OUTPATIENT)
Age: 89
Setting detail: SPECIMEN
Discharge: HOME | End: 2024-03-07

## 2024-03-07 LAB
ALBUMIN SERPL-MCNC: 2.9 GM/DL (ref 3.4–5)
ALP BLD-CCNC: 86 IU/L (ref 40–128)
ALT SERPL-CCNC: 7 U/L (ref 10–40)
ANION GAP SERPL CALCULATED.3IONS-SCNC: 11 MMOL/L (ref 7–16)
AST SERPL-CCNC: 14 IU/L (ref 15–37)
BILIRUB SERPL-MCNC: 0.3 MG/DL (ref 0–1)
BUN SERPL-MCNC: 58 MG/DL (ref 6–23)
CALCIUM SERPL-MCNC: 8.4 MG/DL (ref 8.3–10.6)
CHLORIDE BLD-SCNC: 107 MMOL/L (ref 99–110)
CO2: 21 MMOL/L (ref 21–32)
CREAT SERPL-MCNC: 2.4 MG/DL (ref 0.9–1.3)
GFR SERPL CREATININE-BSD FRML MDRD: 24 ML/MIN/1.73M2
GLUCOSE SERPL-MCNC: 96 MG/DL (ref 70–99)
HCT VFR BLD CALC: 29.5 % (ref 42–52)
HEMOGLOBIN: 8.1 GM/DL (ref 13.5–18)
MCH RBC QN AUTO: 26.1 PG (ref 27–31)
MCHC RBC AUTO-ENTMCNC: 27.5 % (ref 32–36)
MCV RBC AUTO: 95.2 FL (ref 78–100)
PDW BLD-RTO: 19.4 % (ref 11.7–14.9)
PLATELET # BLD: 313 K/CU MM (ref 140–440)
PMV BLD AUTO: 10 FL (ref 7.5–11.1)
POTASSIUM SERPL-SCNC: 4.7 MMOL/L (ref 3.5–5.1)
RBC # BLD: 3.1 M/CU MM (ref 4.6–6.2)
SODIUM BLD-SCNC: 139 MMOL/L (ref 135–145)
TOTAL PROTEIN: 6 GM/DL (ref 6.4–8.2)
WBC # BLD: 9 K/CU MM (ref 4–10.5)

## 2024-03-07 PROCEDURE — 85027 COMPLETE CBC AUTOMATED: CPT

## 2024-03-07 PROCEDURE — 36415 COLL VENOUS BLD VENIPUNCTURE: CPT

## 2024-03-07 PROCEDURE — 80053 COMPREHEN METABOLIC PANEL: CPT

## 2024-03-11 ENCOUNTER — HOSPITAL ENCOUNTER (OUTPATIENT)
Age: 89
Setting detail: SPECIMEN
Discharge: HOME | End: 2024-03-11

## 2024-03-11 LAB
ANION GAP SERPL CALCULATED.3IONS-SCNC: 14 MMOL/L (ref 7–16)
BUN SERPL-MCNC: 70 MG/DL (ref 6–23)
CALCIUM SERPL-MCNC: 8.2 MG/DL (ref 8.3–10.6)
CHLORIDE BLD-SCNC: 106 MMOL/L (ref 99–110)
CO2: 19 MMOL/L (ref 21–32)
CREAT SERPL-MCNC: 2.3 MG/DL (ref 0.9–1.3)
GFR SERPL CREATININE-BSD FRML MDRD: 25 ML/MIN/1.73M2
GLUCOSE SERPL-MCNC: 95 MG/DL (ref 70–99)
POTASSIUM SERPL-SCNC: 5.2 MMOL/L (ref 3.5–5.1)
SODIUM BLD-SCNC: 139 MMOL/L (ref 135–145)

## 2024-03-11 PROCEDURE — 80048 BASIC METABOLIC PNL TOTAL CA: CPT

## 2024-03-11 PROCEDURE — 36415 COLL VENOUS BLD VENIPUNCTURE: CPT

## 2024-03-12 ENCOUNTER — HOSPITAL ENCOUNTER (OUTPATIENT)
Dept: WOUND CARE | Age: 89
Discharge: HOME | End: 2024-03-12
Attending: SURGERY
Payer: MEDICARE

## 2024-03-12 VITALS
RESPIRATION RATE: 16 BRPM | DIASTOLIC BLOOD PRESSURE: 82 MMHG | HEART RATE: 60 BPM | TEMPERATURE: 97.8 F | SYSTOLIC BLOOD PRESSURE: 119 MMHG

## 2024-03-12 DIAGNOSIS — L02.419 CELLULITIS AND ABSCESS OF LEG: ICD-10-CM

## 2024-03-12 DIAGNOSIS — L03.119 CELLULITIS AND ABSCESS OF LEG: ICD-10-CM

## 2024-03-12 DIAGNOSIS — L97.312 ANKLE ULCER, RIGHT, WITH FAT LAYER EXPOSED (HCC): Primary | ICD-10-CM

## 2024-03-12 PROCEDURE — 97606 NEG PRS WND THER DME>50 SQCM: CPT

## 2024-03-12 PROCEDURE — 6370000000 HC RX 637 (ALT 250 FOR IP): Performed by: SURGERY

## 2024-03-12 PROCEDURE — 11046 DBRDMT MUSC&/FSCA EA ADDL: CPT

## 2024-03-12 PROCEDURE — 11043 DBRDMT MUSC&/FSCA 1ST 20/<: CPT

## 2024-03-12 RX ORDER — BACITRACIN ZINC AND POLYMYXIN B SULFATE 500; 1000 [USP'U]/G; [USP'U]/G
OINTMENT TOPICAL ONCE
OUTPATIENT
Start: 2024-03-12 | End: 2024-03-12

## 2024-03-12 RX ORDER — BETAMETHASONE DIPROPIONATE 0.5 MG/G
CREAM TOPICAL ONCE
OUTPATIENT
Start: 2024-03-12 | End: 2024-03-12

## 2024-03-12 RX ORDER — IBUPROFEN 200 MG
TABLET ORAL ONCE
OUTPATIENT
Start: 2024-03-12 | End: 2024-03-12

## 2024-03-12 RX ORDER — TRIAMCINOLONE ACETONIDE 1 MG/G
OINTMENT TOPICAL ONCE
OUTPATIENT
Start: 2024-03-12 | End: 2024-03-12

## 2024-03-12 RX ORDER — LIDOCAINE HYDROCHLORIDE 20 MG/ML
JELLY TOPICAL ONCE
OUTPATIENT
Start: 2024-03-12 | End: 2024-03-12

## 2024-03-12 RX ORDER — LIDOCAINE 40 MG/G
CREAM TOPICAL ONCE
OUTPATIENT
Start: 2024-03-12 | End: 2024-03-12

## 2024-03-12 RX ORDER — BACITRACIN ZINC 500 [USP'U]/G
OINTMENT TOPICAL ONCE
OUTPATIENT
Start: 2024-03-12 | End: 2024-03-12

## 2024-03-12 RX ORDER — GENTAMICIN SULFATE 1 MG/G
OINTMENT TOPICAL ONCE
OUTPATIENT
Start: 2024-03-12 | End: 2024-03-12

## 2024-03-12 RX ORDER — CLOBETASOL PROPIONATE 0.5 MG/G
OINTMENT TOPICAL ONCE
OUTPATIENT
Start: 2024-03-12 | End: 2024-03-12

## 2024-03-12 RX ORDER — SODIUM CHLOR/HYPOCHLOROUS ACID 0.033 %
SOLUTION, IRRIGATION IRRIGATION ONCE
OUTPATIENT
Start: 2024-03-12 | End: 2024-03-12

## 2024-03-12 RX ORDER — LIDOCAINE 50 MG/G
OINTMENT TOPICAL ONCE
OUTPATIENT
Start: 2024-03-12 | End: 2024-03-12

## 2024-03-12 RX ORDER — LIDOCAINE HYDROCHLORIDE 40 MG/ML
SOLUTION TOPICAL ONCE
OUTPATIENT
Start: 2024-03-12 | End: 2024-03-12

## 2024-03-12 RX ORDER — GENTAMICIN SULFATE 1 MG/G
OINTMENT TOPICAL ONCE
Status: COMPLETED | OUTPATIENT
Start: 2024-03-12 | End: 2024-03-12

## 2024-03-12 RX ADMIN — GENTAMICIN SULFATE: 1 OINTMENT TOPICAL at 11:02

## 2024-03-12 RX ADMIN — COLLAGENASE SANTYL: 250 OINTMENT TOPICAL at 11:03

## 2024-03-12 NOTE — PROGRESS NOTES
Multilayer Compression Wrap   (Not Unna) Below the Knee    NAME:  Westley Rios  YOB: 1926  MEDICAL RECORD NUMBER:  [id number]  DATE:  3/12/2024    Multilayer compression wrap: Removed old Multilayer wrap if indicated and wash leg with mild soap/water.  Applied moisturizing agent to dry skin as needed.   Applied primary and secondary dressing as ordered.  Applied multilayered dressing below the knee to right lower leg.  Applied multilayered dressing below the knee to left lower leg.  Instructed patient/caregiver not to remove dressing and to keep it clean and dry.   Instructed patient/caregiver on complications to report to provider, such as pain, numbness in toes, heavy drainage, and slippage of dressing.  Instructed patient on purpose of compression dressing and on activity and exercise recommendations.      Electronically signed by Suha Parish LPN on 3/12/2024 at 11:00 AM  
Nonselective enzymatic debridement performed with Santyl per physician order to wound(s) of the right lateral ankle/ left heel  Patient tolerated the procedure well.   
Guidelines      Electronically signed by Suha Parish LPN on 3/12/2024 at 11:01 AM

## 2024-03-12 NOTE — PATIENT INSTRUCTIONS
when ordering supplies.      []   Nurse Visit:   Follow Up Instructions: At the Wound Care Center in 1 week   Primary Wound Care Provider: Dr Bermudez   Call  for any questions or concerns.  Central [phone] for imaging and lab work

## 2024-03-14 ENCOUNTER — HOSPITAL ENCOUNTER (OUTPATIENT)
Age: 89
Setting detail: SPECIMEN
Discharge: HOME | End: 2024-03-14

## 2024-03-14 LAB
ALBUMIN SERPL-MCNC: 2.7 GM/DL (ref 3.4–5)
ALP BLD-CCNC: 99 IU/L (ref 40–128)
ALT SERPL-CCNC: 6 U/L (ref 10–40)
ANION GAP SERPL CALCULATED.3IONS-SCNC: 13 MMOL/L (ref 7–16)
AST SERPL-CCNC: 12 IU/L (ref 15–37)
BILIRUB SERPL-MCNC: 0.3 MG/DL (ref 0–1)
BUN SERPL-MCNC: 91 MG/DL (ref 6–23)
CALCIUM SERPL-MCNC: 8.3 MG/DL (ref 8.3–10.6)
CHLORIDE BLD-SCNC: 111 MMOL/L (ref 99–110)
CO2: 18 MMOL/L (ref 21–32)
CREAT SERPL-MCNC: 2.5 MG/DL (ref 0.9–1.3)
GFR SERPL CREATININE-BSD FRML MDRD: 23 ML/MIN/1.73M2
GLUCOSE SERPL-MCNC: 101 MG/DL (ref 70–99)
HCT VFR BLD CALC: 25.8 % (ref 42–52)
HEMOGLOBIN: 7.3 GM/DL (ref 13.5–18)
MCH RBC QN AUTO: 25.3 PG (ref 27–31)
MCHC RBC AUTO-ENTMCNC: 28.3 % (ref 32–36)
MCV RBC AUTO: 89.3 FL (ref 78–100)
PDW BLD-RTO: 19.7 % (ref 11.7–14.9)
PLATELET # BLD: 415 K/CU MM (ref 140–440)
PMV BLD AUTO: 10.2 FL (ref 7.5–11.1)
POTASSIUM SERPL-SCNC: 4.8 MMOL/L (ref 3.5–5.1)
RBC # BLD: 2.89 M/CU MM (ref 4.6–6.2)
SODIUM BLD-SCNC: 142 MMOL/L (ref 135–145)
TOTAL PROTEIN: 5.8 GM/DL (ref 6.4–8.2)
WBC # BLD: 11.3 K/CU MM (ref 4–10.5)

## 2024-03-14 PROCEDURE — 85027 COMPLETE CBC AUTOMATED: CPT

## 2024-03-14 PROCEDURE — 36415 COLL VENOUS BLD VENIPUNCTURE: CPT

## 2024-03-14 PROCEDURE — 80053 COMPREHEN METABOLIC PANEL: CPT

## 2024-03-18 ENCOUNTER — HOSPITAL ENCOUNTER (OUTPATIENT)
Age: 89
Setting detail: SPECIMEN
Discharge: HOME | End: 2024-03-18

## 2024-03-18 LAB
ALBUMIN SERPL-MCNC: 2.6 GM/DL (ref 3.4–5)
ALP BLD-CCNC: 92 IU/L (ref 40–129)
ALT SERPL-CCNC: 7 U/L (ref 10–40)
ANION GAP SERPL CALCULATED.3IONS-SCNC: 12 MMOL/L (ref 7–16)
AST SERPL-CCNC: 11 IU/L (ref 15–37)
BILIRUB SERPL-MCNC: 0.2 MG/DL (ref 0–1)
BUN SERPL-MCNC: 78 MG/DL (ref 6–23)
CALCIUM SERPL-MCNC: 8.7 MG/DL (ref 8.3–10.6)
CHLORIDE BLD-SCNC: 112 MMOL/L (ref 99–110)
CO2: 20 MMOL/L (ref 21–32)
CREAT SERPL-MCNC: 2.4 MG/DL (ref 0.9–1.3)
GFR SERPL CREATININE-BSD FRML MDRD: 24 ML/MIN/1.73M2
GLUCOSE SERPL-MCNC: 92 MG/DL (ref 70–99)
HCT VFR BLD CALC: 25.1 % (ref 42–52)
HEMOGLOBIN: 7.2 GM/DL (ref 13.5–18)
MCH RBC QN AUTO: 25.1 PG (ref 27–31)
MCHC RBC AUTO-ENTMCNC: 28.7 % (ref 32–36)
MCV RBC AUTO: 87.5 FL (ref 78–100)
PDW BLD-RTO: 19.9 % (ref 11.7–14.9)
PLATELET # BLD: 488 K/CU MM (ref 140–440)
PMV BLD AUTO: 9.8 FL (ref 7.5–11.1)
POTASSIUM SERPL-SCNC: 5 MMOL/L (ref 3.5–5.1)
RBC # BLD: 2.87 M/CU MM (ref 4.6–6.2)
SODIUM BLD-SCNC: 144 MMOL/L (ref 135–145)
TOTAL PROTEIN: 5.8 GM/DL (ref 6.4–8.2)
WBC # BLD: 10.5 K/CU MM (ref 4–10.5)

## 2024-03-18 PROCEDURE — 80053 COMPREHEN METABOLIC PANEL: CPT

## 2024-03-18 PROCEDURE — 85027 COMPLETE CBC AUTOMATED: CPT

## 2024-03-18 PROCEDURE — 36415 COLL VENOUS BLD VENIPUNCTURE: CPT

## 2024-03-21 ENCOUNTER — HOSPITAL ENCOUNTER (OUTPATIENT)
Age: 89
Setting detail: SPECIMEN
Discharge: HOME | End: 2024-03-21

## 2024-03-21 LAB
ALBUMIN SERPL-MCNC: 2.8 GM/DL (ref 3.4–5)
ALP BLD-CCNC: 95 IU/L (ref 40–128)
ALT SERPL-CCNC: 9 U/L (ref 10–40)
ANION GAP SERPL CALCULATED.3IONS-SCNC: 13 MMOL/L (ref 7–16)
AST SERPL-CCNC: 19 IU/L (ref 15–37)
BILIRUB SERPL-MCNC: 0.2 MG/DL (ref 0–1)
BUN SERPL-MCNC: 91 MG/DL (ref 6–23)
CALCIUM SERPL-MCNC: 8.9 MG/DL (ref 8.3–10.6)
CHLORIDE BLD-SCNC: 107 MMOL/L (ref 99–110)
CO2: 21 MMOL/L (ref 21–32)
CREAT SERPL-MCNC: 2.1 MG/DL (ref 0.9–1.3)
GFR SERPL CREATININE-BSD FRML MDRD: 28 ML/MIN/1.73M2
GLUCOSE SERPL-MCNC: 86 MG/DL (ref 70–99)
HCT VFR BLD CALC: 24.5 % (ref 42–52)
HEMOGLOBIN: 7.1 GM/DL (ref 13.5–18)
MCH RBC QN AUTO: 25.4 PG (ref 27–31)
MCHC RBC AUTO-ENTMCNC: 29 % (ref 32–36)
MCV RBC AUTO: 87.8 FL (ref 78–100)
PDW BLD-RTO: 20 % (ref 11.7–14.9)
PLATELET # BLD: 515 K/CU MM (ref 140–440)
PMV BLD AUTO: 10.1 FL (ref 7.5–11.1)
POTASSIUM SERPL-SCNC: 5.5 MMOL/L (ref 3.5–5.1)
RBC # BLD: 2.79 M/CU MM (ref 4.6–6.2)
SODIUM BLD-SCNC: 141 MMOL/L (ref 135–145)
TOTAL PROTEIN: 5.9 GM/DL (ref 6.4–8.2)
WBC # BLD: 12.2 K/CU MM (ref 4–10.5)

## 2024-03-21 PROCEDURE — 80053 COMPREHEN METABOLIC PANEL: CPT

## 2024-03-21 PROCEDURE — 36415 COLL VENOUS BLD VENIPUNCTURE: CPT

## 2024-03-21 PROCEDURE — 85027 COMPLETE CBC AUTOMATED: CPT

## 2024-03-22 ENCOUNTER — HOSPITAL ENCOUNTER (OUTPATIENT)
Age: 89
Setting detail: SPECIMEN
Discharge: HOME | End: 2024-03-22

## 2024-03-22 LAB
ANION GAP SERPL CALCULATED.3IONS-SCNC: 14 MMOL/L (ref 7–16)
BUN SERPL-MCNC: 87 MG/DL (ref 6–23)
CALCIUM SERPL-MCNC: 8.6 MG/DL (ref 8.3–10.6)
CHLORIDE BLD-SCNC: 106 MMOL/L (ref 99–110)
CO2: 21 MMOL/L (ref 21–32)
CREAT SERPL-MCNC: 2.1 MG/DL (ref 0.9–1.3)
GFR SERPL CREATININE-BSD FRML MDRD: 28 ML/MIN/1.73M2
GLUCOSE SERPL-MCNC: 88 MG/DL (ref 70–99)
HCT VFR BLD CALC: 25.1 % (ref 42–52)
HEMOGLOBIN: 7 GM/DL (ref 13.5–18)
MCH RBC QN AUTO: 24.7 PG (ref 27–31)
MCHC RBC AUTO-ENTMCNC: 27.9 % (ref 32–36)
MCV RBC AUTO: 88.7 FL (ref 78–100)
PDW BLD-RTO: 20 % (ref 11.7–14.9)
PLATELET # BLD: 506 K/CU MM (ref 140–440)
PMV BLD AUTO: 9.9 FL (ref 7.5–11.1)
POTASSIUM SERPL-SCNC: 4.8 MMOL/L (ref 3.5–5.1)
RBC # BLD: 2.83 M/CU MM (ref 4.6–6.2)
SODIUM BLD-SCNC: 141 MMOL/L (ref 135–145)
WBC # BLD: 11.5 K/CU MM (ref 4–10.5)

## 2024-03-22 PROCEDURE — 36415 COLL VENOUS BLD VENIPUNCTURE: CPT

## 2024-03-22 PROCEDURE — 85027 COMPLETE CBC AUTOMATED: CPT

## 2024-03-22 PROCEDURE — 80048 BASIC METABOLIC PNL TOTAL CA: CPT

## 2024-03-26 ENCOUNTER — HOSPITAL ENCOUNTER (OUTPATIENT)
Dept: WOUND CARE | Age: 89
Discharge: HOME | End: 2024-03-26
Attending: SURGERY
Payer: MEDICARE

## 2024-03-26 VITALS
RESPIRATION RATE: 18 BRPM | DIASTOLIC BLOOD PRESSURE: 73 MMHG | TEMPERATURE: 96.2 F | HEART RATE: 61 BPM | SYSTOLIC BLOOD PRESSURE: 135 MMHG

## 2024-03-26 DIAGNOSIS — L97.312 ANKLE ULCER, RIGHT, WITH FAT LAYER EXPOSED (HCC): Primary | ICD-10-CM

## 2024-03-26 DIAGNOSIS — L03.119 CELLULITIS AND ABSCESS OF LEG: ICD-10-CM

## 2024-03-26 DIAGNOSIS — L02.419 CELLULITIS AND ABSCESS OF LEG: ICD-10-CM

## 2024-03-26 PROCEDURE — 11043 DBRDMT MUSC&/FSCA 1ST 20/<: CPT | Performed by: SURGERY

## 2024-03-26 PROCEDURE — 11046 DBRDMT MUSC&/FSCA EA ADDL: CPT

## 2024-03-26 PROCEDURE — 11046 DBRDMT MUSC&/FSCA EA ADDL: CPT | Performed by: SURGERY

## 2024-03-26 PROCEDURE — 11043 DBRDMT MUSC&/FSCA 1ST 20/<: CPT

## 2024-03-26 PROCEDURE — 97606 NEG PRS WND THER DME>50 SQCM: CPT

## 2024-03-26 PROCEDURE — 6370000000 HC RX 637 (ALT 250 FOR IP): Performed by: SURGERY

## 2024-03-26 RX ORDER — LIDOCAINE HYDROCHLORIDE 20 MG/ML
JELLY TOPICAL ONCE
OUTPATIENT
Start: 2024-03-26 | End: 2024-03-26

## 2024-03-26 RX ORDER — GENTAMICIN SULFATE 1 MG/G
OINTMENT TOPICAL ONCE
OUTPATIENT
Start: 2024-03-26 | End: 2024-03-26

## 2024-03-26 RX ORDER — CLOBETASOL PROPIONATE 0.5 MG/G
OINTMENT TOPICAL ONCE
OUTPATIENT
Start: 2024-03-26 | End: 2024-03-26

## 2024-03-26 RX ORDER — BETAMETHASONE DIPROPIONATE 0.5 MG/G
CREAM TOPICAL ONCE
OUTPATIENT
Start: 2024-03-26 | End: 2024-03-26

## 2024-03-26 RX ORDER — TRIAMCINOLONE ACETONIDE 1 MG/G
OINTMENT TOPICAL ONCE
OUTPATIENT
Start: 2024-03-26 | End: 2024-03-26

## 2024-03-26 RX ORDER — BACITRACIN ZINC 500 [USP'U]/G
OINTMENT TOPICAL ONCE
OUTPATIENT
Start: 2024-03-26 | End: 2024-03-26

## 2024-03-26 RX ORDER — LIDOCAINE HYDROCHLORIDE 40 MG/ML
SOLUTION TOPICAL ONCE
OUTPATIENT
Start: 2024-03-26 | End: 2024-03-26

## 2024-03-26 RX ORDER — BACITRACIN ZINC AND POLYMYXIN B SULFATE 500; 1000 [USP'U]/G; [USP'U]/G
OINTMENT TOPICAL ONCE
OUTPATIENT
Start: 2024-03-26 | End: 2024-03-26

## 2024-03-26 RX ORDER — IBUPROFEN 200 MG
TABLET ORAL ONCE
OUTPATIENT
Start: 2024-03-26 | End: 2024-03-26

## 2024-03-26 RX ORDER — GENTAMICIN SULFATE 1 MG/G
OINTMENT TOPICAL ONCE
Status: COMPLETED | OUTPATIENT
Start: 2024-03-26 | End: 2024-03-26

## 2024-03-26 RX ORDER — LIDOCAINE 40 MG/G
CREAM TOPICAL ONCE
OUTPATIENT
Start: 2024-03-26 | End: 2024-03-26

## 2024-03-26 RX ORDER — LIDOCAINE 50 MG/G
OINTMENT TOPICAL ONCE
OUTPATIENT
Start: 2024-03-26 | End: 2024-03-26

## 2024-03-26 RX ORDER — SODIUM CHLOR/HYPOCHLOROUS ACID 0.033 %
SOLUTION, IRRIGATION IRRIGATION ONCE
OUTPATIENT
Start: 2024-03-26 | End: 2024-03-26

## 2024-03-26 RX ADMIN — COLLAGENASE SANTYL: 250 OINTMENT TOPICAL at 11:26

## 2024-03-26 RX ADMIN — GENTAMICIN SULFATE: 1 OINTMENT TOPICAL at 11:28

## 2024-03-26 ASSESSMENT — PAIN SCALES - GENERAL: PAINLEVEL_OUTOF10: 0

## 2024-03-26 NOTE — PROGRESS NOTES
Multilayer Compression Wrap   (Not Unna) Below the Knee    NAME:  Westley Rios  YOB: 1926  MEDICAL RECORD NUMBER:  [id number]  DATE:  3/26/2024    Multilayer compression wrap: Removed old Multilayer wrap if indicated and wash leg with mild soap/water.  Applied moisturizing agent to dry skin as needed.   Applied primary and secondary dressing as ordered.  Applied multilayered dressing below the knee to right lower leg.  Applied multilayered dressing below the knee to left lower leg.  Instructed patient/caregiver not to remove dressing and to keep it clean and dry.   Instructed patient/caregiver on complications to report to provider, such as pain, numbness in toes, heavy drainage, and slippage of dressing.  Instructed patient on purpose of compression dressing and on activity and exercise recommendations.      Electronically signed by Teri Elias RN on 3/26/2024 at 11:29 AM  
Negative Pressure Wound Therapy    NAME:  Westley Rios  YOB: 1926  MEDICAL RECORD NUMBER:  [id number]  DATE:  3/26/2024    Applied Negative Pressure to right posterior leg wound(s)/ulcer(s).  [x] Applied skin barrier prep to tiffany-wound.   [x] Cut strips of plastic drape to picture frame wound so that tiffany-wound is     covered with the drape.   [x] If bridging dressing to less prominent site, cover any intact skin that will come in contact with the Negative Pressure Therapy sponge, gauze or channel drain with plastic drape. The sponge should never touch intact skin.   [x] Cut sponge, gauze or channel drain to size which will fit into the wound/ulcer bed without being forced.   [x] Be sure the sponge is large enough to hold the entire round plastic flange which is attached to the tubing. Never allow flange to be larger than the sponge or it will produce suction damaging intact skin.  Total number of individual pieces of foam used within the wound bed: 1    [x] If bridging the dressing away from the primary site, be sure the bridge leads to a piece of sponge large enough to hold the entire flange without allowing any of the flange to overlap onto intact skin.   [x] Covered sponge, gauze or channel drain with plastic drape.   [x] Cut a hole in this plastic drape directly over the sponge the same size as the plastic drain tubing.   [x] Removed plastic liner from flange and apply it directly over the hole you cut.   [x] Removed the plastic cover from the flange.   [x] Attached the tubing to the wound/ulcer Negative Pressure Therapy and turn it on to be sure a vacuum is created and that there are no leaks.   [x] If air leaks occur, use plastic drape to patch them.   [x] Secured Negative Pressure Therapy dressing with ace wrap loosely if located on an extremity. Maintain tubing outside of ace wrap. Tubing must not exert pressure on intact skin.    Applied per  Guidelines      Electronically 
Nonselective enzymatic debridement performed with Santyl per physician order to wound(s) of the right lateral and left heel.   Patient tolerated the procedure well.   
03/26/24 1031   Wound Length (cm) 13.8 cm 03/26/24 1031   Wound Width (cm) 5.5 cm 03/26/24 1031   Wound Depth (cm) 0.1 cm 03/26/24 1031   Wound Surface Area (cm^2) 75.9 cm^2 03/26/24 1031   Change in Wound Size % (l*w) 69.64 03/26/24 1031   Wound Volume (cm^3) 7.59 cm^3 03/26/24 1031   Wound Healing % 99 03/26/24 1031   Post-Procedure Length (cm) 13.8 cm 03/26/24 1054   Post-Procedure Width (cm) 5.5 cm 03/26/24 1054   Post-Procedure Depth (cm) 0.1 cm 03/26/24 1054   Post-Procedure Surface Area (cm^2) 75.9 cm^2 03/26/24 1054   Post-Procedure Volume (cm^3) 7.59 cm^3 03/26/24 1054   Distance Tunneling (cm) 0 cm 03/26/24 1031   Tunneling Position ___ O'Clock 0 03/26/24 1031   Undermining Starts ___ O'Clock 0 03/26/24 1031   Undermining Ends___ O'Clock 0 03/26/24 1031   Undermining Maxium Distance (cm) 0 03/26/24 1031   Wound Assessment Pink/red;Hyper granulation tissue 03/26/24 1031   Drainage Amount Large (50-75% saturated) 03/26/24 1031   Drainage Description Sanguinous 03/26/24 1031   Odor None 03/26/24 1031   Meron-wound Assessment Fragile;Intact 03/26/24 1031   Margins Defined edges 03/26/24 1031   Wound Thickness Description not for Pressure Injury Full thickness 03/26/24 1031   Number of days: 82                     Percent of Wound(s) Debrided: approximately 100%    Total  Area  Debrided: 80.5 sq cm     Bleeding:  Minimal    Hemostasis Achieved:  by pressure    Procedural Pain:  1  / 10     Post Procedural Pain:  0 / 10     Response to treatment:  Well tolerated by patient.     Status of wound progress and description from last visit:   slow improvement.        Plan:       Patient Instructions   PHYSICIAN ORDERS AND DISCHARGE INSTRUCTIONS    Wound cleansing:     Do not scrub or use excessive force.   Wash hands with soap and water before and after dressing changes.   Prior to applying a clean dressing, cleanse wound with normal saline,               wound cleanser, or mild soap and water.    Ask the physician or

## 2024-03-26 NOTE — PATIENT INSTRUCTIONS
PHYSICIAN ORDERS AND DISCHARGE INSTRUCTIONS    Wound cleansing:     Do not scrub or use excessive force.   Wash hands with soap and water before and after dressing changes.   Prior to applying a clean dressing, cleanse wound with normal saline,               wound cleanser, or mild soap and water.    Ask the physician or nurse before getting the wound(s) wet in a shower      Wound Care Notes:    Negative Pressure Wound Therapy Leg Lower;Posterior;Right (Active)   Wound Type Surgical 03/26/24 1031   Unit Type CORK 03/26/24 1031   Dressing Type Black Foam 03/26/24 1031   Number of pieces used 1 03/05/24 1134   Number of pieces removed 1 03/26/24 1031   Cycle Continuous 03/05/24 1134   Target Pressure (mmHg) 125 03/05/24 1134   Canister changed? No 03/26/24 1031   Dressing Status New dressing applied;Clean, dry & intact 03/05/24 1134   Dressing Changed Changed/New 03/05/24 1134   Drainage Amount Large 02/20/24 1031   Drainage Description Sanguinous 02/20/24 1031   Wound Assessment Granulation tissue 03/05/24 1134   Meron-wound Assessment Fragile 03/05/24 1134   Shape Irregular 03/05/24 1134   Odor None 03/05/24 1134   Number of days: 84       Wound 12/28/23 #5 left heel (Active)   Wound Image   03/26/24 1031   Wound Etiology Pressure Stage 3 03/26/24 1031   Dressing Status New dressing applied 03/12/24 1057   Wound Cleansed Wound cleanser;Soap and water 03/26/24 1031   Offloading for Diabetic Foot Ulcers Post op shoe 03/26/24 1031   Wound Length (cm) 1.7 cm 03/26/24 1031   Wound Width (cm) 2 cm 03/26/24 1031   Wound Depth (cm) 0.1 cm 03/26/24 1031   Wound Surface Area (cm^2) 3.4 cm^2 03/26/24 1031   Change in Wound Size % (l*w) 54.67 03/26/24 1031   Wound Volume (cm^3) 0.34 cm^3 03/26/24 1031   Post-Procedure Length (cm) 1.7 cm 03/26/24 1054   Post-Procedure Width (cm) 2 cm 03/26/24 1054   Post-Procedure Depth (cm) 0.1 cm 03/26/24 1054   Post-Procedure Surface Area (cm^2) 3.4 cm^2 03/26/24 1054   Post-Procedure Volume

## 2024-03-28 ENCOUNTER — HOSPITAL ENCOUNTER (OUTPATIENT)
Age: 89
Setting detail: SPECIMEN
Discharge: HOME | End: 2024-03-28

## 2024-03-28 LAB
ALBUMIN SERPL-MCNC: 2.9 GM/DL (ref 3.4–5)
ALP BLD-CCNC: 80 IU/L (ref 40–128)
ALT SERPL-CCNC: 7 U/L (ref 10–40)
ANION GAP SERPL CALCULATED.3IONS-SCNC: 12 MMOL/L (ref 7–16)
AST SERPL-CCNC: 12 IU/L (ref 15–37)
BILIRUB SERPL-MCNC: 0.2 MG/DL (ref 0–1)
BUN SERPL-MCNC: 85 MG/DL (ref 6–23)
CALCIUM SERPL-MCNC: 8.7 MG/DL (ref 8.3–10.6)
CHLORIDE BLD-SCNC: 106 MMOL/L (ref 99–110)
CO2: 21 MMOL/L (ref 21–32)
CREAT SERPL-MCNC: 2.4 MG/DL (ref 0.9–1.3)
GFR SERPL CREATININE-BSD FRML MDRD: 24 ML/MIN/1.73M2
GLUCOSE SERPL-MCNC: 75 MG/DL (ref 70–99)
HCT VFR BLD CALC: 21.9 % (ref 42–52)
HEMOGLOBIN: 6.2 GM/DL (ref 13.5–18)
MCH RBC QN AUTO: 24.6 PG (ref 27–31)
MCHC RBC AUTO-ENTMCNC: 28.3 % (ref 32–36)
MCV RBC AUTO: 86.9 FL (ref 78–100)
PDW BLD-RTO: 19.8 % (ref 11.7–14.9)
PLATELET # BLD: 374 K/CU MM (ref 140–440)
PMV BLD AUTO: 10.1 FL (ref 7.5–11.1)
POTASSIUM SERPL-SCNC: 5.1 MMOL/L (ref 3.5–5.1)
RBC # BLD: 2.52 M/CU MM (ref 4.6–6.2)
SODIUM BLD-SCNC: 139 MMOL/L (ref 135–145)
TOTAL PROTEIN: 6 GM/DL (ref 6.4–8.2)
WBC # BLD: 7.7 K/CU MM (ref 4–10.5)

## 2024-03-28 PROCEDURE — 85027 COMPLETE CBC AUTOMATED: CPT

## 2024-03-28 PROCEDURE — 80053 COMPREHEN METABOLIC PANEL: CPT

## 2024-03-28 PROCEDURE — 36415 COLL VENOUS BLD VENIPUNCTURE: CPT

## 2024-03-29 ENCOUNTER — HOSPITAL ENCOUNTER (OUTPATIENT)
Age: 89
Setting detail: SPECIMEN
Discharge: HOME | End: 2024-03-29

## 2024-03-29 DIAGNOSIS — D64.9 ANEMIA, UNSPECIFIED TYPE: Primary | ICD-10-CM

## 2024-03-29 LAB
HCT VFR BLD CALC: 22 % (ref 42–52)
HEMOGLOBIN: 6.3 GM/DL (ref 13.5–18)

## 2024-03-29 PROCEDURE — 85014 HEMATOCRIT: CPT

## 2024-03-29 PROCEDURE — 36415 COLL VENOUS BLD VENIPUNCTURE: CPT

## 2024-03-29 PROCEDURE — 85018 HEMOGLOBIN: CPT

## 2024-03-29 RX ORDER — ALBUTEROL SULFATE 90 UG/1
4 AEROSOL, METERED RESPIRATORY (INHALATION) PRN
Status: CANCELLED | OUTPATIENT
Start: 2024-04-01

## 2024-03-29 RX ORDER — SODIUM CHLORIDE 0.9 % (FLUSH) 0.9 %
5-40 SYRINGE (ML) INJECTION PRN
Status: CANCELLED | OUTPATIENT
Start: 2024-04-01

## 2024-03-29 RX ORDER — SODIUM CHLORIDE 9 MG/ML
INJECTION, SOLUTION INTRAVENOUS CONTINUOUS
Status: CANCELLED | OUTPATIENT
Start: 2024-04-01

## 2024-03-29 RX ORDER — FAMOTIDINE 10 MG/ML
20 INJECTION, SOLUTION INTRAVENOUS
Status: CANCELLED | OUTPATIENT
Start: 2024-04-01

## 2024-03-29 RX ORDER — ACETAMINOPHEN 325 MG/1
650 TABLET ORAL
Status: CANCELLED | OUTPATIENT
Start: 2024-04-01

## 2024-03-29 RX ORDER — DIPHENHYDRAMINE HYDROCHLORIDE 50 MG/ML
50 INJECTION INTRAMUSCULAR; INTRAVENOUS
Status: CANCELLED | OUTPATIENT
Start: 2024-04-01

## 2024-03-29 RX ORDER — SODIUM CHLORIDE 9 MG/ML
20 INJECTION, SOLUTION INTRAVENOUS CONTINUOUS
Status: CANCELLED | OUTPATIENT
Start: 2024-04-01

## 2024-03-29 RX ORDER — ONDANSETRON 2 MG/ML
8 INJECTION INTRAMUSCULAR; INTRAVENOUS
Status: CANCELLED | OUTPATIENT
Start: 2024-04-01

## 2024-03-29 RX ORDER — EPINEPHRINE 1 MG/ML
0.3 INJECTION, SOLUTION, CONCENTRATE INTRAVENOUS PRN
Status: CANCELLED | OUTPATIENT
Start: 2024-04-01

## 2024-03-29 RX ORDER — SODIUM CHLORIDE 9 MG/ML
25 INJECTION, SOLUTION INTRAVENOUS PRN
Status: CANCELLED | OUTPATIENT
Start: 2024-04-01

## 2024-03-30 ENCOUNTER — HOSPITAL ENCOUNTER (OUTPATIENT)
Age: 89
Setting detail: SPECIMEN
Discharge: HOME | End: 2024-03-30

## 2024-03-30 LAB
HCT VFR BLD CALC: 23.3 % (ref 42–52)
HEMOGLOBIN: 6.4 GM/DL (ref 13.5–18)

## 2024-03-30 PROCEDURE — 85014 HEMATOCRIT: CPT

## 2024-03-30 PROCEDURE — 85018 HEMOGLOBIN: CPT

## 2024-03-30 PROCEDURE — 36415 COLL VENOUS BLD VENIPUNCTURE: CPT

## 2024-03-31 ENCOUNTER — HOSPITAL ENCOUNTER (OUTPATIENT)
Age: 89
Setting detail: SPECIMEN
Discharge: HOME | End: 2024-03-31

## 2024-03-31 LAB
HCT VFR BLD CALC: 25.4 % (ref 42–52)
HEMOGLOBIN: 6.8 GM/DL (ref 13.5–18)

## 2024-03-31 PROCEDURE — 85014 HEMATOCRIT: CPT

## 2024-03-31 PROCEDURE — 86922 COMPATIBILITY TEST ANTIGLOB: CPT

## 2024-03-31 PROCEDURE — 36415 COLL VENOUS BLD VENIPUNCTURE: CPT

## 2024-03-31 PROCEDURE — 86850 RBC ANTIBODY SCREEN: CPT

## 2024-03-31 PROCEDURE — 86901 BLOOD TYPING SEROLOGIC RH(D): CPT

## 2024-03-31 PROCEDURE — 86900 BLOOD TYPING SEROLOGIC ABO: CPT

## 2024-03-31 PROCEDURE — 85018 HEMOGLOBIN: CPT

## 2024-04-01 ENCOUNTER — HOSPITAL ENCOUNTER (OUTPATIENT)
Dept: INFUSION THERAPY | Age: 89
Setting detail: INFUSION SERIES
Discharge: HOME | End: 2024-04-01
Payer: MEDICARE

## 2024-04-01 ENCOUNTER — HOSPITAL ENCOUNTER (OUTPATIENT)
Age: 89
Setting detail: SPECIMEN
Discharge: HOME | End: 2024-04-01

## 2024-04-01 VITALS
TEMPERATURE: 97.8 F | SYSTOLIC BLOOD PRESSURE: 145 MMHG | RESPIRATION RATE: 16 BRPM | DIASTOLIC BLOOD PRESSURE: 78 MMHG | OXYGEN SATURATION: 100 % | HEART RATE: 58 BPM

## 2024-04-01 DIAGNOSIS — D64.9 ANEMIA, UNSPECIFIED TYPE: Primary | ICD-10-CM

## 2024-04-01 LAB
HCT VFR BLD CALC: 24.2 % (ref 42–52)
HEMOGLOBIN: 6.9 GM/DL (ref 13.5–18)
MCH RBC QN AUTO: 24.6 PG (ref 27–31)
MCHC RBC AUTO-ENTMCNC: 28.5 % (ref 32–36)
MCV RBC AUTO: 86.1 FL (ref 78–100)
PDW BLD-RTO: 19.9 % (ref 11.7–14.9)
PLATELET # BLD: 351 K/CU MM (ref 140–440)
PMV BLD AUTO: 10.3 FL (ref 7.5–11.1)
RBC # BLD: 2.81 M/CU MM (ref 4.6–6.2)
WBC # BLD: 7.2 K/CU MM (ref 4–10.5)

## 2024-04-01 PROCEDURE — 36415 COLL VENOUS BLD VENIPUNCTURE: CPT

## 2024-04-01 PROCEDURE — 36430 TRANSFUSION BLD/BLD COMPNT: CPT

## 2024-04-01 PROCEDURE — 2580000003 HC RX 258: Performed by: FAMILY MEDICINE

## 2024-04-01 PROCEDURE — P9016 RBC LEUKOCYTES REDUCED: HCPCS

## 2024-04-01 PROCEDURE — 85027 COMPLETE CBC AUTOMATED: CPT

## 2024-04-01 RX ORDER — SODIUM CHLORIDE 9 MG/ML
25 INJECTION, SOLUTION INTRAVENOUS PRN
Status: CANCELLED | OUTPATIENT
Start: 2024-04-01

## 2024-04-01 RX ORDER — FAMOTIDINE 10 MG/ML
20 INJECTION, SOLUTION INTRAVENOUS
Status: CANCELLED | OUTPATIENT
Start: 2024-04-01

## 2024-04-01 RX ORDER — SODIUM CHLORIDE 9 MG/ML
20 INJECTION, SOLUTION INTRAVENOUS CONTINUOUS
Status: CANCELLED | OUTPATIENT
Start: 2024-04-01

## 2024-04-01 RX ORDER — EPINEPHRINE 1 MG/ML
0.3 INJECTION, SOLUTION, CONCENTRATE INTRAVENOUS PRN
Status: CANCELLED | OUTPATIENT
Start: 2024-04-01

## 2024-04-01 RX ORDER — ACETAMINOPHEN 325 MG/1
650 TABLET ORAL
Status: CANCELLED | OUTPATIENT
Start: 2024-04-01

## 2024-04-01 RX ORDER — SODIUM CHLORIDE 0.9 % (FLUSH) 0.9 %
5-40 SYRINGE (ML) INJECTION PRN
Status: CANCELLED | OUTPATIENT
Start: 2024-04-01

## 2024-04-01 RX ORDER — ALBUTEROL SULFATE 90 UG/1
4 AEROSOL, METERED RESPIRATORY (INHALATION) PRN
Status: CANCELLED | OUTPATIENT
Start: 2024-04-01

## 2024-04-01 RX ORDER — ONDANSETRON 2 MG/ML
8 INJECTION INTRAMUSCULAR; INTRAVENOUS
Status: CANCELLED | OUTPATIENT
Start: 2024-04-01

## 2024-04-01 RX ORDER — SODIUM CHLORIDE 0.9 % (FLUSH) 0.9 %
5-40 SYRINGE (ML) INJECTION PRN
Status: DISCONTINUED | OUTPATIENT
Start: 2024-04-01 | End: 2024-04-01

## 2024-04-01 RX ORDER — SODIUM CHLORIDE 9 MG/ML
INJECTION, SOLUTION INTRAVENOUS CONTINUOUS
Status: CANCELLED | OUTPATIENT
Start: 2024-04-01

## 2024-04-01 RX ORDER — SODIUM CHLORIDE 9 MG/ML
20 INJECTION, SOLUTION INTRAVENOUS CONTINUOUS
Status: DISCONTINUED | OUTPATIENT
Start: 2024-04-01 | End: 2024-04-01

## 2024-04-01 RX ORDER — DIPHENHYDRAMINE HYDROCHLORIDE 50 MG/ML
50 INJECTION INTRAMUSCULAR; INTRAVENOUS
Status: CANCELLED | OUTPATIENT
Start: 2024-04-01

## 2024-04-01 RX ADMIN — SODIUM CHLORIDE, PRESERVATIVE FREE 10 ML: 5 INJECTION INTRAVENOUS at 08:34

## 2024-04-01 RX ADMIN — SODIUM CHLORIDE, PRESERVATIVE FREE 10 ML: 5 INJECTION INTRAVENOUS at 11:00

## 2024-04-01 NOTE — PROGRESS NOTES
Tolerated TRANSFUSION well. Reviewed discharge instruction, voiced understanding. Copies of AVS given. Pt discharged TO [facility]. Pt to exit via WHEELCHAIR.    Orders Placed This Encounter   Medications    0.9 % sodium chloride infusion    sodium chloride flush 0.9 % injection 5-40 mL

## 2024-04-02 ENCOUNTER — HOSPITAL ENCOUNTER (OUTPATIENT)
Dept: WOUND CARE | Age: 89
Discharge: HOME | End: 2024-04-02
Attending: SURGERY
Payer: MEDICARE

## 2024-04-02 VITALS — HEART RATE: 61 BPM | SYSTOLIC BLOOD PRESSURE: 132 MMHG | RESPIRATION RATE: 16 BRPM | DIASTOLIC BLOOD PRESSURE: 62 MMHG

## 2024-04-02 DIAGNOSIS — L02.419 CELLULITIS AND ABSCESS OF LEG: ICD-10-CM

## 2024-04-02 DIAGNOSIS — L97.312 ANKLE ULCER, RIGHT, WITH FAT LAYER EXPOSED (HCC): Primary | ICD-10-CM

## 2024-04-02 DIAGNOSIS — L03.119 CELLULITIS AND ABSCESS OF LEG: ICD-10-CM

## 2024-04-02 LAB
ABO/RH: NORMAL
ANTIBODY SCREEN: NEGATIVE
COMPONENT: NORMAL
CROSSMATCH RESULT: NORMAL
STATUS: NORMAL
TRANSFUSION STATUS: NORMAL
UNIT DIVISION: 0
UNIT NUMBER: NORMAL

## 2024-04-02 PROCEDURE — 11046 DBRDMT MUSC&/FSCA EA ADDL: CPT

## 2024-04-02 PROCEDURE — 6370000000 HC RX 637 (ALT 250 FOR IP): Performed by: SURGERY

## 2024-04-02 PROCEDURE — 11042 DBRDMT SUBQ TIS 1ST 20SQCM/<: CPT

## 2024-04-02 PROCEDURE — 97606 NEG PRS WND THER DME>50 SQCM: CPT

## 2024-04-02 PROCEDURE — 11043 DBRDMT MUSC&/FSCA 1ST 20/<: CPT

## 2024-04-02 RX ORDER — LIDOCAINE HYDROCHLORIDE 20 MG/ML
JELLY TOPICAL ONCE
OUTPATIENT
Start: 2024-04-02 | End: 2024-04-02

## 2024-04-02 RX ORDER — BACITRACIN ZINC AND POLYMYXIN B SULFATE 500; 1000 [USP'U]/G; [USP'U]/G
OINTMENT TOPICAL ONCE
OUTPATIENT
Start: 2024-04-02 | End: 2024-04-02

## 2024-04-02 RX ORDER — CLOBETASOL PROPIONATE 0.5 MG/G
OINTMENT TOPICAL ONCE
OUTPATIENT
Start: 2024-04-02 | End: 2024-04-02

## 2024-04-02 RX ORDER — GENTAMICIN SULFATE 1 MG/G
OINTMENT TOPICAL ONCE
Status: DISCONTINUED | OUTPATIENT
Start: 2024-04-02 | End: 2024-04-03 | Stop reason: HOSPADM

## 2024-04-02 RX ORDER — BACITRACIN ZINC 500 [USP'U]/G
OINTMENT TOPICAL ONCE
OUTPATIENT
Start: 2024-04-02 | End: 2024-04-02

## 2024-04-02 RX ORDER — LIDOCAINE HYDROCHLORIDE 40 MG/ML
SOLUTION TOPICAL ONCE
OUTPATIENT
Start: 2024-04-02 | End: 2024-04-02

## 2024-04-02 RX ORDER — TRIAMCINOLONE ACETONIDE 1 MG/G
OINTMENT TOPICAL ONCE
OUTPATIENT
Start: 2024-04-02 | End: 2024-04-02

## 2024-04-02 RX ORDER — SODIUM CHLOR/HYPOCHLOROUS ACID 0.033 %
SOLUTION, IRRIGATION IRRIGATION ONCE
OUTPATIENT
Start: 2024-04-02 | End: 2024-04-02

## 2024-04-02 RX ORDER — IBUPROFEN 200 MG
TABLET ORAL ONCE
OUTPATIENT
Start: 2024-04-02 | End: 2024-04-02

## 2024-04-02 RX ORDER — BETAMETHASONE DIPROPIONATE 0.5 MG/G
CREAM TOPICAL ONCE
OUTPATIENT
Start: 2024-04-02 | End: 2024-04-02

## 2024-04-02 RX ORDER — LIDOCAINE 40 MG/G
CREAM TOPICAL ONCE
OUTPATIENT
Start: 2024-04-02 | End: 2024-04-02

## 2024-04-02 RX ORDER — LIDOCAINE 50 MG/G
OINTMENT TOPICAL ONCE
OUTPATIENT
Start: 2024-04-02 | End: 2024-04-02

## 2024-04-02 RX ORDER — GENTAMICIN SULFATE 1 MG/G
OINTMENT TOPICAL ONCE
OUTPATIENT
Start: 2024-04-02 | End: 2024-04-02

## 2024-04-02 NOTE — PROGRESS NOTES
Medications on File Prior to Encounter   Medication Sig Dispense Refill    apixaban (ELIQUIS) 5 MG TABS tablet Take 0.5 tablets by mouth 2 times daily 30 tablet 1    Sodium Phosphates (PHOSPHATE ENEMA RE) Place 1 Application rectally daily as needed (constipation)      magnesium hydroxide (MILK OF MAGNESIA) 400 MG/5ML suspension Take 30 mLs by mouth daily as needed for Constipation      traZODone (DESYREL) 50 MG tablet Take 1 tablet by mouth nightly      Misc. Devices (WALKER WHEELS) MISC 1 each by Does not apply route once for 1 dose 1 each 0    methocarbamol (ROBAXIN) 500 MG tablet Take 1 tablet by mouth 3 times daily      metoprolol succinate (TOPROL XL) 25 MG extended release tablet Take 0.5 tablets by mouth daily      rOPINIRole (REQUIP) 0.25 MG tablet Take 3 tablets by mouth nightly      bisacodyl (DULCOLAX) 5 MG EC tablet Take 1 tablet by mouth daily as needed for Constipation      acetaminophen (TYLENOL) 325 MG tablet Take 2 tablets by mouth every 6 hours as needed for Pain      docusate sodium (COLACE) 100 MG capsule Take 1 capsule by mouth 2 times daily as needed for Constipation      Multiple Vitamins-Minerals (MULTIVITAMIN ADULT PO) Take 1 tablet by mouth daily      ezetimibe (ZETIA) 10 MG tablet Take 1 tablet by mouth daily      furosemide (LASIX) 40 MG tablet Take 1 tablet by mouth daily      nitroGLYCERIN (NITROSTAT) 0.4 MG SL tablet Place 1 tablet under the tongue every 5 minutes as needed for Chest pain up to max of 3 total doses. If no relief after 1 dose, call 911.      aspirin 81 MG tablet Take 1 tablet by mouth daily       No current facility-administered medications on file prior to encounter.       REVIEW OF SYSTEMS    Pertinent items are noted in HPI.    Constitutional: Negative for systemic symptoms including fever, chills and malaise.      Objective:      /62   Pulse 61   Resp 16     PHYSICAL EXAM  General: No acute distress  Respiratory: Chest rise equal bilaterally  CV: Appears

## 2024-04-04 ENCOUNTER — HOSPITAL ENCOUNTER (OUTPATIENT)
Age: 89
Setting detail: SPECIMEN
Discharge: HOME | End: 2024-04-04

## 2024-04-04 LAB
ALBUMIN SERPL-MCNC: 2.9 GM/DL (ref 3.4–5)
ALP BLD-CCNC: 74 IU/L (ref 40–129)
ALT SERPL-CCNC: 6 U/L (ref 10–40)
ANION GAP SERPL CALCULATED.3IONS-SCNC: 11 MMOL/L (ref 7–16)
AST SERPL-CCNC: 11 IU/L (ref 15–37)
BILIRUB SERPL-MCNC: 0.3 MG/DL (ref 0–1)
BUN SERPL-MCNC: 103 MG/DL (ref 6–23)
CALCIUM SERPL-MCNC: 8.6 MG/DL (ref 8.3–10.6)
CHLORIDE BLD-SCNC: 109 MMOL/L (ref 99–110)
CO2: 19 MMOL/L (ref 21–32)
CREAT SERPL-MCNC: 2.8 MG/DL (ref 0.9–1.3)
GFR SERPL CREATININE-BSD FRML MDRD: 20 ML/MIN/1.73M2
GLUCOSE SERPL-MCNC: 86 MG/DL (ref 70–99)
HCT VFR BLD CALC: 24.9 % (ref 42–52)
HEMOGLOBIN: 7.1 GM/DL (ref 13.5–18)
MCH RBC QN AUTO: 24.5 PG (ref 27–31)
MCHC RBC AUTO-ENTMCNC: 28.5 % (ref 32–36)
MCV RBC AUTO: 85.9 FL (ref 78–100)
PDW BLD-RTO: 19.5 % (ref 11.7–14.9)
PLATELET # BLD: 267 K/CU MM (ref 140–440)
PMV BLD AUTO: 10.1 FL (ref 7.5–11.1)
POTASSIUM SERPL-SCNC: 4.6 MMOL/L (ref 3.5–5.1)
RBC # BLD: 2.9 M/CU MM (ref 4.6–6.2)
SODIUM BLD-SCNC: 139 MMOL/L (ref 135–145)
TOTAL PROTEIN: 6.1 GM/DL (ref 6.4–8.2)
WBC # BLD: 7.8 K/CU MM (ref 4–10.5)

## 2024-04-04 PROCEDURE — 36415 COLL VENOUS BLD VENIPUNCTURE: CPT

## 2024-04-04 PROCEDURE — 80053 COMPREHEN METABOLIC PANEL: CPT

## 2024-04-04 PROCEDURE — 85027 COMPLETE CBC AUTOMATED: CPT

## 2024-04-09 ENCOUNTER — HOSPITAL ENCOUNTER (OUTPATIENT)
Dept: WOUND CARE | Age: 89
Discharge: HOME | End: 2024-04-09
Attending: SURGERY
Payer: MEDICARE

## 2024-04-09 VITALS
TEMPERATURE: 96.8 F | DIASTOLIC BLOOD PRESSURE: 55 MMHG | RESPIRATION RATE: 16 BRPM | SYSTOLIC BLOOD PRESSURE: 111 MMHG | HEART RATE: 60 BPM

## 2024-04-09 DIAGNOSIS — L02.419 CELLULITIS AND ABSCESS OF LEG: ICD-10-CM

## 2024-04-09 DIAGNOSIS — L97.312 ANKLE ULCER, RIGHT, WITH FAT LAYER EXPOSED (HCC): Primary | ICD-10-CM

## 2024-04-09 DIAGNOSIS — L03.119 CELLULITIS AND ABSCESS OF LEG: ICD-10-CM

## 2024-04-09 PROCEDURE — 11043 DBRDMT MUSC&/FSCA 1ST 20/<: CPT

## 2024-04-09 PROCEDURE — 97606 NEG PRS WND THER DME>50 SQCM: CPT

## 2024-04-09 PROCEDURE — 11042 DBRDMT SUBQ TIS 1ST 20SQCM/<: CPT

## 2024-04-09 RX ORDER — LIDOCAINE 50 MG/G
OINTMENT TOPICAL ONCE
OUTPATIENT
Start: 2024-04-09 | End: 2024-04-09

## 2024-04-09 RX ORDER — LIDOCAINE HYDROCHLORIDE 20 MG/ML
JELLY TOPICAL ONCE
OUTPATIENT
Start: 2024-04-09 | End: 2024-04-09

## 2024-04-09 RX ORDER — BETAMETHASONE DIPROPIONATE 0.5 MG/G
CREAM TOPICAL ONCE
OUTPATIENT
Start: 2024-04-09 | End: 2024-04-09

## 2024-04-09 RX ORDER — LIDOCAINE 40 MG/G
CREAM TOPICAL ONCE
OUTPATIENT
Start: 2024-04-09 | End: 2024-04-09

## 2024-04-09 RX ORDER — BACITRACIN ZINC 500 [USP'U]/G
OINTMENT TOPICAL ONCE
OUTPATIENT
Start: 2024-04-09 | End: 2024-04-09

## 2024-04-09 RX ORDER — LIDOCAINE HYDROCHLORIDE 40 MG/ML
SOLUTION TOPICAL ONCE
OUTPATIENT
Start: 2024-04-09 | End: 2024-04-09

## 2024-04-09 RX ORDER — BACITRACIN ZINC AND POLYMYXIN B SULFATE 500; 1000 [USP'U]/G; [USP'U]/G
OINTMENT TOPICAL ONCE
OUTPATIENT
Start: 2024-04-09 | End: 2024-04-09

## 2024-04-09 RX ORDER — IBUPROFEN 200 MG
TABLET ORAL ONCE
OUTPATIENT
Start: 2024-04-09 | End: 2024-04-09

## 2024-04-09 RX ORDER — CLOBETASOL PROPIONATE 0.5 MG/G
OINTMENT TOPICAL ONCE
OUTPATIENT
Start: 2024-04-09 | End: 2024-04-09

## 2024-04-09 RX ORDER — SODIUM CHLOR/HYPOCHLOROUS ACID 0.033 %
SOLUTION, IRRIGATION IRRIGATION ONCE
OUTPATIENT
Start: 2024-04-09 | End: 2024-04-09

## 2024-04-09 RX ORDER — TRIAMCINOLONE ACETONIDE 1 MG/G
OINTMENT TOPICAL ONCE
OUTPATIENT
Start: 2024-04-09 | End: 2024-04-09

## 2024-04-09 RX ORDER — GENTAMICIN SULFATE 1 MG/G
OINTMENT TOPICAL ONCE
OUTPATIENT
Start: 2024-04-09 | End: 2024-04-09

## 2024-04-09 ASSESSMENT — PAIN DESCRIPTION - DESCRIPTORS: DESCRIPTORS: TENDER;SORE

## 2024-04-09 ASSESSMENT — PAIN DESCRIPTION - FREQUENCY: FREQUENCY: INTERMITTENT

## 2024-04-09 ASSESSMENT — PAIN DESCRIPTION - LOCATION: LOCATION: LEG

## 2024-04-09 ASSESSMENT — PAIN - FUNCTIONAL ASSESSMENT: PAIN_FUNCTIONAL_ASSESSMENT: PREVENTS OR INTERFERES SOME ACTIVE ACTIVITIES AND ADLS

## 2024-04-09 ASSESSMENT — PAIN DESCRIPTION - PAIN TYPE: TYPE: CHRONIC PAIN

## 2024-04-09 ASSESSMENT — PAIN SCALES - GENERAL: PAINLEVEL_OUTOF10: 7

## 2024-04-09 ASSESSMENT — PAIN DESCRIPTION - ORIENTATION: ORIENTATION: RIGHT;LEFT

## 2024-04-09 ASSESSMENT — PAIN DESCRIPTION - ONSET: ONSET: ON-GOING

## 2024-04-09 NOTE — PATIENT INSTRUCTIONS
PHYSICIAN ORDERS AND DISCHARGE INSTRUCTIONS    Wound cleansing:     Do not scrub or use excessive force.   Wash hands with soap and water before and after dressing changes.   Prior to applying a clean dressing, cleanse wound with normal saline,               wound cleanser, or mild soap and water.    Ask the physician or nurse before getting the wound(s) wet in a shower      Wound Care Notes:         Orders for this week:  4/9/2024    FAX ORDERS TO [place]! Send Patient with wound vac supplies to each appointment to wound care center    -Need to send an aide with client during [facility] visits due to confusion.      Right Lateral ankle and Left Heel - Wash with soap and water, pat dry.    Apply double optifoam gentle silicone foam to periwound of right lateral ankle secured by steristrips   Apply Santyl and Gentamicin to wound bed.  Cover with ca alginate cut to wound size   Cover with optifoam optifoam Heel cup.   Leave in place for 1 week.    Right Posterior Lower Leg Wound - WOUND VAC THERAPY: Wash with soap and water, pat dry.   SKIN PREP OR MASTISOL AND DUODERM TO PERIWOUND FOR PROTECTION.   Apply stimulen powder, sorbact, AND BLACK FOAM TO WOUND.   SECURE VAC DRESSING WITH DRAPE.  SET WOUND VAC  CONTINUOUS SUCTION.   CANISTER CHANGE WEEKLY OR ACCORDING TO VOLUME OF DRAINAGE.  WOUND VAC DRESSING TO BE CHANGED BY FACILITY THURSDAYS AND SATURDAYS  WOUND CARE CENTER WILL CHANGE ON TUESDAYS (Need to send vac supplies - black foam and canister to appointments)    Place ca alginate between toes and wrap bilateral legs: Profore (Fourflex) to right leg (Coflex full to left leg).   Left leg wrap can stay on for 1 week, Right will need to be changed with vac dressing changes.   DO NOT COMPRESS VAC TUBING OR SUCTION PIECE AGAINST SKIN - CUT A HOLE/WRAP AROUND!    Dispense 30 day quantity when ordering supplies.  Plan: Applied for skin grafts 3/12/24      []   Nurse Visit:   Follow Up Instructions: At the Wound Care

## 2024-04-09 NOTE — PROGRESS NOTES
Multilayer Compression Wrap   (Not Unna) Below the Knee    NAME:  Westley Rios  YOB: 1926  MEDICAL RECORD NUMBER:  [id number]  DATE:  4/9/2024    Multilayer compression wrap: Removed old Multilayer wrap if indicated and wash leg with mild soap/water.  Applied moisturizing agent to dry skin as needed.   Applied primary and secondary dressing as ordered.  Applied multilayered dressing below the knee to right lower leg.  Applied multilayered dressing below the knee to left lower leg.  Instructed patient/caregiver not to remove dressing and to keep it clean and dry.   Instructed patient/caregiver on complications to report to provider, such as pain, numbness in toes, heavy drainage, and slippage of dressing.  Instructed patient on purpose of compression dressing and on activity and exercise recommendations.    Nonselective enzymatic debridement performed with Santyl per physician order to wound(s) of the BILATERAL   Patient tolerated the procedure well.     Electronically signed by Marcelino Norman RN on 4/9/2024 at 12:20 PM

## 2024-04-10 ENCOUNTER — HOSPITAL ENCOUNTER (OUTPATIENT)
Age: 89
Setting detail: SPECIMEN
Discharge: HOME | End: 2024-04-10

## 2024-04-10 PROCEDURE — 87086 URINE CULTURE/COLONY COUNT: CPT

## 2024-04-10 PROCEDURE — 81001 URINALYSIS AUTO W/SCOPE: CPT

## 2024-04-10 PROCEDURE — 87077 CULTURE AEROBIC IDENTIFY: CPT

## 2024-04-11 ENCOUNTER — HOSPITAL ENCOUNTER (OUTPATIENT)
Age: 89
Setting detail: SPECIMEN
Discharge: HOME | End: 2024-04-11

## 2024-04-11 LAB
BILIRUBIN URINE: NEGATIVE MG/DL
BLOOD, URINE: ABNORMAL
CLARITY: ABNORMAL
COLOR: YELLOW
GLUCOSE, URINE: NEGATIVE MG/DL
HCT VFR BLD CALC: 25.1 % (ref 42–52)
HEMOGLOBIN: 7.4 GM/DL (ref 13.5–18)
KETONES, URINE: NEGATIVE MG/DL
LEUKOCYTE ESTERASE, URINE: ABNORMAL
MCH RBC QN AUTO: 24.6 PG (ref 27–31)
MCHC RBC AUTO-ENTMCNC: 29.5 % (ref 32–36)
MCV RBC AUTO: 83.4 FL (ref 78–100)
NITRITE URINE, QUANTITATIVE: NEGATIVE
PDW BLD-RTO: 19.3 % (ref 11.7–14.9)
PH, URINE: 6 (ref 5–8)
PLATELET # BLD: 286 K/CU MM (ref 140–440)
PMV BLD AUTO: 10.2 FL (ref 7.5–11.1)
PROTEIN UA: 30 MG/DL
RBC # BLD: 3.01 M/CU MM (ref 4.6–6.2)
RBC URINE: 20 /HPF (ref 0–3)
SPECIFIC GRAVITY UA: 1.01 (ref 1–1.03)
UROBILINOGEN, URINE: 0.2 MG/DL (ref 0.2–1)
WBC # BLD: 5.6 K/CU MM (ref 4–10.5)
WBC UA: 2994 /HPF (ref 0–2)

## 2024-04-11 PROCEDURE — 85027 COMPLETE CBC AUTOMATED: CPT

## 2024-04-11 PROCEDURE — 80053 COMPREHEN METABOLIC PANEL: CPT

## 2024-04-11 PROCEDURE — 36415 COLL VENOUS BLD VENIPUNCTURE: CPT

## 2024-04-12 ENCOUNTER — HOSPITAL ENCOUNTER (OUTPATIENT)
Age: 89
Setting detail: SPECIMEN
Discharge: HOME | End: 2024-04-12
Payer: MEDICARE

## 2024-04-12 LAB
ALBUMIN SERPL-MCNC: 2.8 GM/DL (ref 3.4–5)
ALBUMIN SERPL-MCNC: 2.9 GM/DL (ref 3.4–5)
ALP BLD-CCNC: 90 IU/L (ref 40–129)
ALT SERPL-CCNC: 7 U/L (ref 10–40)
ANION GAP SERPL CALCULATED.3IONS-SCNC: 15 MMOL/L (ref 7–16)
ANION GAP SERPL CALCULATED.3IONS-SCNC: 16 MMOL/L (ref 7–16)
AST SERPL-CCNC: 10 IU/L (ref 15–37)
BASOPHILS ABSOLUTE: 0.1 K/CU MM
BASOPHILS RELATIVE PERCENT: 0.7 % (ref 0–1)
BILIRUB SERPL-MCNC: 0.3 MG/DL (ref 0–1)
BUN SERPL-MCNC: 105 MG/DL (ref 6–23)
BUN SERPL-MCNC: 110 MG/DL (ref 6–23)
CALCIUM SERPL-MCNC: 8.4 MG/DL (ref 8.3–10.6)
CALCIUM SERPL-MCNC: 8.6 MG/DL (ref 8.3–10.6)
CHLORIDE BLD-SCNC: 106 MMOL/L (ref 99–110)
CHLORIDE BLD-SCNC: 106 MMOL/L (ref 99–110)
CO2: 17 MMOL/L (ref 21–32)
CO2: 19 MMOL/L (ref 21–32)
CREAT SERPL-MCNC: 3.9 MG/DL (ref 0.9–1.3)
CREAT SERPL-MCNC: 4.3 MG/DL (ref 0.9–1.3)
CULTURE: ABNORMAL
CULTURE: ABNORMAL
DIFFERENTIAL TYPE: ABNORMAL
EOSINOPHILS ABSOLUTE: 0.2 K/CU MM
EOSINOPHILS RELATIVE PERCENT: 3.1 % (ref 0–3)
FERRITIN: 201 NG/ML (ref 30–400)
GFR SERPL CREATININE-BSD FRML MDRD: 12 ML/MIN/1.73M2
GFR SERPL CREATININE-BSD FRML MDRD: 13 ML/MIN/1.73M2
GLUCOSE SERPL-MCNC: 104 MG/DL (ref 70–99)
GLUCOSE SERPL-MCNC: 93 MG/DL (ref 70–99)
HCT VFR BLD CALC: 26.9 % (ref 42–52)
HEMOGLOBIN: 7.8 GM/DL (ref 13.5–18)
IMMATURE NEUTROPHIL %: 2.9 % (ref 0–0.43)
IRON: 15 UG/DL (ref 59–158)
LYMPHOCYTES ABSOLUTE: 1.3 K/CU MM
LYMPHOCYTES RELATIVE PERCENT: 18.4 % (ref 24–44)
Lab: ABNORMAL
MAGNESIUM: 2.1 MG/DL (ref 1.8–2.4)
MCH RBC QN AUTO: 24.1 PG (ref 27–31)
MCHC RBC AUTO-ENTMCNC: 29 % (ref 32–36)
MCV RBC AUTO: 83.3 FL (ref 78–100)
MONOCYTES ABSOLUTE: 0.9 K/CU MM
MONOCYTES RELATIVE PERCENT: 13.4 % (ref 0–4)
NEUTROPHILS RELATIVE PERCENT: 61.5 % (ref 36–66)
NUCLEATED RBC %: 0 %
PCT TRANSFERRIN: 6 % (ref 10–44)
PDW BLD-RTO: 19.3 % (ref 11.7–14.9)
PHOSPHORUS: 5.1 MG/DL (ref 2.5–4.9)
PLATELET # BLD: 349 K/CU MM (ref 140–440)
PMV BLD AUTO: 10.7 FL (ref 7.5–11.1)
POTASSIUM SERPL-SCNC: 5.1 MMOL/L (ref 3.5–5.1)
POTASSIUM SERPL-SCNC: 5.8 MMOL/L (ref 3.5–5.1)
RBC # BLD: 3.23 M/CU MM (ref 4.6–6.2)
SEGMENTED NEUTROPHILS ABSOLUTE COUNT: 4.3 K/CU MM
SODIUM BLD-SCNC: 139 MMOL/L (ref 135–145)
SODIUM BLD-SCNC: 140 MMOL/L (ref 135–145)
SPECIMEN: ABNORMAL
TOTAL IMMATURE NEUTOROPHIL: 0.2 K/CU MM
TOTAL IRON BINDING CAPACITY: 239 UG/DL (ref 250–450)
TOTAL NUCLEATED RBC: 0 K/CU MM
TOTAL PROTEIN: 6.1 GM/DL (ref 6.4–8.2)
UNSATURATED IRON BINDING CAPACITY: 224 UG/DL (ref 110–370)
WBC # BLD: 7 K/CU MM (ref 4–10.5)

## 2024-04-12 PROCEDURE — 83540 ASSAY OF IRON: CPT

## 2024-04-12 PROCEDURE — 82728 ASSAY OF FERRITIN: CPT

## 2024-04-12 PROCEDURE — 85025 COMPLETE CBC W/AUTO DIFF WBC: CPT

## 2024-04-12 PROCEDURE — 36415 COLL VENOUS BLD VENIPUNCTURE: CPT

## 2024-04-12 PROCEDURE — 82040 ASSAY OF SERUM ALBUMIN: CPT

## 2024-04-12 PROCEDURE — 83735 ASSAY OF MAGNESIUM: CPT

## 2024-04-12 PROCEDURE — 83550 IRON BINDING TEST: CPT

## 2024-04-12 PROCEDURE — 80048 BASIC METABOLIC PNL TOTAL CA: CPT

## 2024-04-12 PROCEDURE — 83970 ASSAY OF PARATHORMONE: CPT

## 2024-04-12 PROCEDURE — 84100 ASSAY OF PHOSPHORUS: CPT

## 2024-04-15 LAB
CULTURE: ABNORMAL
CULTURE: ABNORMAL
Lab: ABNORMAL
SPECIMEN: ABNORMAL

## 2024-04-16 ENCOUNTER — HOSPITAL ENCOUNTER (OUTPATIENT)
Age: 89
Setting detail: SPECIMEN
Discharge: HOME | End: 2024-04-16

## 2024-04-16 LAB
ANION GAP SERPL CALCULATED.3IONS-SCNC: 17 MMOL/L (ref 7–16)
BUN SERPL-MCNC: 106 MG/DL (ref 6–23)
CALCIUM SERPL-MCNC: 8.6 MG/DL (ref 8.3–10.6)
CHLORIDE BLD-SCNC: 103 MMOL/L (ref 99–110)
CO2: 18 MMOL/L (ref 21–32)
CREAT SERPL-MCNC: 4.7 MG/DL (ref 0.9–1.3)
GFR SERPL CREATININE-BSD FRML MDRD: 11 ML/MIN/1.73M2
GLUCOSE SERPL-MCNC: 99 MG/DL (ref 70–99)
POTASSIUM SERPL-SCNC: 6.2 MMOL/L (ref 3.5–5.1)
PTH-INTACT SERPL-MCNC: 37 PG/ML (ref 15–65)
SODIUM BLD-SCNC: 138 MMOL/L (ref 135–145)

## 2024-04-16 PROCEDURE — 36415 COLL VENOUS BLD VENIPUNCTURE: CPT

## 2024-04-16 PROCEDURE — 80048 BASIC METABOLIC PNL TOTAL CA: CPT

## 2024-04-17 ENCOUNTER — HOSPITAL ENCOUNTER (OUTPATIENT)
Age: 89
Setting detail: SPECIMEN
Discharge: HOME | End: 2024-04-17
Payer: MEDICARE

## 2024-04-17 LAB
BACTERIA: NEGATIVE /HPF
BILIRUBIN URINE: NEGATIVE MG/DL
BLOOD, URINE: ABNORMAL
CLARITY: CLEAR
COLOR: YELLOW
GLUCOSE, URINE: NEGATIVE MG/DL
KETONES, URINE: NEGATIVE MG/DL
LEUKOCYTE ESTERASE, URINE: ABNORMAL
NITRITE URINE, QUANTITATIVE: NEGATIVE
PH, URINE: 6 (ref 5–8)
PROTEIN UA: 100 MG/DL
RBC URINE: 0 /HPF (ref 0–3)
SPECIFIC GRAVITY UA: 1.01 (ref 1–1.03)
TRICHOMONAS: ABNORMAL /HPF
UROBILINOGEN, URINE: 0.2 MG/DL (ref 0.2–1)
WBC CLUMP: ABNORMAL /HPF
WBC UA: 3571 /HPF (ref 0–2)

## 2024-04-17 PROCEDURE — 84156 ASSAY OF PROTEIN URINE: CPT

## 2024-04-17 PROCEDURE — 81001 URINALYSIS AUTO W/SCOPE: CPT

## 2024-04-17 PROCEDURE — 82570 ASSAY OF URINE CREATININE: CPT

## 2024-04-18 ENCOUNTER — HOSPITAL ENCOUNTER (OUTPATIENT)
Age: 89
Setting detail: SPECIMEN
Discharge: HOME | End: 2024-04-18
Payer: MEDICARE

## 2024-04-18 LAB
ALBUMIN SERPL-MCNC: 2.8 GM/DL (ref 3.4–5)
ALP BLD-CCNC: 83 IU/L (ref 40–128)
ALT SERPL-CCNC: 9 U/L (ref 10–40)
ANION GAP SERPL CALCULATED.3IONS-SCNC: 15 MMOL/L (ref 7–16)
AST SERPL-CCNC: 10 IU/L (ref 15–37)
BILIRUB SERPL-MCNC: 0.3 MG/DL (ref 0–1)
BUN SERPL-MCNC: 110 MG/DL (ref 6–23)
CALCIUM SERPL-MCNC: 8.2 MG/DL (ref 8.3–10.6)
CHLORIDE BLD-SCNC: 105 MMOL/L (ref 99–110)
CO2: 19 MMOL/L (ref 21–32)
CREAT SERPL-MCNC: 4.7 MG/DL (ref 0.9–1.3)
CREATININE URINE: 38.7 MG/DL (ref 39–259)
GFR SERPL CREATININE-BSD FRML MDRD: 11 ML/MIN/1.73M2
GLUCOSE SERPL-MCNC: 85 MG/DL (ref 70–99)
POTASSIUM SERPL-SCNC: 5.6 MMOL/L (ref 3.5–5.1)
PROT/CREAT RATIO, UR: 2.9
SODIUM BLD-SCNC: 139 MMOL/L (ref 135–145)
TOTAL PROTEIN: 6.3 GM/DL (ref 6.4–8.2)
URINE TOTAL PROTEIN: 111.9 MG/DL

## 2024-04-18 PROCEDURE — 80053 COMPREHEN METABOLIC PANEL: CPT

## 2024-04-18 PROCEDURE — 36415 COLL VENOUS BLD VENIPUNCTURE: CPT

## 2024-05-05 PROCEDURE — 93294 REM INTERROG EVL PM/LDLS PM: CPT | Performed by: INTERNAL MEDICINE

## 2024-05-05 PROCEDURE — 93296 REM INTERROG EVL PM/IDS: CPT | Performed by: INTERNAL MEDICINE

## 2024-05-06 ENCOUNTER — TELEPHONE (OUTPATIENT)
Dept: WOUND CARE | Age: 89
End: 2024-05-06

## 2024-05-07 ENCOUNTER — PROCEDURE VISIT (OUTPATIENT)
Dept: CARDIOLOGY CLINIC | Age: 89
End: 2024-05-07
Payer: MEDICARE

## 2024-05-07 DIAGNOSIS — Z95.0 CARDIAC PACEMAKER IN SITU: Primary | ICD-10-CM

## 2024-05-07 DIAGNOSIS — I49.5 SINUS NODE DYSFUNCTION (HCC): ICD-10-CM

## 2024-05-07 DIAGNOSIS — I44.30 AV BLOCK: ICD-10-CM

## 2024-06-06 ENCOUNTER — TELEPHONE (OUTPATIENT)
Dept: CARDIOLOGY CLINIC | Age: 89
End: 2024-06-06

## 2024-06-06 NOTE — TELEPHONE ENCOUNTER
Left message for patient to return my call. [employer] monitor and PPM are not linked and he needs to send a transmission.

## 2024-07-30 ENCOUNTER — TELEPHONE (OUTPATIENT)
Dept: CARDIOLOGY CLINIC | Age: 89
End: 2024-07-30

## 2024-07-30 NOTE — TELEPHONE ENCOUNTER
Called for patients wife to send carelink transmission. Daughter said that she had gotten the message a couple of weeks ago when I called to remind patient to send carelink transmission. She said that she meant to call me back but informed me that patient has passed away.

## 2024-09-23 NOTE — PROGRESS NOTES
Please see Dr. Deanne Campos progress note for pacemaker impression. Some improvement, still has recurrent episodes.

## (undated) DEVICE — SOLUTION IV IRRIG WATER 1000ML POUR BRL 2F7114

## (undated) DEVICE — DRAPE,EXTREMITY,89X128,STERILE: Brand: MEDLINE

## (undated) DEVICE — SUTURE NONABSORBABLE MONOFILAMENT 4-0 FS-2 18 IN ETHILON 662H

## (undated) DEVICE — TUBE CULTURE LF UNIFORM BOTTOM STER

## (undated) DEVICE — SYRINGE IRRIG 60ML SFT PLIABLE BLB EZ TO GRP 1 HND USE W/

## (undated) DEVICE — ELECTRODE ES AD CRDLSS PT RET REM POLYHESIVE

## (undated) DEVICE — TUBING, SUCTION, 9/32" X 10', STRAIGHT: Brand: MEDLINE

## (undated) DEVICE — BANDAGE,ELASTIC,ESMARK,STERILE,4"X9',LF: Brand: MEDLINE

## (undated) DEVICE — Z INACTIVE USE 2863041 SPONGE GZ W4XL4IN 100% COT 16 PLY RADPQ HIGHLY ABSRB

## (undated) DEVICE — Z INACTIVE NO ACTIVE SUPPLIER APPLICATOR MEDICATED 26 CC TINT HI-LITE ORNG STRL CHLORAPREP

## (undated) DEVICE — GLOVE SURG SZ 8 L12IN THK75MIL DK GRN LTX FREE

## (undated) DEVICE — KIT DSG LRG NEG PRSS WND THER VAC GRANUFOAM

## (undated) DEVICE — YANKAUER,FLEXIBLE HANDLE,REGLR CAPACITY: Brand: MEDLINE INDUSTRIES, INC.

## (undated) DEVICE — COUNTER NDL 30 COUNT FOAM STRP SGL MAG

## (undated) DEVICE — BANDAGE,SELF ADHRNT,COFLEX,4"X5YD,STRL: Brand: COLABEL

## (undated) DEVICE — SPONGE GZ W4XL8IN COT WVN 12 PLY

## (undated) DEVICE — FAN SPRAY KIT: Brand: PULSAVAC®

## (undated) DEVICE — SOLUTION PREP PAINT POV IOD FOR SKIN MUCOUS MEM

## (undated) DEVICE — PENCIL ES CRD L10FT HND SWCHING ROCK SWCH W/ EDGE COAT BLDE

## (undated) DEVICE — SPONGE LAP W18XL18IN WHT COT 4 PLY FLD STRUNG RADPQ DISP ST 2 PER PACK

## (undated) DEVICE — LINER,SEMI-RIGID,3000CC,50EA/CS: Brand: MEDLINE

## (undated) DEVICE — BANDAGE,GAUZE,BULKEE II,4.5"X4.1YD,STRL: Brand: MEDLINE

## (undated) DEVICE — GOWN,SIRUS,POLYRNF,BRTHSLV,XLN/XL,20/CS: Brand: MEDLINE

## (undated) DEVICE — COVER,C-ARM,41X74: Brand: MEDLINE

## (undated) DEVICE — MARKER SURG SKIN UTIL REGULAR/FINE 2 TIP W/ RUL AND 9 LBL

## (undated) DEVICE — SUTURE VCRL SZ 0 L18IN ABSRB UD L36MM CT-1 1/2 CIR J840D

## (undated) DEVICE — PAD,ABDOMINAL,5"X9",ST,LF,25/BX: Brand: MEDLINE INDUSTRIES, INC.

## (undated) DEVICE — CANISTER NEG PRSS 500ML TBNG CLMP CONN W/O GEL DISP INFOVAC

## (undated) DEVICE — NEEDLE HYPO 25GA L1.5IN BLU POLYPR HUB S STL REG BVL STR

## (undated) DEVICE — Device

## (undated) DEVICE — GLOVE SURG SZ 6 THK91MIL LTX FREE SYN POLYISOPRENE ANTI

## (undated) DEVICE — PACK,BASIC,IX: Brand: MEDLINE

## (undated) DEVICE — DRESSING PETRO W3XL3IN OIL EMUL N ADH GZ KNIT IMPREG CELOS

## (undated) DEVICE — SYRINGE MED 10ML LUERLOCK TIP W/O SFTY DISP

## (undated) DEVICE — INTENDED FOR TISSUE SEPARATION, AND OTHER PROCEDURES THAT REQUIRE A SHARP SURGICAL BLADE TO PUNCTURE OR CUT.: Brand: BARD-PARKER ® STAINLESS STEEL BLADES

## (undated) DEVICE — SUTURE ETHLN SZ 3-0 L30IN NONABSORBABLE BLK FS-1 L24MM 3/8 669H

## (undated) DEVICE — TOWEL,OR,DSP,ST,BLUE,STD,6/PK,12PK/CS: Brand: MEDLINE

## (undated) DEVICE — GLOVE ORANGE PI 7 1/2   MSG9075

## (undated) DEVICE — TAPE SURG W4INXL11YD 2IN PERF LINERLESS NONWOVEN MEDFIX EZ

## (undated) DEVICE — DRAPE SHEET ULTRAGARD: Brand: MEDLINE

## (undated) DEVICE — PADDING,UNDERCAST,COTTON, 4"X4YD STERILE: Brand: MEDLINE

## (undated) DEVICE — GOWN,ECLIPSE,POLYRNF,BRTHSLV,L,30/CS: Brand: MEDLINE

## (undated) DEVICE — 6617 IOBAN II PATIENT ISOLATION DRAPE 5/BX,4BX/CS: Brand: STERI-DRAPE™ IOBAN™ 2

## (undated) DEVICE — SUTURE VCRL SZ 2-0 L18IN ABSRB UD CT-1 L36MM 1/2 CIR J839D

## (undated) DEVICE — TRAY PREP DRY W/ PREM GLV 2 APPL 6 SPNG 2 UNDPD 1 OVERWRAP

## (undated) DEVICE — MAT ABSRB W28XL48IN C6L FLR ULT W POLY BK

## (undated) DEVICE — BANDAGE COMPR W6INXL5YD SELF ADH COHESIVE CO FLX

## (undated) DEVICE — SOLUTION IV 1000ML 0.9% SOD CHL FOR IRRIG PLAS CONT